# Patient Record
Sex: FEMALE | Race: BLACK OR AFRICAN AMERICAN | NOT HISPANIC OR LATINO | Employment: OTHER | URBAN - METROPOLITAN AREA
[De-identification: names, ages, dates, MRNs, and addresses within clinical notes are randomized per-mention and may not be internally consistent; named-entity substitution may affect disease eponyms.]

---

## 2017-01-05 ENCOUNTER — LAB VISIT (OUTPATIENT)
Dept: LAB | Facility: HOSPITAL | Age: 62
End: 2017-01-05
Attending: INTERNAL MEDICINE
Payer: COMMERCIAL

## 2017-01-05 ENCOUNTER — TELEPHONE (OUTPATIENT)
Dept: RHEUMATOLOGY | Facility: CLINIC | Age: 62
End: 2017-01-05

## 2017-01-05 DIAGNOSIS — M35.9 UNDIFFERENTIATED CONNECTIVE TISSUE DISEASE: ICD-10-CM

## 2017-01-05 DIAGNOSIS — M35.00 SICCA SYNDROME: ICD-10-CM

## 2017-01-05 DIAGNOSIS — M35.9 UNDIFFERENTIATED CONNECTIVE TISSUE DISEASE: Primary | ICD-10-CM

## 2017-01-05 DIAGNOSIS — R76.8 POSITIVE ANTI-CCP TEST: ICD-10-CM

## 2017-01-05 DIAGNOSIS — K51.90 ULCERATIVE COLITIS WITHOUT COMPLICATIONS: ICD-10-CM

## 2017-01-05 DIAGNOSIS — D89.2 HYPERGAMMAGLOBULINEMIA: ICD-10-CM

## 2017-01-05 DIAGNOSIS — I10 ESSENTIAL HYPERTENSION: ICD-10-CM

## 2017-01-05 PROCEDURE — 36415 COLL VENOUS BLD VENIPUNCTURE: CPT | Mod: PO

## 2017-01-05 NOTE — TELEPHONE ENCOUNTER
Dr. Del Valle 2 things:    1. Please add Venipuncture order for patient's RDL lab. Do not order Misc Send out test it is incorrect and lab will send to [facility].    2. Also add regular 4 labs. Thank you, Sorin.     Patient arriving for lab on 01/09/2017 to have RDL labs FedEX'd.

## 2017-01-05 NOTE — TELEPHONE ENCOUNTER
----- Message from Aura Faustin sent at 1/5/2017  1:32 PM CST -----  Contact: pt  Pt returning nurse call, please call pt @ [phone].

## 2017-01-06 NOTE — TELEPHONE ENCOUNTER
After reviewing this with Dr. NAM he states to place a Misc Sendout Test but in the comments to place: ELEIL 12 Panel. To be sent out by Rheumatology call Sorin when ready. EXT [id number]

## 2017-01-09 ENCOUNTER — LAB VISIT (OUTPATIENT)
Dept: LAB | Facility: HOSPITAL | Age: 62
End: 2017-01-09
Attending: INTERNAL MEDICINE
Payer: COMMERCIAL

## 2017-01-09 DIAGNOSIS — M35.9 UNDIFFERENTIATED CONNECTIVE TISSUE DISEASE: Primary | ICD-10-CM

## 2017-01-09 DIAGNOSIS — M35.9 UNDIFFERENTIATED CONNECTIVE TISSUE DISEASE: ICD-10-CM

## 2017-01-09 LAB — MISCELLANEOUS TEST NAME: NORMAL

## 2017-01-19 ENCOUNTER — OFFICE VISIT (OUTPATIENT)
Dept: RHEUMATOLOGY | Facility: CLINIC | Age: 62
End: 2017-01-19
Payer: COMMERCIAL

## 2017-01-19 VITALS
BODY MASS INDEX: 26.2 KG/M2 | HEART RATE: 79 BPM | DIASTOLIC BLOOD PRESSURE: 76 MMHG | HEIGHT: 64 IN | SYSTOLIC BLOOD PRESSURE: 122 MMHG | WEIGHT: 153.44 LBS

## 2017-01-19 DIAGNOSIS — D89.2 HYPERGAMMAGLOBULINEMIA: ICD-10-CM

## 2017-01-19 DIAGNOSIS — M35.00 SICCA SYNDROME: ICD-10-CM

## 2017-01-19 DIAGNOSIS — M35.9 UNDIFFERENTIATED CONNECTIVE TISSUE DISEASE: Primary | ICD-10-CM

## 2017-01-19 DIAGNOSIS — K51.30 ULCERATIVE RECTOSIGMOIDITIS WITHOUT COMPLICATION: ICD-10-CM

## 2017-01-19 PROCEDURE — 3078F DIAST BP <80 MM HG: CPT | Mod: S$GLB,,, | Performed by: INTERNAL MEDICINE

## 2017-01-19 PROCEDURE — 99999 PR PBB SHADOW E&M-EST. PATIENT-LVL III: CPT | Mod: PBBFAC,,, | Performed by: INTERNAL MEDICINE

## 2017-01-19 PROCEDURE — 1159F MED LIST DOCD IN RCRD: CPT | Mod: S$GLB,,, | Performed by: INTERNAL MEDICINE

## 2017-01-19 PROCEDURE — 90670 PCV13 VACCINE IM: CPT | Mod: S$GLB,,, | Performed by: INTERNAL MEDICINE

## 2017-01-19 PROCEDURE — 3074F SYST BP LT 130 MM HG: CPT | Mod: S$GLB,,, | Performed by: INTERNAL MEDICINE

## 2017-01-19 PROCEDURE — 99214 OFFICE O/P EST MOD 30 MIN: CPT | Mod: 25,S$GLB,, | Performed by: INTERNAL MEDICINE

## 2017-01-19 PROCEDURE — 90471 IMMUNIZATION ADMIN: CPT | Mod: S$GLB,,, | Performed by: INTERNAL MEDICINE

## 2017-01-19 RX ORDER — HYDROXYCHLOROQUINE SULFATE 200 MG/1
200 TABLET, FILM COATED ORAL 2 TIMES DAILY
Qty: 180 TABLET | Refills: 3 | Status: SHIPPED | OUTPATIENT
Start: 2017-01-19 | End: 2018-01-24 | Stop reason: SDUPTHER

## 2017-01-19 NOTE — PROGRESS NOTES
Administered 0.5cc Prevnar Vaccination to Right Deltoid. Pt tolerated well. No acute reaction noted to site. Pt instructed on S/S to report. Pt verbalized understanding.     Lot:[id number]  Exp:03/31/2018  Manu: [employer]

## 2017-01-19 NOTE — PROGRESS NOTES
CHIEF COMPLAINT:  UCTD.    PERTINENT HISTORY:  Juanita is followed here with a history of positive ELIEL,   with a low titer DNA on occasion, and a positive rheumatoid factor and CCP with   hypergammaglobulinemia.  She is thought to possibly have Sjogren's since she has   some dryness, but her Sjogren's antibodies have been negative.  Since she has   had a positive low DNA, we decided to send her lab out to [facility] in   [place] to see what their complete analysis would show.    She is doing well.  She has got some mild dryness but had not progressed.  She   is not having fever, chills or sweats.  She is not losing weight.  She had good   appetite.  She is having no chest pain or any type of shortness of breath.    There have been no heart symptoms.  She has had no GI issues other than stable   ulcerative colitis.  She remains on treatment for that.  Rare diarrhea noted.    Muscles and joints have been excellent.  She has had no new skin rashes.    REVIEW OF SYSTEMS:  Negative as per HPI.  GENERAL:  No fevers, chills, fatigability, change in appetite, weight loss.  No   recent infections.  SKIN:  She does use Evoxac for dryness and over-the-counter dryness   preparations.  No rashes, itching or changes in color or texture of skin, no   Raynaud's, no malar rash.  HEAD:  No headache or recent head trauma.  EYES:  Visual acuity fine.  No ocular pain or redness.  EARS:  Denies ear pain, discharge or vertigo.  NOSE:  No loss of smell.  No epistaxis or postnasal drip.  MOUTH AND THROAT:  No hoarseness or change in voice or oral ulcers.  No   difficulty swallowing or dryness.  NODES:  Denies swollen glands.  CHEST:  Denies shortness of breath, APPIAH, cyanosis, wheezing, cough and sputum   production.  CARDIOVASCULAR:  Note, she has a history of essential hypertension, is on Diovan   HCTZ.  Her blood pressure excellent today.  She also takes Norvasc.  Does not   have Raynaud's.  Denies chest pain, PND, orthopnea or  reduced exercise   tolerance.  ABDOMEN:  Ulcerative colitis seems to be well controlled presently.  She remains   on Colazal 750 mg three times daily.  No weight loss.  Denies nausea, vomiting,   diarrhea, abdominal pain, hematemesis or blood in stool.  URINARY:  No flank pain, dysuria or hematuria.  PERIPHERAL VASCULAR:  No claudication or cyanosis.  NEUROLOGIC:  No numbness, weakness or tingling.    PHYSICAL EXAMINATION:  VITAL SIGNS:  Height 5 feet 4 inches, weight 69 kg, blood pressure 122/76, pulse   79, pain 0.  APPEARANCE:  Well nourished, well developed, in no acute distress.  SKIN:  Clear.  No rashes, no wounds, no ecchymosis.  Nail beds pink.  No digital   ulcers.  No nodules or tightness noted.  HEENT:  She does have a problem with her eyes, but they are not tender and not   really dry.  She has got no parotid or submandibular enlargement.  She has   minimal dryness of her mouth.  She has got no adenopathy.  Her neck,   supraclavicular area, thyroid is nontender.  Normocephalic, atraumatic, alert   and oriented X3.  PERRLA.  Conjunctivae clear, sclerae nonicteric.  No tonsillar   enlargement.  No pharyngeal erythema or exudate.  No ulcers or lesions noted.    Mucous membranes moist and pink.  Oral pharynx clear.  Neck supple, no JVD.    Normal ROM.  Thyroid normal.  No masses or tracheal deviation.  No cervical,   axillary or inguinal lymph node enlargement.  CHEST:  Lungs clear to auscultation bilaterally.  No dullness to percussion.  No   accessory muscle use.  No intercostal retraction noted.  CARDIOVASCULAR:  Normal S1, S2.  No rubs, murmurs or gallops.  No peripheral   edema.  ABDOMEN:  Bowel sounds normal, abdomen soft, not distended.  No tenderness or   masses.  No hepatosplenomegaly.  EXTREMITIES:  No clubbing, cyanosis or edema noted.  Dorsalis and pedis pulses   2+ palpable.  NEUROLOGICAL:  Cranial nerves II-XII intact.  Motor 5/5 strength major   flexors/extensors.  No tremor.  GAIT AND  POSTURE:  Normal gait.  No cerebellar signs.  MUSCULOSKELETAL:  Muscle strength normal.  Joint:  Normal.  No swelling, redness   or heat in small joints or large joints.    LABORATORY DATA:  Labs sent out to [facility] shows her ELIEL low titer positive 1:160   smooth, with DNA antibody by FAR technique negative, [place] and the SSA and SSB   antibodies with RNP antibodies are also normal.  Scleroderma antibodies and   chromatin antibodies normal.  CCP remains high at 200.  Rheumatoid factor   remains high at 590.  Thyroid antibodies negative.  Complement levels normal.    Her last CBC was stable.  White count below normal range around 4000, stable   hematocrit.  Sed rate is always slightly high probably due to   hypergammaglobulinemia in the 30s.  Platelets are normal.  Urinalysis from last   week with no proteinuria.  No active sediment.  Note, she had a UTI back in   November treated with antibiotics.  Her last creatinine was borderline of 1.2,   clearance around 57.    IMPRESSION:  1.  UCTD with check of DNA antibodies negative through [facility] and   remaining with nonspecific low-titer ELIEL with positive CCP and rheumatoid   factor.  Note, we are watching for development of  rheumatoid   arthritis but she has no synovitis at all on her exam today.  2.  Ulcerative colitis - stable.  3.  Hypergammaglobulinemia - polyclonal by history, have not checked the IP   recently.    PLAN:  1.  Reassurance with these antibodies.  2.  Obtain followup closely.  She is to call if she sees any persistent swelling   with tenderness and morning stiffness in the small joints of her hands or feet.  3.  Maintain Plaquenil 200 b.i.d. for now.  Her eye checks, which have been   regularly done, are normal.  4.  She is going to get a Prevnar vaccination today and get Pneumovax next year.    She is up to date on flu vaccine.  See back in approximately four to six   months.          /frannie [id number] review          YOSELIN/GURVINDER  dd: 01/19/2017 14:16:45  (CST)  td: 01/19/2017 20:33:52 (CST)  Doc ID   #[id number]  Job ID #[id number]    CC:

## 2017-01-19 NOTE — MR AVS SNAPSHOT
[facility] - Rheumatology  [address]  [place] [address]  Phone: [phone]  Fax: [phone]                  Juanita Walton   2017 1:30 PM   Office Visit    Description:  Female : 1955   Provider:  Silvio Del Valle MD   Department:  [facility] - Rhe[facility]ology           Reason for Visit     UCTD           Diagnoses this Visit        Comments    Undifferentiated connective tissue disease    -  Primary     Sicca syndrome         Hypergammaglobulinemia         Ulcerative rectosigmoiditis without complication                To Do List           Future Appointments        Provider Department Dept Phone    3/6/2017 8:00 AM FIELDS, [facility] [phone]    3/6/2017 8:45 AM Johnathan Mcdaniel OD [facility] [phone]    2017 12:00 PM SPECIMEN, [facility] [phone]    2017 12:10 PM LABORATORY, [facility] [phone]    2017 3:00 PM Silvio Del Valle MD [facility] [phone]      Goals (5 Years of Data)     None      Follow-Up and Disposition     Return in about 6 months (around 2017) for me, lab as ordered 1 week pv.       These Medications        Disp Refills Start End    hydroxychloroquine (PLAQUENIL) 200 mg tablet 180 tablet 3 2017     Take 1 tablet (200 mg total) by mouth 2 (two) times daily. - Oral    Pharmacy: [id number] - [address] [address] [place] Ph #: [phone]         [facility]     [facility] On [facility] Nurse Care Line -  Assistance  Registered nurses in the [facility] Center provide clinical advisement, health education, appointment booking, and other advisory services.  Call for this free service at [phone].             Medications           Message regarding Medications     Verify the changes and/or additions to your medication regime listed below are the same as discussed with your clinician  "today.  If any of these changes or additions are incorrect, please notify your healthcare provider.        CHANGE how you are taking these medications     Start Taking Instead of    hydroxychloroquine (PLAQUENIL) 200 mg tablet hydroxychloroquine (PLAQUENIL) 200 mg tablet    Dosage:  Take 1 tablet (200 mg total) by mouth 2 (two) times daily. Dosage:  take 1 tablet by mouth twice a day    Reason for Change:  Reorder            Verify that the below list of medications is an accurate representation of the medications you are currently taking.  If none reported, the list may be blank. If incorrect, please contact your healthcare provider. Carry this list with you in case of emergency.           Current Medications     amlodipine (NORVASC) 5 MG tablet Take 5 mg by mouth once daily.    atorvastatin (LIPITOR) 10 MG tablet Take 1 tablet by mouth Daily.    balsalazide (COLAZAL) 750 mg capsule Take 750 capsules by mouth 3 (three) times daily.    cevimeline (EVOXAC) 30 mg capsule take 1 capsule by mouth three times a day    hydroxychloroquine (PLAQUENIL) 200 mg tablet Take 1 tablet (200 mg total) by mouth 2 (two) times daily.    MULTIVITAMIN ORAL Daily.    valsartan-hydrochlorothiazide (DIOVAN-HCT) 320-12.5 mg per tablet Take 320 tablets by mouth once daily.    mometasone (NASONEX) 50 mcg/actuation nasal spray 2 sprays by Nasal route daily as needed.           Clinical Reference Information           Vital Signs - Last Recorded  Most recent update: 1/19/2017  1:46 PM by Kathy Muniz LPN    BP Pulse Ht Wt BMI    122/76 79 5' 4" (1.626 m) 69.6 kg (153 lb 7 oz) 26.34 kg/m2      Blood Pressure          Most Recent Value    BP  122/76      Allergies as of 1/19/2017     No Known Allergies      Immunizations Administered on Date of Encounter - 1/19/2017     Name Date Dose VIS Date Route    Pneumococcal Conjugate - 13 Valent 1/19/2017 0.5 mL 11/5/2015 Intramuscular      Orders Placed During Today's Visit      Normal Orders This " Visit    Pneumococcal Conjugate Vaccine (13 Valent) (IM)     Future Labs/Procedures Expected by Expires    Immunofixation electrophoresis  1/19/2017 3/20/2018    Recurring Lab Work Interval Expires    C-reactive protein   1/19/2018    CBC auto differential   1/19/2018    Comprehensive metabolic panel   1/19/2018    Sedimentation rate, manual   1/19/2018    Urinalysis   3/20/2018      Instructions    No change in meds    Vit d3- 800 units day

## 2017-03-06 ENCOUNTER — OFFICE VISIT (OUTPATIENT)
Dept: OPHTHALMOLOGY | Facility: CLINIC | Age: 62
End: 2017-03-06
Payer: COMMERCIAL

## 2017-03-06 DIAGNOSIS — H43.813 POSTERIOR VITREOUS DETACHMENT, BILATERAL: ICD-10-CM

## 2017-03-06 DIAGNOSIS — H43.811 POSTERIOR VITREOUS DETACHMENT, RIGHT: ICD-10-CM

## 2017-03-06 DIAGNOSIS — Z79.899 LONG-TERM USE OF PLAQUENIL: ICD-10-CM

## 2017-03-06 DIAGNOSIS — M35.9 UNDIFFERENTIATED CONNECTIVE TISSUE DISEASE: Primary | ICD-10-CM

## 2017-03-06 DIAGNOSIS — H52.4 BILATERAL PRESBYOPIA: ICD-10-CM

## 2017-03-06 DIAGNOSIS — H52.13 MYOPIA, BILATERAL: ICD-10-CM

## 2017-03-06 PROCEDURE — 92134 CPTRZ OPH DX IMG PST SGM RTA: CPT | Mod: S$GLB,,, | Performed by: OPTOMETRIST

## 2017-03-06 PROCEDURE — 92015 DETERMINE REFRACTIVE STATE: CPT | Mod: S$GLB,,, | Performed by: OPTOMETRIST

## 2017-03-06 PROCEDURE — 92014 COMPRE OPH EXAM EST PT 1/>: CPT | Mod: S$GLB,,, | Performed by: OPTOMETRIST

## 2017-03-06 PROCEDURE — 99999 PR PBB SHADOW E&M-EST. PATIENT-LVL II: CPT | Mod: PBBFAC,,, | Performed by: OPTOMETRIST

## 2017-03-06 PROCEDURE — 92083 EXTENDED VISUAL FIELD XM: CPT | Mod: S$GLB,,, | Performed by: OPTOMETRIST

## 2017-03-06 NOTE — PROGRESS NOTES
HPI     Eye Exam    Additional comments: yearly plaq ck, hvf, and moct           Comments   Last seen 9/12/16 with jcc for pvd. Last exam 2/29/16 with trf. No visual   complaints. Uses at's prn ou.     1. Plaquenil   2. Pvd ou       Last edited by Jackie Del Valle MA on 3/6/2017  9:09 AM. (History)            Assessment /Plan     For exam results, see Encounter Report.    Undifferentiated connective tissue disease  -     Pang Visual Field - OU - Extended - Both Eyes  -     Posterior Segment OCT Retina-Both eyes    Long-term use of Plaquenil  -     Pang Visual Field - OU - Extended - Both Eyes  -     Posterior Segment OCT Retina-Both eyes    Posterior vitreous detachment, bilateral    Posterior vitreous detachment, right    Myopia, bilateral    Bilateral presbyopia      No ocular evidence of Plaquenil toxicity.    Stable PVD OU.    Dispense Final Rx for glasses.  RTC 1 year

## 2017-06-26 RX ORDER — CEVIMELINE HYDROCHLORIDE 30 MG/1
CAPSULE ORAL
Qty: 90 CAPSULE | Refills: 4 | Status: SHIPPED | OUTPATIENT
Start: 2017-06-26 | End: 2017-07-20 | Stop reason: SDUPTHER

## 2017-07-13 ENCOUNTER — LAB VISIT (OUTPATIENT)
Dept: LAB | Facility: HOSPITAL | Age: 62
End: 2017-07-13
Attending: INTERNAL MEDICINE
Payer: COMMERCIAL

## 2017-07-13 DIAGNOSIS — M35.9 UNDIFFERENTIATED CONNECTIVE TISSUE DISEASE: ICD-10-CM

## 2017-07-13 DIAGNOSIS — M35.00 SICCA SYNDROME: ICD-10-CM

## 2017-07-13 LAB
ALBUMIN SERPL BCP-MCNC: 3.9 G/DL
ALP SERPL-CCNC: 61 U/L
ALT SERPL W/O P-5'-P-CCNC: 18 U/L
ANION GAP SERPL CALC-SCNC: 5 MMOL/L
AST SERPL-CCNC: 27 U/L
BASOPHILS # BLD AUTO: 0.02 K/UL
BASOPHILS NFR BLD: 0.5 %
BILIRUB SERPL-MCNC: 0.4 MG/DL
BUN SERPL-MCNC: 11 MG/DL
CALCIUM SERPL-MCNC: 9.7 MG/DL
CHLORIDE SERPL-SCNC: 105 MMOL/L
CO2 SERPL-SCNC: 32 MMOL/L
CREAT SERPL-MCNC: 1 MG/DL
CRP SERPL-MCNC: 1.3 MG/L
DIFFERENTIAL METHOD: ABNORMAL
EOSINOPHIL # BLD AUTO: 0.1 K/UL
EOSINOPHIL NFR BLD: 2.2 %
ERYTHROCYTE [DISTWIDTH] IN BLOOD BY AUTOMATED COUNT: 12.3 %
ERYTHROCYTE [SEDIMENTATION RATE] IN BLOOD BY WESTERGREN METHOD: 25 MM/HR
EST. GFR  (AFRICAN AMERICAN): >60 ML/MIN/1.73 M^2
EST. GFR  (NON AFRICAN AMERICAN): >60 ML/MIN/1.73 M^2
GLUCOSE SERPL-MCNC: 90 MG/DL
HCT VFR BLD AUTO: 39.3 %
HGB BLD-MCNC: 13.6 G/DL
LYMPHOCYTES # BLD AUTO: 1.4 K/UL
LYMPHOCYTES NFR BLD: 38 %
MCH RBC QN AUTO: 31.3 PG
MCHC RBC AUTO-ENTMCNC: 34.6 %
MCV RBC AUTO: 91 FL
MONOCYTES # BLD AUTO: 0.3 K/UL
MONOCYTES NFR BLD: 7.3 %
NEUTROPHILS # BLD AUTO: 1.9 K/UL
NEUTROPHILS NFR BLD: 52 %
PLATELET # BLD AUTO: 207 K/UL
PMV BLD AUTO: 9.6 FL
POTASSIUM SERPL-SCNC: 4.2 MMOL/L
PROT SERPL-MCNC: 8 G/DL
RBC # BLD AUTO: 4.34 M/UL
SODIUM SERPL-SCNC: 142 MMOL/L
WBC # BLD AUTO: 3.68 K/UL

## 2017-07-13 PROCEDURE — 80053 COMPREHEN METABOLIC PANEL: CPT | Mod: PO

## 2017-07-13 PROCEDURE — 86334 IMMUNOFIX E-PHORESIS SERUM: CPT | Mod: 26,,, | Performed by: PATHOLOGY

## 2017-07-13 PROCEDURE — 85025 COMPLETE CBC W/AUTO DIFF WBC: CPT | Mod: PO

## 2017-07-13 PROCEDURE — 86334 IMMUNOFIX E-PHORESIS SERUM: CPT

## 2017-07-13 PROCEDURE — 85651 RBC SED RATE NONAUTOMATED: CPT | Mod: PO

## 2017-07-13 PROCEDURE — 86140 C-REACTIVE PROTEIN: CPT

## 2017-07-13 PROCEDURE — 36415 COLL VENOUS BLD VENIPUNCTURE: CPT | Mod: PO

## 2017-07-14 LAB
INTERPRETATION SERPL IFE-IMP: NORMAL
PATHOLOGIST INTERPRETATION IFE: NORMAL

## 2017-07-20 ENCOUNTER — OFFICE VISIT (OUTPATIENT)
Dept: RHEUMATOLOGY | Facility: CLINIC | Age: 62
End: 2017-07-20
Payer: COMMERCIAL

## 2017-07-20 VITALS
BODY MASS INDEX: 27.25 KG/M2 | WEIGHT: 159.63 LBS | HEART RATE: 64 BPM | HEIGHT: 64 IN | SYSTOLIC BLOOD PRESSURE: 131 MMHG | DIASTOLIC BLOOD PRESSURE: 79 MMHG

## 2017-07-20 DIAGNOSIS — M35.9 UNDIFFERENTIATED CONNECTIVE TISSUE DISEASE: Primary | ICD-10-CM

## 2017-07-20 DIAGNOSIS — D89.2 HYPERGAMMAGLOBULINEMIA: ICD-10-CM

## 2017-07-20 DIAGNOSIS — M35.00 SICCA SYNDROME: ICD-10-CM

## 2017-07-20 DIAGNOSIS — K51.30 ULCERATIVE RECTOSIGMOIDITIS WITHOUT COMPLICATION: ICD-10-CM

## 2017-07-20 DIAGNOSIS — R76.8 POSITIVE ANTI-CCP TEST: ICD-10-CM

## 2017-07-20 PROCEDURE — 99999 PR PBB SHADOW E&M-EST. PATIENT-LVL III: CPT | Mod: PBBFAC,,, | Performed by: INTERNAL MEDICINE

## 2017-07-20 PROCEDURE — 99214 OFFICE O/P EST MOD 30 MIN: CPT | Mod: S$GLB,,, | Performed by: INTERNAL MEDICINE

## 2017-07-20 RX ORDER — CEVIMELINE HYDROCHLORIDE 30 MG/1
30 CAPSULE ORAL 3 TIMES DAILY
Qty: 90 CAPSULE | Refills: 7 | Status: SHIPPED | OUTPATIENT
Start: 2017-07-20 | End: 2017-10-10

## 2017-07-20 NOTE — PROGRESS NOTES
CHIEF COMPLAINT:  Shoulder and hand pain -- 2/10.    PERTINENT HISTORY:  Juanita was last seen in January.  She has a history of   undifferentiated connective tissue disease.  She has persistently positive   low-titer ELIEL and occasional DNA.  She has positive rheumatoid factor and CCP.    She has hypergammaglobulinemia and dryness.  She has never developed any   persistent synovitis.  Family history is positive for rheumatoid arthritis.  She   continues to do well and she has some mild aches on a few occasions in her   fingers and shoulders, but they stay for just a few days and go and never   associated with any persistent swelling.  She is staying active.    REVIEW OF SYSTEMS:  GENERAL:  She has not lost any weight.  She maybe gained a pound or two because   of her diet.  She is not having fever, chills, or sweats.  No fatigability,   change in appetite, weight loss.  No recent infections.  SKIN:  With no major issues.  She just have some dryness.  She is using some   good creams.  No rashes, itching or changes in color or texture of skin, no   Raynaud's, no malar rash.  HEAD:  No headache or recent head trauma.  EYES:  Eyes are not too dry yet.  Visual acuity fine.  No ocular pain or   redness.  EARS:  Denies ear pain, discharge or vertigo.  NOSE:  No loss of smell, no epistaxis or post nasal drip.  MOUTH AND THROAT:  Positive for dryness.  No hoarseness or change in voice or   oral ulcers.  No difficulty swallowing or dryness.  NODES:  Denies swollen glands.  CHEST:  Denies shortness of breath, APPIAH, cyanosis, wheezing, cough and sputum   production.  CARDIOVASCULAR:  With hypertension.  She is on Norvasc and Diovan HCTZ.  Denies   chest pain, PND, orthopnea or reduced exercise tolerance.  ABDOMEN:  GI, she does have ulcerative colitis.  She is on Colazal 750 three   times daily and that is actually doing well.  No weight loss.  Denies nausea,   vomiting, diarrhea, abdominal pain, hematemesis or blood in  stool.  URINARY:  No flank pain, dysuria or hematuria.  PERIPHERAL VASCULAR:  No claudication or cyanosis.  NEUROLOGIC:  No numbness, weakness or tingling.  BLOOD:  She is hypergammaglobulinemia and this had involved anemia which she has   been stable.  Note she does use Evoxac for her dryness.    PHYSICAL EXAMINATION:  VITAL SIGNS:  With her weight 72 kg, height 5 feet 4 inches, blood pressure   131/79 with a pulse in the 60s.  Pain score 2.  APPEARANCE:  Well nourished, well developed, in no acute distress.  SKIN:  Looks normal.  No signs of scleroderma.  Skin is mildly dry.  No rashes,   no wounds, no ecchymosis.  Nail beds pink.  No digital ulcers.  No nodules or   tightness noted.  HEENT:  She does not have any adenopathy.  Normocephalic, atraumatic, alert and   oriented x3.  PERRLA.  Conjunctivae clear, sclerae nonicteric.  No tonsillar   enlargement.  No pharyngeal erythema or exudate.  No ulcers or lesions noted.    Mucous membranes moist and pink.  Oral pharynx clear.  Neck supple, no JVD.    Normal ROM.  Thyroid normal.  No masses or tracheal deviation.  No cervical,   axillary or inguinal lymph node enlargement.  CHEST:  Lungs clear to auscultation bilaterally.  No dullness to percussion.  No   accessory muscle use.  No intercostal retraction noted.  CARDIOVASCULAR:  Normal S1, S2.  No rubs, murmurs or gallops.  No peripheral   edema.  ABDOMEN:  Bowel sounds normal, abdomen soft, not distended.  No tenderness or   masses.  No hepatosplenomegaly.  EXTREMITIES:  No clubbing, cyanosis or edema noted.  Dorsalis and pedis pulses   2+ palpable.  NEUROLOGICAL:  Cranial nerves II-XII intact.  Motor 5/5 strength major   flexors/extensors.  No tremor.  GAIT AND POSTURE:  Normal gait.  No cerebellar signs.  MUSCULOSKELETAL:  Hand exam, her PIPs, MP joints and wrists have good range of   motion.  There is no redness.  There is no heat.  There is no loss of mobility.    She has normal pulses.  Her elbows, shoulders,  hips, and knees all move well.    No fluid in these areas.  Her MTPs are normal.    LABORATORY DATA:  Shows white count is a little low at 3700 and that is about   normal for her with a good differential.  Hemoglobin is very good at 13.6 and   platelets normal.  Sed rate is better at 25 and holding steady.  Chemistries   look excellent including her renal function and liver studies.  Last CRP was   1.3.  Recent lipids were good.  Antibodies as mentioned in the past high titer   rheumatoid factor and CCP with low-titer ELIEL with negative specific antibodies.    IMPRESSION:  1.  UCTD -- stable on Plaquenil 200 mg b.i.d.  Watching for Sjogren's develop or   evolution to RA that has not happened at this point.  2.  Dryness -- moderate, just watching for Sjogren's.  3.  Ulcerative colitis, doing well.  4.  Cardiovascular disease -- blood pressure is good today.    PLAN:  Maintain her Evoxac and Plaquenil.  Encouraged her to exercise and see if   she can lose a few pounds.  Call if she gets any persistent swelling,   otherwise, we will see her at six month intervals.  We encouraged her to use   Systane for her eyes if she gets any increased eye dryness.  Encouraged her to   use vitamin D3 800 units a day to protect her bones.  Make sure the eye doctor   sends a copy of his visit.          /frannie [id number] review        YOSELIN/GURVINDER  dd: 07/20/2017 16:45:37 (CDT)  td: 07/20/2017 21:37:13 (CDT)  Doc ID   #[id number]  Job ID #[id number]        roney

## 2017-07-20 NOTE — PATIENT INSTRUCTIONS
Continue plaquenil twice day    Eye checked yearly    Vit d3 - 800 units day    Exercise    vicks vapor rub to nail    Call if you have any questions [phone] ask for nurse Sorin    Call the On Call Nurse line at the end of this AVS if you cannot reach us.

## 2017-10-10 ENCOUNTER — PATIENT MESSAGE (OUTPATIENT)
Dept: RHEUMATOLOGY | Facility: CLINIC | Age: 62
End: 2017-10-10

## 2017-10-10 DIAGNOSIS — M35.00 SICCA SYNDROME: Primary | ICD-10-CM

## 2017-10-10 RX ORDER — PILOCARPINE HYDROCHLORIDE 5 MG/1
5 TABLET, FILM COATED ORAL 2 TIMES DAILY
Qty: 60 TABLET | Refills: 11 | Status: SHIPPED | OUTPATIENT
Start: 2017-10-10 | End: 2018-02-26 | Stop reason: SDUPTHER

## 2017-12-14 ENCOUNTER — OFFICE VISIT (OUTPATIENT)
Dept: FAMILY MEDICINE | Facility: CLINIC | Age: 62
End: 2017-12-14
Payer: COMMERCIAL

## 2017-12-14 VITALS
HEART RATE: 98 BPM | DIASTOLIC BLOOD PRESSURE: 64 MMHG | TEMPERATURE: 98 F | HEIGHT: 64 IN | RESPIRATION RATE: 18 BRPM | WEIGHT: 158.75 LBS | SYSTOLIC BLOOD PRESSURE: 118 MMHG | BODY MASS INDEX: 27.1 KG/M2

## 2017-12-14 DIAGNOSIS — I10 ESSENTIAL HYPERTENSION: ICD-10-CM

## 2017-12-14 DIAGNOSIS — M35.9 UNDIFFERENTIATED CONNECTIVE TISSUE DISEASE: ICD-10-CM

## 2017-12-14 DIAGNOSIS — Z00.00 PREVENTATIVE HEALTH CARE: Primary | ICD-10-CM

## 2017-12-14 DIAGNOSIS — Z23 NEED FOR TDAP VACCINATION: ICD-10-CM

## 2017-12-14 DIAGNOSIS — K51.30 ULCERATIVE RECTOSIGMOIDITIS WITHOUT COMPLICATION: ICD-10-CM

## 2017-12-14 DIAGNOSIS — E78.5 HYPERLIPIDEMIA, UNSPECIFIED HYPERLIPIDEMIA TYPE: ICD-10-CM

## 2017-12-14 PROCEDURE — 90471 IMMUNIZATION ADMIN: CPT | Mod: S$GLB,,, | Performed by: FAMILY MEDICINE

## 2017-12-14 PROCEDURE — 99999 PR PBB SHADOW E&M-EST. PATIENT-LVL III: CPT | Mod: PBBFAC,,, | Performed by: FAMILY MEDICINE

## 2017-12-14 PROCEDURE — 90715 TDAP VACCINE 7 YRS/> IM: CPT | Mod: S$GLB,,, | Performed by: FAMILY MEDICINE

## 2017-12-14 PROCEDURE — 99396 PREV VISIT EST AGE 40-64: CPT | Mod: 25,S$GLB,, | Performed by: FAMILY MEDICINE

## 2017-12-15 ENCOUNTER — LAB VISIT (OUTPATIENT)
Dept: LAB | Facility: HOSPITAL | Age: 62
End: 2017-12-15
Attending: FAMILY MEDICINE
Payer: COMMERCIAL

## 2017-12-15 DIAGNOSIS — Z00.00 PREVENTATIVE HEALTH CARE: ICD-10-CM

## 2017-12-15 LAB
CHOLEST SERPL-MCNC: 170 MG/DL
CHOLEST/HDLC SERPL: 3.5 {RATIO}
HDLC SERPL-MCNC: 49 MG/DL
HDLC SERPL: 28.8 %
LDLC SERPL CALC-MCNC: 105.4 MG/DL
NONHDLC SERPL-MCNC: 121 MG/DL
TRIGL SERPL-MCNC: 78 MG/DL
TSH SERPL DL<=0.005 MIU/L-ACNC: 1.31 UIU/ML

## 2017-12-15 PROCEDURE — 84443 ASSAY THYROID STIM HORMONE: CPT

## 2017-12-15 PROCEDURE — 36415 COLL VENOUS BLD VENIPUNCTURE: CPT | Mod: PO

## 2017-12-15 PROCEDURE — 80061 LIPID PANEL: CPT

## 2017-12-15 NOTE — PROGRESS NOTES
CHIEF COMPLAINT: This is a 62-year-old female here for preventive health exam.    SUBJECTIVE: Patient is doing well without complaints.  Patient has hypertension for which she takes amlodipine and Diovan HCT.  Blood pressure is controlled with today's reading 118/64.  Patient is followed by a cardiologist for hypertension and hyperlipidemia.  Patient has sicca syndrome and undifferentiated connective tissue disease for which she takes hydroxychloroquine.  She also has ulcerative colitis and is followed by GI.  Patient takes Colazal.     Eye exam March 2017.  Pap smear May 2017 per GYN.  Mammogram May 2017 per GYN.  Colonoscopy March 2016.  Immunizations: Influenza vaccine October 2017.  Prevnar January 2017.  Tetanus needs update.     ROS:  GENERAL: Patient denies fever, chills, night sweats.  Patient denies weight gain or loss. Patient denies anorexia, fatigue, weakness or swollen glands.  SKIN: Patient denies rash or hair loss.  HEENT: Patient denies sore throat, ear pain, hearing loss, nasal congestion, or runny nose. Patient denies visual disturbance, eye irritation or discharge.  LUNGS: Patient denies cough, wheeze or hemoptysis.  CARDIOVASCULAR: Patient denies chest pain, shortness of breath, palpitations, syncope or lower extremity edema.  GI: Patient denies abdominal pain, nausea, vomiting, diarrhea, constipation, blood in stool or melena.  GENITOURINARY: Patient denies pelvic pain, vaginal discharge, itch or odor. Patient denies irregular vaginal bleeding.  Patient denies dysuria, frequency, hematuria, nocturia, urgency or incontinence.  BREASTS: Patient denies breast pain, mass or nipple discharge.  MUSCULOSKELETAL: Patient denies joint pain, swelling, redness or warmth.  NEUROLOGIC: Patient denies headache, vertigo, paresthesias, weakness in limb, dysarthria, dysphagia or abnormality of gait.  PSYCHIATRIC: Patient denies anxiety, depression, or memory loss.     OBJECTIVE:   GENERAL: Well-developed  well-nourished slightly overweight black female alert and oriented x3, in no acute distress.  Memory, judgment and cognition without deficit.  Weight gain of 7 pounds in the last year.  SKIN: Clear without rash.  Normal color and tone.  HEENT: Eyes: Clear conjunctivae. No scleral icterus.  Pupils equal reactive to light and accommodation.  Ears: Clear canals. Clear TMs.  Nose: Without congestion.  Pharynx: Without injection or exudates.  NECK: Supple, normal range of motion.  No masses, lymphadenopathy or enlarged thyroid.  No JVD.  Carotids 2+ and equal.  No bruits.  LUNGS: Clear to auscultation.  Normal respiratory effort.  CARDIOVASCULAR:  Regular rhythm, normal S1, S2 without murmur, gallop or rub.  BACK:  No CVA or spinal tenderness.  BREASTS:  No masses, tenderness or nipple discharge.  ABDOMEN: Normal appearance.  Active bowel sounds.  Soft, nontender without mass or organomegaly.  No rebound or guarding.  EXTREMITIES: Without cyanosis, clubbing or edema.  Distal pulses 2+ and equal.  Normal range of motion in all extremities.  No joint effusion, erythema or warmth.  NEUROLOGIC:   Cranial nerves II through XII without deficit.  Motor strength equal bilaterally.  Sensation normal to touch.  Deep tendon reflexes 2+ and equal.  Gait without abnormality.  No tremor.  Negative cerebellar signs.  PELVIC: Deferred to GYN.      ASSESSMENT:  1. Preventative health care    2. Hyperlipidemia, unspecified hyperlipidemia type    3. Essential hypertension    4. Ulcerative rectosigmoiditis without complication    5. Undifferentiated connective tissue disease    6. Need for Tdap vaccination      PLAN:   1.  Weight reduction.  Exercise regularly.  2.  Age-appropriate counseling.  3.  Recent lab reviewed and acceptable.  4.  Return to clinic for fasting lipid panel and TSH.  5.  Tdap vaccine.  6.  Refill medications as needed.

## 2018-01-24 DIAGNOSIS — M35.00 SICCA SYNDROME: ICD-10-CM

## 2018-01-24 DIAGNOSIS — M35.9 UNDIFFERENTIATED CONNECTIVE TISSUE DISEASE: ICD-10-CM

## 2018-01-24 RX ORDER — HYDROXYCHLOROQUINE SULFATE 200 MG/1
200 TABLET, FILM COATED ORAL 2 TIMES DAILY
Qty: 180 TABLET | Refills: 3 | Status: SHIPPED | OUTPATIENT
Start: 2018-01-24 | End: 2018-02-26 | Stop reason: SDUPTHER

## 2018-02-14 ENCOUNTER — TELEPHONE (OUTPATIENT)
Dept: RHEUMATOLOGY | Facility: CLINIC | Age: 63
End: 2018-02-14

## 2018-02-14 ENCOUNTER — PATIENT MESSAGE (OUTPATIENT)
Dept: RHEUMATOLOGY | Facility: CLINIC | Age: 63
End: 2018-02-14

## 2018-02-14 DIAGNOSIS — M35.00 SICCA SYNDROME: ICD-10-CM

## 2018-02-14 DIAGNOSIS — D89.2 HYPERGAMMAGLOBULINEMIA: ICD-10-CM

## 2018-02-14 DIAGNOSIS — M35.9 UNDIFFERENTIATED CONNECTIVE TISSUE DISEASE: Primary | ICD-10-CM

## 2018-02-14 DIAGNOSIS — R76.8 POSITIVE ANTI-CCP TEST: ICD-10-CM

## 2018-02-20 ENCOUNTER — PATIENT MESSAGE (OUTPATIENT)
Dept: RHEUMATOLOGY | Facility: CLINIC | Age: 63
End: 2018-02-20

## 2018-02-22 LAB
ALBUMIN SERPL-MCNC: 4.3 G/DL (ref 3.6–4.8)
ALBUMIN/GLOB SERPL: 1.3 {RATIO} (ref 1.2–2.2)
ALP SERPL-CCNC: 66 IU/L (ref 39–117)
ALT SERPL-CCNC: 22 IU/L (ref 0–32)
ANA SER QL: NEGATIVE
APPEARANCE UR: CLEAR
AST SERPL-CCNC: 35 IU/L (ref 0–40)
BACTERIA #/AREA URNS HPF: NORMAL /[HPF]
BASOPHILS # BLD AUTO: 0 X10E3/UL (ref 0–0.2)
BASOPHILS NFR BLD AUTO: 0 %
BILIRUB SERPL-MCNC: 0.2 MG/DL (ref 0–1.2)
BILIRUB UR QL STRIP: NEGATIVE
BUN SERPL-MCNC: 10 MG/DL (ref 8–27)
BUN/CREAT SERPL: 10 (ref 12–28)
C3 SERPL-MCNC: 144 MG/DL (ref 82–167)
C4 SERPL-MCNC: 36 MG/DL (ref 14–44)
CALCIUM SERPL-MCNC: 9.7 MG/DL (ref 8.7–10.3)
CHLORIDE SERPL-SCNC: 103 MMOL/L (ref 96–106)
CO2 SERPL-SCNC: 28 MMOL/L (ref 18–29)
COLOR UR: YELLOW
CREAT SERPL-MCNC: 0.97 MG/DL (ref 0.57–1)
CRP SERPL-MCNC: 1.6 MG/L (ref 0–4.9)
EOSINOPHIL # BLD AUTO: 0.1 X10E3/UL (ref 0–0.4)
EOSINOPHIL NFR BLD AUTO: 4 %
EPI CELLS #/AREA URNS HPF: NORMAL /HPF
ERYTHROCYTE [DISTWIDTH] IN BLOOD BY AUTOMATED COUNT: 13 % (ref 12.3–15.4)
ERYTHROCYTE [SEDIMENTATION RATE] IN BLOOD BY WESTERGREN METHOD: 9 MM/HR (ref 0–40)
GFR SERPLBLD CREATININE-BSD FMLA CKD-EPI: 63 ML/MIN/{1.73_M2}
GFR SERPLBLD CREATININE-BSD FMLA CKD-EPI: 73 ML/MIN/{1.73_M2}
GLOBULIN SER CALC-MCNC: 3.4 G/L (ref 1.5–4.5)
GLUCOSE SERPL-MCNC: 97 MG/DL (ref 65–99)
GLUCOSE UR QL: NEGATIVE
HCT VFR BLD AUTO: 40.4 % (ref 34–46.6)
HGB BLD-MCNC: 13.7 G/DL (ref 11.1–15.9)
HGB UR QL STRIP: ABNORMAL
IMM GRANULOCYTES # BLD: 0 X10E3/UL (ref 0–0.1)
IMM GRANULOCYTES NFR BLD: 0 %
KETONES UR QL STRIP: NEGATIVE
LEUKOCYTE ESTERASE UR QL STRIP: NEGATIVE
LYMPHOCYTES # BLD AUTO: 1.6 X10E3/UL (ref 0.7–3.1)
LYMPHOCYTES NFR BLD AUTO: 41 %
MCH RBC QN AUTO: 30.6 PG (ref 26.6–33)
MCHC RBC AUTO-ENTMCNC: 33.9 G/DL (ref 31.5–35.7)
MCV RBC AUTO: 90 FL (ref 79–97)
MICRO URNS: ABNORMAL
MONOCYTES # BLD AUTO: 0.4 X10E3/UL (ref 0.1–0.9)
MONOCYTES NFR BLD AUTO: 9 %
NEUTROPHILS # BLD AUTO: 1.8 X10E3/UL (ref 1.4–7)
NEUTROPHILS NFR BLD AUTO: 46 %
NITRITE UR QL STRIP: NEGATIVE
PH UR STRIP: 6 [PH] (ref 5–7.5)
PLATELET # BLD AUTO: 260 X10E3/UL (ref 150–379)
POTASSIUM SERPL-SCNC: 4.4 MMOL/L (ref 3.5–5.2)
PROT SERPL-MCNC: 7.7 G/DL (ref 6–8.5)
PROT UR QL STRIP: NEGATIVE
RBC # BLD AUTO: 4.47 X10E6/UL (ref 3.77–5.28)
RBC #/AREA URNS HPF: NORMAL /HPF
SODIUM SERPL-SCNC: 146 MMOL/L (ref 134–144)
SP GR UR: 1.01 (ref 1–1.03)
UROBILINOGEN UR STRIP-MCNC: 0.2 MG/DL (ref 0.2–1)
WBC # BLD AUTO: 3.8 X10E3/UL (ref 3.4–10.8)
WBC #/AREA URNS HPF: NORMAL /HPF

## 2018-02-23 ENCOUNTER — TELEPHONE (OUTPATIENT)
Dept: RHEUMATOLOGY | Facility: CLINIC | Age: 63
End: 2018-02-23

## 2018-02-26 ENCOUNTER — OFFICE VISIT (OUTPATIENT)
Dept: RHEUMATOLOGY | Facility: CLINIC | Age: 63
End: 2018-02-26
Payer: COMMERCIAL

## 2018-02-26 VITALS
DIASTOLIC BLOOD PRESSURE: 72 MMHG | BODY MASS INDEX: 27.03 KG/M2 | SYSTOLIC BLOOD PRESSURE: 119 MMHG | HEIGHT: 64 IN | WEIGHT: 158.31 LBS | HEART RATE: 70 BPM

## 2018-02-26 DIAGNOSIS — M35.9 UNDIFFERENTIATED CONNECTIVE TISSUE DISEASE: Primary | ICD-10-CM

## 2018-02-26 DIAGNOSIS — M35.00 SICCA SYNDROME: ICD-10-CM

## 2018-02-26 DIAGNOSIS — I10 ESSENTIAL HYPERTENSION: ICD-10-CM

## 2018-02-26 DIAGNOSIS — K51.30 ULCERATIVE RECTOSIGMOIDITIS WITHOUT COMPLICATION: ICD-10-CM

## 2018-02-26 DIAGNOSIS — Z51.81 MEDICATION MONITORING ENCOUNTER: Chronic | ICD-10-CM

## 2018-02-26 PROCEDURE — 3008F BODY MASS INDEX DOCD: CPT | Mod: S$GLB,,, | Performed by: INTERNAL MEDICINE

## 2018-02-26 PROCEDURE — 99214 OFFICE O/P EST MOD 30 MIN: CPT | Mod: S$GLB,,, | Performed by: INTERNAL MEDICINE

## 2018-02-26 PROCEDURE — 99999 PR PBB SHADOW E&M-EST. PATIENT-LVL III: CPT | Mod: PBBFAC,,, | Performed by: INTERNAL MEDICINE

## 2018-02-26 RX ORDER — PILOCARPINE HYDROCHLORIDE 5 MG/1
5 TABLET, FILM COATED ORAL 2 TIMES DAILY
Qty: 180 TABLET | Refills: 4 | Status: SHIPPED | OUTPATIENT
Start: 2018-02-26 | End: 2019-04-09 | Stop reason: SDUPTHER

## 2018-02-26 RX ORDER — HYDROXYCHLOROQUINE SULFATE 200 MG/1
200 TABLET, FILM COATED ORAL DAILY
Qty: 90 TABLET | Refills: 3 | Status: SHIPPED | OUTPATIENT
Start: 2018-02-26 | End: 2019-07-29 | Stop reason: SDUPTHER

## 2018-02-26 NOTE — PROGRESS NOTES
RHEUMATOLOGY FOLLOWUP     CHIEF COMPLAINT:  Sjogren's syndrome with UCTD.    PERTINENT HISTORY:  Juanita was last seen in the summer.  At that time, she was   continued on Plaquenil 200 mg twice daily and Salagen 1-2 daily for her UCTD   with Sjogren's and significant dryness.  She did have a positive ELIEL in the past   with negative Dna antibodies.  She has associated ulcerative colitis for which   she is on Colazal and thus doing fairly well, unless she stays on her diet and   takes her medicines.  She is not experiencing any prolonged weight loss.  She is   not experiencing fevers or having new rashes.  There have been no swollen   parotids.  Her mouth and eyes do stay dry. She uses pilocarpine probably once   daily most of the time and Systane eye drops - stable.  Not having increasing   lymph nodes swelling.  Cardiopulmonary symptoms have been fine.  She does have   hypertension, on meds.  She has no other new illness.  She is retired now   probably not doing quite as many exercises.  She did have a low flare in her   right groin with some pain a few weeks back.  It resolved now with Tylenol.      SOCIAL HISTORY:  She is not smoking.    ALLERGIES:  None known.    REVIEW OF SYSTEMS:  GENERAL:  As mentioned were normal.  HEENT:  With mild dryness, but no hair loss or malar rashes.  CHEST:  Denies shortness of breath.  APPIAH, cyanosis, wheezing, cough and sputum   production.  CARDIOVASCULAR:  Denies chest pain, PND, orthopnea or reduced exercise   tolerance.  She does have hypertension for which she is on medicine and that is   doing well today.  Blood pressure normal.    LUNGS:  Negative.  GI:  GI tract, ulcerative colitis, stable on Colazal.  Hyperlipidemia, on   Lipitor.  MUSCLES:  Good.  SKIN:  A bit clear.  VESSELS:  Normal.  No Raynaud's.     PHYSICAL EXAMINATION:  VITAL SIGNS:  Weight 71 kg, blood pressure 119/72, pulse in the 70s, pain score   0.  HEENT:  Normocephalic, atraumatic, alert and oriented X3.   PERRLA.  Conjunctiva   clear, sclera nonicteric.  No tonsillar enlargement.  No pharyngeal erythema or   exudate.  No ulcers or lesions noted.  Mucous membranes moist and pink.  Oral   pharynx clear.  Neck supple, no JVD.  Normal ROM.  Thyroid normal.  No masses or   tracheal deviation.  No cervical, axillary or inguinal lymph node enlargement.    She does not have any unusual rashes or hair loss.  She has no severe dryness.    She does not have any swollen glands in her neck.  No enlarged parotid glands.  Submand., etc    JOINT:  She has good strength with joint exam.  Does not show any marked heat,   redness, or swelling.  Good range of motion.  Right groin is normal today and   painful.  She has no peripheral edema.    LABORATORY DATA:  Reviewed with her and looks fantastic.  Her ELIEL is now   negative.  Complements normal.  Her white count remains on the low side of   normal 3.8, with normal hemoglobin and platelets.  Sed rate improved from 25 to   9.  Electrolytes good.  Renal function, LFTs normal, CRP 16.  Recent lipids were   very good.  Urinalysis was clear.     IMPRESSION:   1.  Undifferentiated connective tissue disease, leaning towards Sjogren's by   history - doing well symptomatically and ELIEL has become negative now.  No signs   of worsening end organ disease.  2.  Ulcerative colitis - stable presently.  No signs of reactive arthritis.   3.  Hypertension, stable.  4.  Recent right groin pain - resolved.  Not clear what set that off.     PLAN:  1.   Encouraged her to get back to exercising more.  2.  We will decrease Plaquenil to 1 daily.  3.  We remain Salagen one to two daily for dryness symptoms.  4.  Maintain her eyedrops.  5.  See back in 6 months with routine lab.  No change in her antihypertensives   or colitis meds needed at this time.            /frannie [id number] petra(s)        YOSELIN/HARINI  dd: 02/26/2018 15:34:11 (CST)  td: 02/27/2018 13:43:50 (CST)  Doc ID   #[id number]  Job ID #[id number]    CC:

## 2018-03-12 ENCOUNTER — OFFICE VISIT (OUTPATIENT)
Dept: OPHTHALMOLOGY | Facility: CLINIC | Age: 63
End: 2018-03-12
Payer: COMMERCIAL

## 2018-03-12 DIAGNOSIS — H52.4 BILATERAL PRESBYOPIA: ICD-10-CM

## 2018-03-12 DIAGNOSIS — Z79.899 LONG-TERM USE OF PLAQUENIL: ICD-10-CM

## 2018-03-12 DIAGNOSIS — H52.13 MYOPIA, BILATERAL: ICD-10-CM

## 2018-03-12 DIAGNOSIS — M35.9 UNDIFFERENTIATED CONNECTIVE TISSUE DISEASE: Primary | ICD-10-CM

## 2018-03-12 PROCEDURE — 92082 INTERMEDIATE VISUAL FIELD XM: CPT | Mod: S$GLB,,, | Performed by: OPTOMETRIST

## 2018-03-12 PROCEDURE — 99999 PR PBB SHADOW E&M-EST. PATIENT-LVL I: CPT | Mod: PBBFAC,,, | Performed by: OPTOMETRIST

## 2018-03-12 PROCEDURE — 92015 DETERMINE REFRACTIVE STATE: CPT | Mod: S$GLB,,, | Performed by: OPTOMETRIST

## 2018-03-12 PROCEDURE — 92014 COMPRE OPH EXAM EST PT 1/>: CPT | Mod: S$GLB,,, | Performed by: OPTOMETRIST

## 2018-03-12 PROCEDURE — 92134 CPTRZ OPH DX IMG PST SGM RTA: CPT | Mod: S$GLB,,, | Performed by: OPTOMETRIST

## 2018-03-12 NOTE — PROGRESS NOTES
HPI     No visual complaints. Review 10-2 and MOCT. Last eye exam 03/06/2017 TRF.      Last edited by Hanna Alvarado on 3/12/2018  8:52 AM. (History)            Assessment /Plan     For exam results, see Encounter Report.    Undifferentiated connective tissue disease    Long-term use of Plaquenil    Myopia, bilateral    Bilateral presbyopia      No active anterior or posterior uveitis OU    Dispense Final Rx for glasses.  RTC 1 year VF and mOCT

## 2018-05-11 ENCOUNTER — PATIENT OUTREACH (OUTPATIENT)
Dept: ADMINISTRATIVE | Facility: HOSPITAL | Age: 63
End: 2018-05-11

## 2018-05-25 ENCOUNTER — OFFICE VISIT (OUTPATIENT)
Dept: FAMILY MEDICINE | Facility: CLINIC | Age: 63
End: 2018-05-25
Payer: COMMERCIAL

## 2018-05-25 VITALS
DIASTOLIC BLOOD PRESSURE: 62 MMHG | TEMPERATURE: 98 F | HEIGHT: 64 IN | RESPIRATION RATE: 18 BRPM | WEIGHT: 158.31 LBS | BODY MASS INDEX: 27.03 KG/M2 | OXYGEN SATURATION: 97 % | SYSTOLIC BLOOD PRESSURE: 118 MMHG | HEART RATE: 87 BPM

## 2018-05-25 DIAGNOSIS — Z12.4 CERVICAL CANCER SCREENING: ICD-10-CM

## 2018-05-25 DIAGNOSIS — Z12.31 ENCOUNTER FOR SCREENING MAMMOGRAM FOR MALIGNANT NEOPLASM OF BREAST: ICD-10-CM

## 2018-05-25 DIAGNOSIS — Z01.419 WELL FEMALE EXAM WITH ROUTINE GYNECOLOGICAL EXAM: Primary | ICD-10-CM

## 2018-05-25 PROCEDURE — 3078F DIAST BP <80 MM HG: CPT | Mod: CPTII,S$GLB,, | Performed by: FAMILY MEDICINE

## 2018-05-25 PROCEDURE — 88175 CYTOPATH C/V AUTO FLUID REDO: CPT

## 2018-05-25 PROCEDURE — 3074F SYST BP LT 130 MM HG: CPT | Mod: CPTII,S$GLB,, | Performed by: FAMILY MEDICINE

## 2018-05-25 PROCEDURE — 99999 PR PBB SHADOW E&M-EST. PATIENT-LVL III: CPT | Mod: PBBFAC,,, | Performed by: FAMILY MEDICINE

## 2018-05-25 PROCEDURE — 99213 OFFICE O/P EST LOW 20 MIN: CPT | Mod: S$GLB,,, | Performed by: FAMILY MEDICINE

## 2018-05-25 RX ORDER — POLYETHYLENE GLYCOL 3350, SODIUM CHLORIDE, SODIUM BICARBONATE, POTASSIUM CHLORIDE 420; 11.2; 5.72; 1.48 G/4L; G/4L; G/4L; G/4L
POWDER, FOR SOLUTION ORAL
Refills: 0 | COMMUNITY
Start: 2018-05-16 | End: 2019-10-17 | Stop reason: ALTCHOICE

## 2018-05-25 NOTE — PROGRESS NOTES
CHIEF COMPLAINT: This is a 63-year-old female here for well female exam with routine gynecological exam.    SUBJECTIVE: Patient is doing well without complaints.  She has a history of HPV infection with treatment years ago.  She's been getting annual Pap smears by GYN.  Patient has hypertension for which she takes amlodipine and Diovan HCT.  Blood pressure is controlled with today's reading 118/64.  Patient is followed by a cardiologist for hypertension and hyperlipidemia.  Patient has sicca syndrome and undifferentiated connective tissue disease for which she takes hydroxychloroquine.  She also has ulcerative colitis and is followed by GI.  Patient takes Colazal.     Eye exam March 2017.  Pap smear May 2017 per GYN.  Mammogram May 2017 per GYN.  Colonoscopy March 2016.  Immunizations: Influenza vaccine October 2017.  Prevnar January 2017.  Tdap December 2017.     ROS:  GENERAL: Patient denies fever, chills, night sweats.  Patient denies weight gain or loss. Patient denies anorexia, fatigue, weakness or swollen glands.  SKIN: Patient denies rash or hair loss.  HEENT: Patient denies sore throat, ear pain, hearing loss, nasal congestion, or runny nose. Patient denies visual disturbance, eye irritation or discharge.  LUNGS: Patient denies cough, wheeze or hemoptysis.  CARDIOVASCULAR: Patient denies chest pain, shortness of breath, palpitations, syncope or lower extremity edema.  GI: Patient denies abdominal pain, nausea, vomiting, diarrhea, constipation, blood in stool or melena.  GENITOURINARY: Patient denies pelvic pain, vaginal discharge, itch or odor. Patient denies irregular vaginal bleeding.  Patient denies dysuria, frequency, hematuria, nocturia, urgency or incontinence.  BREASTS: Patient denies breast pain, mass or nipple discharge.  MUSCULOSKELETAL: Patient denies joint pain, swelling, redness or warmth.  NEUROLOGIC: Patient denies headache, vertigo, paresthesias, weakness in limb, dysarthria, dysphagia or  abnormality of gait.  PSYCHIATRIC: Patient denies anxiety, depression, or memory loss.     OBJECTIVE:   GENERAL: Well-developed well-nourished slightly overweight black female alert and oriented x3, in no acute distress.  Memory, judgment and cognition without deficit.    SKIN: Clear without rash.  Normal color and tone.  HEENT: Eyes: Clear conjunctivae. No scleral icterus.  Pupils equal reactive to light and accommodation.  Ears: Clear canals. Clear TMs.  Nose: Without congestion.  Pharynx: Without injection or exudates.  NECK: Supple, normal range of motion.  No masses, lymphadenopathy or enlarged thyroid.  No JVD.  Carotids 2+ and equal.  No bruits.  LUNGS: Clear to auscultation.  Normal respiratory effort.  CARDIOVASCULAR:  Regular rhythm, normal S1, S2 without murmur, gallop or rub.  BACK:  No CVA or spinal tenderness.  BREASTS:  No masses, tenderness or nipple discharge.  ABDOMEN: Normal appearance.  Active bowel sounds.  Soft, nontender without mass or organomegaly.  No rebound or guarding.  EXTREMITIES: Without cyanosis, clubbing or edema.  Distal pulses 2+ and equal.  Normal range of motion in all extremities.  No joint effusion, erythema or warmth.  NEUROLOGIC:   Cranial nerves II through XII without deficit.  Motor strength equal bilaterally.  Sensation normal to touch.  Deep tendon reflexes 2+ and equal.  Gait without abnormality.  No tremor.  Negative cerebellar signs.  PELVIC:  External: Without lesions or inflammation.  Vaginal: Clear, atrophic changes.  Cervix: Normal appearance, no motion tenderness.  Pap X2.  Uterus: Normal size and shape.  Adnexa: Non-tender, without masses.  Rectovaginal: Confirms, heme-negative stool x2.    ASSESSMENT:  1. Well female exam with routine gynecological exam    2. Encounter for screening mammogram for malignant neoplasm of breast    3. Cervical cancer screening      PLAN:   1.  Weight reduction.  Exercise regularly.  2.  Age-appropriate counseling.  3.   Mammogram.  4.  Follow-up annually.

## 2018-06-15 ENCOUNTER — HOSPITAL ENCOUNTER (OUTPATIENT)
Dept: RADIOLOGY | Facility: HOSPITAL | Age: 63
Discharge: HOME | End: 2018-06-15
Attending: FAMILY MEDICINE
Payer: COMMERCIAL

## 2018-06-15 VITALS — HEIGHT: 64 IN | WEIGHT: 158 LBS | BODY MASS INDEX: 26.98 KG/M2

## 2018-06-15 DIAGNOSIS — Z12.31 ENCOUNTER FOR SCREENING MAMMOGRAM FOR MALIGNANT NEOPLASM OF BREAST: ICD-10-CM

## 2018-06-15 PROCEDURE — 77063 BREAST TOMOSYNTHESIS BI: CPT | Mod: 26,,, | Performed by: RADIOLOGY

## 2018-06-15 PROCEDURE — 77067 SCR MAMMO BI INCL CAD: CPT | Mod: TC,PO

## 2018-06-15 PROCEDURE — 77067 SCR MAMMO BI INCL CAD: CPT | Mod: 26,,, | Performed by: RADIOLOGY

## 2018-07-19 ENCOUNTER — PATIENT OUTREACH (OUTPATIENT)
Dept: ADMINISTRATIVE | Facility: HOSPITAL | Age: 63
End: 2018-07-19

## 2018-07-20 PROBLEM — K63.5 COLON POLYP: Status: ACTIVE | Noted: 2018-07-20

## 2018-08-28 ENCOUNTER — OFFICE VISIT (OUTPATIENT)
Dept: RHEUMATOLOGY | Facility: CLINIC | Age: 63
End: 2018-08-28
Payer: COMMERCIAL

## 2018-08-28 VITALS
BODY MASS INDEX: 27.06 KG/M2 | WEIGHT: 158.5 LBS | DIASTOLIC BLOOD PRESSURE: 81 MMHG | HEIGHT: 64 IN | SYSTOLIC BLOOD PRESSURE: 145 MMHG | HEART RATE: 102 BPM

## 2018-08-28 DIAGNOSIS — R76.8 POSITIVE ANTI-CCP TEST: ICD-10-CM

## 2018-08-28 DIAGNOSIS — M35.9 UNDIFFERENTIATED CONNECTIVE TISSUE DISEASE: Primary | ICD-10-CM

## 2018-08-28 DIAGNOSIS — K51.30 ULCERATIVE RECTOSIGMOIDITIS WITHOUT COMPLICATION: ICD-10-CM

## 2018-08-28 DIAGNOSIS — D89.2 HYPERGAMMAGLOBULINEMIA: ICD-10-CM

## 2018-08-28 DIAGNOSIS — M35.00 SICCA SYNDROME: ICD-10-CM

## 2018-08-28 PROCEDURE — 3079F DIAST BP 80-89 MM HG: CPT | Mod: CPTII,S$GLB,, | Performed by: PHYSICIAN ASSISTANT

## 2018-08-28 PROCEDURE — 99999 PR PBB SHADOW E&M-EST. PATIENT-LVL III: CPT | Mod: PBBFAC,,, | Performed by: PHYSICIAN ASSISTANT

## 2018-08-28 PROCEDURE — 3008F BODY MASS INDEX DOCD: CPT | Mod: CPTII,S$GLB,, | Performed by: PHYSICIAN ASSISTANT

## 2018-08-28 PROCEDURE — 3077F SYST BP >= 140 MM HG: CPT | Mod: CPTII,S$GLB,, | Performed by: PHYSICIAN ASSISTANT

## 2018-08-28 PROCEDURE — 99214 OFFICE O/P EST MOD 30 MIN: CPT | Mod: S$GLB,,, | Performed by: PHYSICIAN ASSISTANT

## 2018-08-28 NOTE — PROGRESS NOTES
Subjective:       Patient ID: Juanita Walton is a 63 y.o. female.    Chief Complaint: Undefiferentiated Connective Tissue Disease    Juanita back today for rheumatology followup.      She has chronic undifferentiated connective tissue disease with a positive ELIEL, negative autoantibody profile, positive rheumatoid factor (500) , positive CCP(94).  Family history rheumatoid arthritis.  She has + Mild dry mouth and dry eyes stable on salagen orally bid  Over-the-counter biotene products for mouth and otc rewetting drops for her eyes.        she has not developed any signs of inflammatory arthritis,no joint pain or joint swelling, no morning stiffness. She denies mouth ulcers, no Raynaud's, no rashes, no chest pain, no pleuritic chest pain, no fevers, no weight loss, no parotitis   Still on plaquenil 200 mg once daily (2.77 mg/kg X 14 yrs.) regular eye checks with no plaquenil toxicity issues. No changes since last visit.     She dose have Ulcerative colitis which has been stable on colozal. She reports her pain level today as 0/10.  Using flaxseed oil 4000 mg daily,  Daily MV, and glucosamine/chondrontin.  We did tarun hand and foot X-ray 2016  left 1st mtp hallux valgus and cmc OA both hands. No erosions or signs of RA.       Review of Systems   Constitutional: Negative.  Negative for activity change, appetite change, chills, fatigue and fever.   HENT: Negative.  Negative for mouth sores and trouble swallowing.         + dry mouth   Eyes: Negative.  Negative for photophobia, pain and redness.        No swollen or red eyes, + dry eye     Respiratory: Negative.  Negative for chest tightness, shortness of breath, wheezing and stridor.    Cardiovascular: Negative.  Negative for chest pain.   Gastrointestinal: Negative.  Negative for abdominal pain, blood in stool, diarrhea, nausea and vomiting.   Genitourinary: Negative.  Negative for dysuria, frequency, hematuria and urgency.   Musculoskeletal: Negative.  Negative for  "arthralgias, back pain, gait problem, joint swelling, myalgias, neck pain and neck stiffness.   Skin: Negative.  Negative for color change, pallor and rash.   Neurological: Negative.  Negative for weakness.   Hematological: Negative for adenopathy.   Psychiatric/Behavioral: Negative for suicidal ideas.         Objective:   BP (!) 145/81   Pulse 102   Ht 5' 4" (1.626 m)   Wt 71.9 kg (158 lb 8.2 oz)   BMI 27.21 kg/m²      Physical Exam   Constitutional: She is oriented to person, place, and time and well-developed, well-nourished, and in no distress. No distress.   HENT:   Head: Normocephalic and atraumatic.   Right Ear: External ear normal.   Left Ear: External ear normal.   Mouth/Throat: No oropharyngeal exudate.   Eyes: Conjunctivae and EOM are normal. Pupils are equal, round, and reactive to light. No scleral icterus.   Neck: Normal range of motion. Neck supple. No thyromegaly present.   Cardiovascular: Normal rate, regular rhythm and normal heart sounds.    No murmur heard.  Pulmonary/Chest: Effort normal and breath sounds normal. She exhibits no tenderness.   Abdominal: Soft. Bowel sounds are normal.   Lymphadenopathy:     She has no cervical adenopathy.   Neurological: She is alert and oriented to person, place, and time. She displays normal reflexes. No cranial nerve deficit. She exhibits normal muscle tone. Gait normal.   Skin: Skin is warm and dry. No rash noted.     Musculoskeletal: Normal range of motion. She exhibits no edema or tenderness.                  No results found for this or any previous visit (from the past 504 hour(s)).   Labs done [facility] 8/30/18    Cbc looks good normal wbc, plt, h&h  Neg armaan  Normal complement   Normal spep, no monoclonal spikes  IGA elevated 391, IGg and IGM wnl  Liver test good  Creatinine 1.0 e GFR 68        Results for REGGIE VALDES (MRN [id number]) as of 8/28/2018 09:16   Ref. Range 2/21/2018 08:25   ARMAAN Latest Ref Range: Negative  Negative   C3 Complement " Latest Ref Range: 82 - 167 mg/dL 144   C4 Complement Latest Ref Range: 14 - 44 mg/dL 36     Results for REGGIE VALDES (MRN [id number]) as of 8/28/2018 09:45   Ref. Range 1/9/2017 11:53   IgG - Serum Latest Ref Range: 650 - 1600 mg/dL 1734 (H)   IgM Latest Ref Range: 50 - 300 mg/dL 124   IgA Latest Ref Range: 40 - 350 mg/dL 404 (H)        Component      Latest Ref Rng & Units 12/28/2015 2/27/2004   CCP Antibodies      <5.0 U/mL 93.6 (H) >100.0         Assessment:       1. Undifferentiated connective tissue disease    2. Sicca syndrome    3. Hypergammaglobulinemia    4. 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 Undiff CTD with  + ARMAAN, neg profile, + RF, + CCP with mouth and eye dryness but no signs of RA, myositis, lupus, myositis currently this most likely is Sjogren's, watching for possible overlap with RA     Stable on plaquenil Q day  2.77 mg/kg X 14 yrs  No retinal toxicity, yearly eye checks     2. Ulcerative Colitis on Colozal per GI- stable     3. Elevated esr with prior elevated IgA and IGG, no monoclonal spikes on immunofixation, slightly elevated gamma on spep - repeat normal - will follow     Plan:         Send for labs today, orders printed for [facility]  Cbc, cmp, esr, crp, UA, armaan, c3, c4, vit D, immunoglobulin, immunofixation and spep  Result review thru pt portal     C/w plaquenil once daily,yearly eye checks    C/w salagen bid, can go up to tid if needed  C/w otc products for dryness  C/w gluc/chondrontin, flaxseed and daily MV    Gi for her UC, stable on colozal     Follow her immunoglobulin, spep and immunofixation yearly      rtc 6 mon with labs done at [facility]    Please call or send portal message with any questions or concerns

## 2018-09-05 LAB
25(OH)D3+25(OH)D2 SERPL-MCNC: 36.3 NG/ML (ref 30–100)
ALBUMIN SERPL ELPH-MCNC: 3.8 G/DL (ref 2.9–4.4)
ALBUMIN SERPL-MCNC: 4.5 G/DL (ref 3.6–4.8)
ALBUMIN/GLOB SERPL: 1 {RATIO} (ref 0.7–1.7)
ALBUMIN/GLOB SERPL: 1.5 {RATIO} (ref 1.2–2.2)
ALP SERPL-CCNC: 69 IU/L (ref 39–117)
ALPHA1 GLOB SERPL ELPH-MCNC: 0.2 G/DL (ref 0–0.4)
ALPHA2 GLOB SERPL ELPH-MCNC: 0.7 G/DL (ref 0.4–1)
ALT SERPL-CCNC: 16 IU/L (ref 0–32)
ANA SER QL: NEGATIVE
APPEARANCE UR: CLEAR
AST SERPL-CCNC: 27 IU/L (ref 0–40)
B-GLOBULIN SERPL ELPH-MCNC: 1.1 G/DL (ref 0.7–1.3)
BACTERIA #/AREA URNS HPF: NORMAL /[HPF]
BASOPHILS # BLD AUTO: 0 X10E3/UL (ref 0–0.2)
BASOPHILS NFR BLD AUTO: 0 %
BILIRUB SERPL-MCNC: <0.2 MG/DL (ref 0–1.2)
BILIRUB UR QL STRIP: NEGATIVE
BUN SERPL-MCNC: 15 MG/DL (ref 8–27)
BUN/CREAT SERPL: 15 (ref 12–28)
C3 SERPL-MCNC: 156 MG/DL (ref 82–167)
C4 SERPL-MCNC: 44 MG/DL (ref 14–44)
CALCIUM SERPL-MCNC: 9.5 MG/DL (ref 8.7–10.3)
CHLORIDE SERPL-SCNC: 101 MMOL/L (ref 96–106)
CO2 SERPL-SCNC: 25 MMOL/L (ref 20–29)
COLOR UR: YELLOW
CREAT SERPL-MCNC: 1.02 MG/DL (ref 0.57–1)
CRP SERPL-MCNC: 1.6 MG/L (ref 0–4.9)
EGFR IF AFRICAN AMERICAN: 68 ML/MIN/1.73
EOSINOPHIL # BLD AUTO: 0.1 X10E3/UL (ref 0–0.4)
EOSINOPHIL NFR BLD AUTO: 2 %
EPI CELLS #/AREA URNS HPF: NORMAL /HPF
ERYTHROCYTE [DISTWIDTH] IN BLOOD BY AUTOMATED COUNT: 13.5 % (ref 12.3–15.4)
EST. GFR  (NON AFRICAN AMERICAN): 59 ML/MIN/1.73
GAMMA GLOB SERPL ELPH-MCNC: 1.8 G/DL (ref 0.4–1.8)
GLOBULIN SER CALC-MCNC: 3.1 G/DL (ref 1.5–4.5)
GLOBULIN SER CALC-MCNC: 3.8 G/DL (ref 2.2–3.9)
GLUCOSE SERPL-MCNC: 93 MG/DL (ref 65–99)
GLUCOSE UR QL: NEGATIVE
HCT VFR BLD AUTO: 41 % (ref 34–46.6)
HGB BLD-MCNC: 14 G/DL (ref 11.1–15.9)
HGB UR QL STRIP: ABNORMAL
IGA SERPL-MCNC: 391 MG/DL (ref 87–352)
IGE SERPL-ACNC: 51 IU/ML (ref 0–100)
IGG SERPL-MCNC: 1562 MG/DL (ref 700–1600)
IGM SERPL-MCNC: 92 MG/DL (ref 26–217)
IMM GRANULOCYTES # BLD: 0 X10E3/UL (ref 0–0.1)
IMM GRANULOCYTES NFR BLD: 0 %
KETONES UR QL STRIP: NEGATIVE
LEUKOCYTE ESTERASE UR QL STRIP: ABNORMAL
LYMPHOCYTES # BLD AUTO: 1.7 X10E3/UL (ref 0.7–3.1)
LYMPHOCYTES NFR BLD AUTO: 39 %
Lab: NORMAL
M PROTEIN SERPL ELPH-MCNC: NORMAL G/DL
MCH RBC QN AUTO: 30.9 PG (ref 26.6–33)
MCHC RBC AUTO-ENTMCNC: 34.1 G/DL (ref 31.5–35.7)
MCV RBC AUTO: 91 FL (ref 79–97)
MICRO URNS: ABNORMAL
MONOCYTES # BLD AUTO: 0.4 X10E3/UL (ref 0.1–0.9)
MONOCYTES NFR BLD AUTO: 9 %
NEUTROPHILS # BLD AUTO: 2.2 X10E3/UL (ref 1.4–7)
NEUTROPHILS NFR BLD AUTO: 50 %
NITRITE UR QL STRIP: NEGATIVE
PH UR STRIP: 6 [PH] (ref 5–7.5)
PLATELET # BLD AUTO: 223 X10E3/UL (ref 150–379)
POTASSIUM SERPL-SCNC: 4.1 MMOL/L (ref 3.5–5.2)
PROT PATTERN SERPL IFE-IMP: ABNORMAL
PROT SERPL-MCNC: 7.6 G/DL (ref 6–8.5)
PROT UR QL STRIP: NEGATIVE
RBC # BLD AUTO: 4.53 X10E6/UL (ref 3.77–5.28)
RBC #/AREA URNS HPF: NORMAL /HPF
SODIUM SERPL-SCNC: 143 MMOL/L (ref 134–144)
SP GR UR: 1.02 (ref 1–1.03)
UROBILINOGEN UR STRIP-MCNC: 0.2 MG/DL (ref 0.2–1)
WBC # BLD AUTO: 4.4 X10E3/UL (ref 3.4–10.8)
WBC #/AREA URNS HPF: NORMAL /HPF

## 2018-12-18 ENCOUNTER — TELEPHONE (OUTPATIENT)
Dept: RHEUMATOLOGY | Facility: CLINIC | Age: 63
End: 2018-12-18

## 2019-02-07 ENCOUNTER — PATIENT MESSAGE (OUTPATIENT)
Dept: RHEUMATOLOGY | Facility: CLINIC | Age: 64
End: 2019-02-07

## 2019-02-07 DIAGNOSIS — M35.9 UNDIFFERENTIATED CONNECTIVE TISSUE DISEASE: Primary | ICD-10-CM

## 2019-02-07 DIAGNOSIS — D89.2 HYPERGAMMAGLOBULINEMIA: ICD-10-CM

## 2019-02-11 ENCOUNTER — TELEPHONE (OUTPATIENT)
Dept: RHEUMATOLOGY | Facility: CLINIC | Age: 64
End: 2019-02-11

## 2019-02-19 LAB
25(OH)D3+25(OH)D2 SERPL-MCNC: 29.8 NG/ML (ref 30–100)
ALBUMIN SERPL ELPH-MCNC: 3.6 G/DL (ref 2.9–4.4)
ALBUMIN SERPL-MCNC: 4.4 G/DL (ref 3.6–4.8)
ALBUMIN/GLOB SERPL: 0.8 {RATIO} (ref 0.7–1.7)
ALBUMIN/GLOB SERPL: 1.3 {RATIO} (ref 1.2–2.2)
ALP SERPL-CCNC: 85 IU/L (ref 39–117)
ALPHA1 GLOB SERPL ELPH-MCNC: 0.3 G/DL (ref 0–0.4)
ALPHA2 GLOB SERPL ELPH-MCNC: 1 G/DL (ref 0.4–1)
ALT SERPL-CCNC: 22 IU/L (ref 0–32)
ANA SER QL: NEGATIVE
APPEARANCE UR: CLEAR
AST SERPL-CCNC: 27 IU/L (ref 0–40)
B-GLOBULIN SERPL ELPH-MCNC: 1.3 G/DL (ref 0.7–1.3)
BASOPHILS # BLD AUTO: 0 X10E3/UL (ref 0–0.2)
BASOPHILS NFR BLD AUTO: 0 %
BILIRUB SERPL-MCNC: <0.2 MG/DL (ref 0–1.2)
BILIRUB UR QL STRIP: NEGATIVE
BUN SERPL-MCNC: 15 MG/DL (ref 8–27)
BUN/CREAT SERPL: 17 (ref 12–28)
C3 SERPL-MCNC: 160 MG/DL (ref 82–167)
C4 SERPL-MCNC: 46 MG/DL (ref 14–44)
CALCIUM SERPL-MCNC: 9.6 MG/DL (ref 8.7–10.3)
CHLORIDE SERPL-SCNC: 102 MMOL/L (ref 96–106)
CO2 SERPL-SCNC: 25 MMOL/L (ref 20–29)
COLOR UR: YELLOW
CREAT SERPL-MCNC: 0.9 MG/DL (ref 0.57–1)
CRP SERPL-MCNC: 3.7 MG/L (ref 0–4.9)
EOSINOPHIL # BLD AUTO: 0.1 X10E3/UL (ref 0–0.4)
EOSINOPHIL NFR BLD AUTO: 2 %
ERYTHROCYTE [DISTWIDTH] IN BLOOD BY AUTOMATED COUNT: 13 % (ref 12.3–15.4)
ERYTHROCYTE [SEDIMENTATION RATE] IN BLOOD BY WESTERGREN METHOD: 23 MM/HR (ref 0–40)
GAMMA GLOB SERPL ELPH-MCNC: 1.7 G/DL (ref 0.4–1.8)
GLOBULIN SER CALC-MCNC: 3.5 G/DL (ref 1.5–4.5)
GLOBULIN SER CALC-MCNC: 4.3 G/DL (ref 2.2–3.9)
GLUCOSE SERPL-MCNC: 84 MG/DL (ref 65–99)
GLUCOSE UR QL: NEGATIVE
HCT VFR BLD AUTO: 39 % (ref 34–46.6)
HGB BLD-MCNC: 13 G/DL (ref 11.1–15.9)
HGB UR QL STRIP: NEGATIVE
IGA SERPL-MCNC: 442 MG/DL (ref 87–352)
IGG SERPL-MCNC: 1873 MG/DL (ref 700–1600)
IGM SERPL-MCNC: 89 MG/DL (ref 26–217)
IMM GRANULOCYTES # BLD AUTO: 0 X10E3/UL (ref 0–0.1)
IMM GRANULOCYTES NFR BLD AUTO: 0 %
KETONES UR QL STRIP: NEGATIVE
LABORATORY COMMENT REPORT: ABNORMAL
LEUKOCYTE ESTERASE UR QL STRIP: NEGATIVE
LYMPHOCYTES # BLD AUTO: 1.5 X10E3/UL (ref 0.7–3.1)
LYMPHOCYTES NFR BLD AUTO: 26 %
M PROTEIN SERPL ELPH-MCNC: ABNORMAL G/DL
MCH RBC QN AUTO: 30.7 PG (ref 26.6–33)
MCHC RBC AUTO-ENTMCNC: 33.3 G/DL (ref 31.5–35.7)
MCV RBC AUTO: 92 FL (ref 79–97)
MICRO URNS: NORMAL
MONOCYTES # BLD AUTO: 0.4 X10E3/UL (ref 0.1–0.9)
MONOCYTES NFR BLD AUTO: 6 %
NEUTROPHILS # BLD AUTO: 3.7 X10E3/UL (ref 1.4–7)
NEUTROPHILS NFR BLD AUTO: 66 %
NITRITE UR QL STRIP: NEGATIVE
PH UR STRIP: 6.5 [PH] (ref 5–7.5)
PLATELET # BLD AUTO: 352 X10E3/UL (ref 150–379)
POTASSIUM SERPL-SCNC: 4.2 MMOL/L (ref 3.5–5.2)
PROT PATTERN SERPL IFE-IMP: ABNORMAL
PROT SERPL-MCNC: 7.9 G/DL (ref 6–8.5)
PROT UR QL STRIP: NEGATIVE
RBC # BLD AUTO: 4.23 X10E6/UL (ref 3.77–5.28)
SODIUM SERPL-SCNC: 143 MMOL/L (ref 134–144)
SP GR UR: 1.01 (ref 1–1.03)
UROBILINOGEN UR STRIP-MCNC: 0.2 MG/DL (ref 0.2–1)
WBC # BLD AUTO: 5.7 X10E3/UL (ref 3.4–10.8)

## 2019-02-26 ENCOUNTER — OFFICE VISIT (OUTPATIENT)
Dept: RHEUMATOLOGY | Facility: CLINIC | Age: 64
End: 2019-02-26
Payer: COMMERCIAL

## 2019-02-26 VITALS
DIASTOLIC BLOOD PRESSURE: 82 MMHG | SYSTOLIC BLOOD PRESSURE: 134 MMHG | WEIGHT: 165.13 LBS | HEIGHT: 64 IN | HEART RATE: 90 BPM | BODY MASS INDEX: 28.19 KG/M2

## 2019-02-26 DIAGNOSIS — M35.9 UNDIFFERENTIATED CONNECTIVE TISSUE DISEASE: Primary | ICD-10-CM

## 2019-02-26 DIAGNOSIS — M79.641 PAIN IN BOTH HANDS: ICD-10-CM

## 2019-02-26 DIAGNOSIS — D89.2 HYPERGAMMAGLOBULINEMIA: ICD-10-CM

## 2019-02-26 DIAGNOSIS — M35.00 SICCA SYNDROME: ICD-10-CM

## 2019-02-26 DIAGNOSIS — R76.8 POSITIVE ANTI-CCP TEST: ICD-10-CM

## 2019-02-26 DIAGNOSIS — M79.642 PAIN IN BOTH HANDS: ICD-10-CM

## 2019-02-26 PROCEDURE — 3079F PR MOST RECENT DIASTOLIC BLOOD PRESSURE 80-89 MM HG: ICD-10-PCS | Mod: CPTII,S$GLB,, | Performed by: PHYSICIAN ASSISTANT

## 2019-02-26 PROCEDURE — 3008F PR BODY MASS INDEX (BMI) DOCUMENTED: ICD-10-PCS | Mod: CPTII,S$GLB,, | Performed by: PHYSICIAN ASSISTANT

## 2019-02-26 PROCEDURE — 3008F BODY MASS INDEX DOCD: CPT | Mod: CPTII,S$GLB,, | Performed by: PHYSICIAN ASSISTANT

## 2019-02-26 PROCEDURE — 3079F DIAST BP 80-89 MM HG: CPT | Mod: CPTII,S$GLB,, | Performed by: PHYSICIAN ASSISTANT

## 2019-02-26 PROCEDURE — 99999 PR PBB SHADOW E&M-EST. PATIENT-LVL IV: CPT | Mod: PBBFAC,,, | Performed by: PHYSICIAN ASSISTANT

## 2019-02-26 PROCEDURE — 3075F PR MOST RECENT SYSTOLIC BLOOD PRESS GE 130-139MM HG: ICD-10-PCS | Mod: CPTII,S$GLB,, | Performed by: PHYSICIAN ASSISTANT

## 2019-02-26 PROCEDURE — 99214 OFFICE O/P EST MOD 30 MIN: CPT | Mod: S$GLB,,, | Performed by: PHYSICIAN ASSISTANT

## 2019-02-26 PROCEDURE — 99999 PR PBB SHADOW E&M-EST. PATIENT-LVL IV: ICD-10-PCS | Mod: PBBFAC,,, | Performed by: PHYSICIAN ASSISTANT

## 2019-02-26 PROCEDURE — 3075F SYST BP GE 130 - 139MM HG: CPT | Mod: CPTII,S$GLB,, | Performed by: PHYSICIAN ASSISTANT

## 2019-02-26 PROCEDURE — 99214 PR OFFICE/OUTPT VISIT, EST, LEVL IV, 30-39 MIN: ICD-10-PCS | Mod: S$GLB,,, | Performed by: PHYSICIAN ASSISTANT

## 2019-02-26 RX ORDER — OLMESARTAN MEDOXOMIL AND HYDROCHLOROTHIAZIDE 40/12.5 40; 12.5 MG/1; MG/1
1 TABLET ORAL DAILY
COMMUNITY
End: 2019-06-03

## 2019-02-26 NOTE — PROGRESS NOTES
Subjective:       Patient ID: Juanita Walton is a 63 y.o. female.    Chief Complaint: Undifferentiated Connective Tissue Disease    Juanita back today for rheumatology followup.      She has chronic undifferentiated connective tissue disease with a positive ELIEL, negative autoantibody profile, positive rheumatoid factor (500) , positive CCP(94).  Family history rheumatoid arthritis.  She has + Mild dry mouth and dry eyes stable on salagen orally 1-2 times a day.  Over-the-counter biotene products for mouth and otc rewetting drops for her eyes.      Left 5th dip tenderness- last x-ray showed mild djd, recently increased pain with intermittent swelling, no associated warmth, redness, no other msk issues. Pain today 8/10 left 5th dip   We did tarun hand and foot X-ray 2016  left 1st mtp hallux valgus and cmc OA both hands. No erosions or signs of RA. on flaxseed oil 4000 mg daily,  Daily MV, and glucosamine/chondrontin.      she has not developed any signs of inflammatory arthritis,no joint pain or joint swelling, no morning stiffness. She denies mouth ulcers, no Raynaud's, no rashes, no chest pain, no pleuritic chest pain, no fevers, no weight loss, no parotitis   Still on plaquenil 200 mg once daily (2.77 mg/kg X 14 yrs.) regular eye checks with no plaquenil toxicity issues. No changes since last visit.     She dose have Ulcerative colitis which has been stable on colozal.        Review of Systems   Constitutional: Negative.  Negative for activity change, appetite change, chills, fatigue and fever.   HENT: Negative.  Negative for mouth sores and trouble swallowing.         + dry mouth   Eyes: Negative.  Negative for photophobia, pain and redness.        No swollen or red eyes, + dry eye     Respiratory: Negative.  Negative for chest tightness, shortness of breath, wheezing and stridor.    Cardiovascular: Negative.  Negative for chest pain.   Gastrointestinal: Negative.  Negative for abdominal pain, blood in stool,  "diarrhea, nausea and vomiting.   Genitourinary: Negative.  Negative for dysuria, frequency, hematuria and urgency.   Musculoskeletal: Positive for arthralgias. Negative for back pain, gait problem, joint swelling, myalgias, neck pain and neck stiffness.   Skin: Negative.  Negative for color change, pallor and rash.   Neurological: Negative.  Negative for weakness.   Hematological: Negative for adenopathy.   Psychiatric/Behavioral: Negative for suicidal ideas.         Objective:   /82   Pulse 90   Ht 5' 4" (1.626 m)   Wt 74.9 kg (165 lb 2 oz)   BMI 28.34 kg/m²      Physical Exam   Constitutional: She is oriented to person, place, and time and well-developed, well-nourished, and in no distress. No distress.   HENT:   Head: Normocephalic and atraumatic.   Right Ear: External ear normal.   Left Ear: External ear normal.   Mouth/Throat: No oropharyngeal exudate.   Eyes: Conjunctivae and EOM are normal. Pupils are equal, round, and reactive to light. No scleral icterus.   Neck: Normal range of motion. Neck supple. No thyromegaly present.   Cardiovascular: Normal rate, regular rhythm and normal heart sounds.    No murmur heard.  Pulmonary/Chest: Effort normal and breath sounds normal. She exhibits no tenderness.   Abdominal: Soft. Bowel sounds are normal.   Lymphadenopathy:     She has no cervical adenopathy.   Neurological: She is alert and oriented to person, place, and time. She displays normal reflexes. No cranial nerve deficit. She exhibits normal muscle tone. Gait normal.   Skin: Skin is warm and dry. No rash noted.     Musculoskeletal: Normal range of motion. She exhibits edema and tenderness.   Mild ttp left 5th dip with mild early  heberden node vs nodule, no synovitis, warmth or erythema noted                  2/15/19 Labs at [facility]  CMP and cbc wnl  Vit D 30  IGG 1873 (^),  (^), IGM 89  C4 high 46, C3 normal 160  ELIEL neg  CRA enl 3.7  ua normal   spep and immunofixation are still pending "       Labs done [facility] 8/30/18  Cbc looks good normal wbc, plt, h&h  Neg armaan  Normal complement   Normal spep, no monoclonal spikes  IGA elevated 391, IGg and IGM wnl  Liver test good  Creatinine 1.0 e GFR 68          Results for REGGIE VALDES (MRN [id number]) as of 8/28/2018 09:16   Ref. Range 2/21/2018 08:25   ARMAAN Latest Ref Range: Negative  Negative   C3 Complement Latest Ref Range: 82 - 167 mg/dL 144   C4 Complement Latest Ref Range: 14 - 44 mg/dL 36     Results for REGGIE VALDES (MRN [id number]) as of 8/28/2018 09:45   Ref. Range 1/9/2017 11:53   IgG - Serum Latest Ref Range: 650 - 1600 mg/dL 1734 (H)   IgM Latest Ref Range: 50 - 300 mg/dL 124   IgA Latest Ref Range: 40 - 350 mg/dL 404 (H)        Component      Latest Ref Rng & Units 12/28/2015 2/27/2004   CCP Antibodies      <5.0 U/mL 93.6 (H) >100.0         Assessment:       1. Undifferentiated connective tissue disease    2. Sicca syndrome    3. Hypergammaglobulinemia    4. Positive anti-CCP test    5. Pain in both hands          1. Undiff CTD with  + ARMAAN, neg profile, + RF, + CCP with mouth and eye dryness but no signs of RA, myositis, lupus, myositis currently this most likely is Sjogren's, watching for possible overlap with RA     Stable on plaquenil Q day  2.77 mg/kg X 14 yrs  No retinal toxicity, yearly eye checks     2. Ulcerative Colitis on Colozal per GI- stable     3. Hypergammaglobulinemia   Elevated esr with prior elevated IgA and IGG, no monoclonal spikes on immunofixation, slightly elevated gamma on spep - repeat normal - will follow - spep and immunofixation pending  Repeat IGG 1873, - has trended upward over time     Plan:           C/w plaquenil once daily,yearly eye checks    C/w salagen 1-2 daily, can go up to tid if needed  C/w otc products for dryness  C/w gluc/chondrontin, flaxseed and daily MV    Gi for her UC, stable on colozal     Follow her immunoglobulin, spep and immunofixation yearly    X-ray tarun hands - gianni with + ccp-  nodule/bone spur left 5th dip joint    rtc 6 mon with labs done at [facility]    Please call or send portal message with any questions or concerns

## 2019-02-27 ENCOUNTER — HOSPITAL ENCOUNTER (OUTPATIENT)
Dept: RADIOLOGY | Facility: HOSPITAL | Age: 64
Discharge: HOME | End: 2019-02-27
Attending: PHYSICIAN ASSISTANT
Payer: COMMERCIAL

## 2019-02-27 ENCOUNTER — PATIENT MESSAGE (OUTPATIENT)
Dept: RHEUMATOLOGY | Facility: CLINIC | Age: 64
End: 2019-02-27

## 2019-02-27 DIAGNOSIS — M79.642 PAIN IN BOTH HANDS: ICD-10-CM

## 2019-02-27 DIAGNOSIS — M79.641 PAIN IN BOTH HANDS: ICD-10-CM

## 2019-02-27 DIAGNOSIS — R76.8 POSITIVE ANTI-CCP TEST: ICD-10-CM

## 2019-02-27 PROCEDURE — 73130 X-RAY EXAM OF HAND: CPT | Mod: 26,RT,, | Performed by: RADIOLOGY

## 2019-02-27 PROCEDURE — 73130 X-RAY EXAM OF HAND: CPT | Mod: 50,TC

## 2019-02-27 PROCEDURE — 73130 X-RAY EXAM OF HAND: CPT | Mod: 26,59,LT, | Performed by: RADIOLOGY

## 2019-02-27 PROCEDURE — 73130 PR  X-RAY HAND 3+ VW: ICD-10-PCS | Mod: 26,59,LT, | Performed by: RADIOLOGY

## 2019-03-08 ENCOUNTER — TELEPHONE (OUTPATIENT)
Dept: RHEUMATOLOGY | Facility: CLINIC | Age: 64
End: 2019-03-08

## 2019-03-08 NOTE — TELEPHONE ENCOUNTER
Called pt to report labs results look good  X-ray shows no damage or calcium  Just mild OA damage

## 2019-04-09 DIAGNOSIS — M35.00 SICCA SYNDROME: ICD-10-CM

## 2019-04-09 DIAGNOSIS — M35.9 UNDIFFERENTIATED CONNECTIVE TISSUE DISEASE: ICD-10-CM

## 2019-04-09 RX ORDER — PILOCARPINE HYDROCHLORIDE 5 MG/1
TABLET, FILM COATED ORAL
Qty: 180 TABLET | Refills: 2 | Status: SHIPPED | OUTPATIENT
Start: 2019-04-09 | End: 2020-05-07

## 2019-05-20 ENCOUNTER — PATIENT OUTREACH (OUTPATIENT)
Dept: ADMINISTRATIVE | Facility: HOSPITAL | Age: 64
End: 2019-05-20

## 2019-06-03 ENCOUNTER — OFFICE VISIT (OUTPATIENT)
Dept: FAMILY MEDICINE | Facility: CLINIC | Age: 64
End: 2019-06-03
Payer: COMMERCIAL

## 2019-06-03 VITALS
HEIGHT: 64 IN | OXYGEN SATURATION: 98 % | DIASTOLIC BLOOD PRESSURE: 66 MMHG | SYSTOLIC BLOOD PRESSURE: 114 MMHG | HEART RATE: 81 BPM | TEMPERATURE: 98 F | BODY MASS INDEX: 28.38 KG/M2 | RESPIRATION RATE: 18 BRPM | WEIGHT: 166.25 LBS

## 2019-06-03 DIAGNOSIS — Z00.00 PREVENTATIVE HEALTH CARE: Primary | ICD-10-CM

## 2019-06-03 DIAGNOSIS — I10 ESSENTIAL HYPERTENSION: Chronic | ICD-10-CM

## 2019-06-03 DIAGNOSIS — Z12.31 ENCOUNTER FOR SCREENING MAMMOGRAM FOR MALIGNANT NEOPLASM OF BREAST: ICD-10-CM

## 2019-06-03 DIAGNOSIS — Z12.4 CERVICAL CANCER SCREENING: ICD-10-CM

## 2019-06-03 DIAGNOSIS — E78.5 HYPERLIPIDEMIA, UNSPECIFIED HYPERLIPIDEMIA TYPE: Chronic | ICD-10-CM

## 2019-06-03 PROBLEM — K63.5 COLON POLYP: Status: RESOLVED | Noted: 2018-07-20 | Resolved: 2019-06-03

## 2019-06-03 PROCEDURE — 99396 PREV VISIT EST AGE 40-64: CPT | Mod: S$GLB,,, | Performed by: FAMILY MEDICINE

## 2019-06-03 PROCEDURE — 99999 PR PBB SHADOW E&M-EST. PATIENT-LVL IV: CPT | Mod: PBBFAC,,, | Performed by: FAMILY MEDICINE

## 2019-06-03 PROCEDURE — 99396 PR PREVENTIVE VISIT,EST,40-64: ICD-10-PCS | Mod: S$GLB,,, | Performed by: FAMILY MEDICINE

## 2019-06-03 PROCEDURE — 3078F PR MOST RECENT DIASTOLIC BLOOD PRESSURE < 80 MM HG: ICD-10-PCS | Mod: CPTII,S$GLB,, | Performed by: FAMILY MEDICINE

## 2019-06-03 PROCEDURE — 87624 HPV HI-RISK TYP POOLED RSLT: CPT

## 2019-06-03 PROCEDURE — 3074F SYST BP LT 130 MM HG: CPT | Mod: CPTII,S$GLB,, | Performed by: FAMILY MEDICINE

## 2019-06-03 PROCEDURE — 88175 CYTOPATH C/V AUTO FLUID REDO: CPT

## 2019-06-03 PROCEDURE — 3074F PR MOST RECENT SYSTOLIC BLOOD PRESSURE < 130 MM HG: ICD-10-PCS | Mod: CPTII,S$GLB,, | Performed by: FAMILY MEDICINE

## 2019-06-03 PROCEDURE — 99999 PR PBB SHADOW E&M-EST. PATIENT-LVL IV: ICD-10-PCS | Mod: PBBFAC,,, | Performed by: FAMILY MEDICINE

## 2019-06-03 PROCEDURE — 3078F DIAST BP <80 MM HG: CPT | Mod: CPTII,S$GLB,, | Performed by: FAMILY MEDICINE

## 2019-06-03 RX ORDER — OLMESARTAN MEDOXOMIL, AMLODIPINE AND HYDROCHLOROTHIAZIDE TABLET 40/5/12.5 MG 40; 5; 12.5 MG/1; MG/1; MG/1
TABLET ORAL
COMMUNITY
Start: 2018-09-11 | End: 2020-07-23

## 2019-06-03 NOTE — PROGRESS NOTES
CHIEF COMPLAINT:  This is a 64-year-old female here for preventive health exam.    SUBJECTIVE: Patient is doing well without complaints except for pain left lateral knee when she sleeps on left side.  Patient has hypertension for which she takes a combination of amlodipine, olmesartan and HCTZ.  Blood pressure is controlled with today's reading 114/66.  She takes atorvastatin 10 mg daily for hyperlipidemia.  Patient is followed by a cardiologist.  Patient has sicca syndrome and undifferentiated connective tissue disease for which she takes hydroxychloroquine.  She has ulcerative colitis and is followed by GI.  She takes Colazal with control of her symptoms.     Eye exam March 2018.  Pap smear May 2018.  Mammogram June 2018.  Colonoscopy March 2016.  Immunizations: Influenza vaccine November 2018.  Prevnar January 2017.  Tdap December 2017.       ROS:  GENERAL: Patient denies fever, chills, night sweats.  Patient denies weight gain or loss. Patient denies anorexia, fatigue, weakness or swollen glands.  SKIN: Patient denies rash or hair loss.  HEENT: Patient denies sore throat, ear pain, hearing loss, nasal congestion, or runny nose. Patient denies visual disturbance, eye irritation or discharge.  LUNGS: Patient denies cough, wheeze or hemoptysis.  CARDIOVASCULAR: Patient denies chest pain, shortness of breath, palpitations, syncope or lower extremity edema.  GI: Patient denies abdominal pain, nausea, vomiting, diarrhea, constipation, blood in stool or melena.  GENITOURINARY: Patient denies pelvic pain, vaginal discharge, itch or odor. Patient denies irregular vaginal bleeding.  Patient denies dysuria, frequency, hematuria, nocturia, urgency or incontinence.  BREASTS: Patient denies breast pain, mass or nipple discharge.  MUSCULOSKELETAL: Patient denies joint pain, swelling, redness or warmth.  NEUROLOGIC: Patient denies headache, vertigo, paresthesias, weakness in limb, dysarthria, dysphagia or abnormality of  gait.  PSYCHIATRIC: Patient denies anxiety, depression, or memory loss.     OBJECTIVE:   GENERAL: Well-developed well-nourished slightly overweight black female alert and oriented x3, in no acute distress.  Memory, judgment and cognition without deficit.  Weight gain of 8 pounds in the last year.  SKIN: Clear without rash.  Normal color and tone.  HEENT: Eyes: Clear conjunctivae. No scleral icterus.  Pupils equal reactive to light and accommodation.  Ears: Clear canals. Clear TMs.  Nose: Without congestion.  Pharynx: Without injection or exudates.  NECK: Supple, normal range of motion.  No masses, lymphadenopathy or enlarged thyroid.  No JVD.  Carotids 2+ and equal.  No bruits.  LUNGS: Clear to auscultation.  Normal respiratory effort.  CARDIOVASCULAR:  Regular rhythm, normal S1, S2 without murmur, gallop or rub.  BACK:  No CVA or spinal tenderness.  BREASTS:  No masses, tenderness or nipple discharge.  ABDOMEN: Normal appearance.  Active bowel sounds.  Soft, nontender without mass or organomegaly.  No rebound or guarding.  EXTREMITIES: Without cyanosis, clubbing or edema.  Distal pulses 2+ and equal.  Normal range of motion in all extremities.  No joint effusion, erythema or warmth.  Bilateral bunions.  NEUROLOGIC:   Cranial nerves II through XII without deficit.  Motor strength equal bilaterally.  Sensation normal to touch.  Deep tendon reflexes 2+ and equal.  Gait without abnormality.  No tremor.  Negative cerebellar signs.  PELVIC:  External: Without lesions or inflammation.  Vaginal: Clear, atrophic changes.  Cervix: Normal appearance, no motion tenderness.  Pap X2.  Uterus: Normal size and shape.  Adnexa: Non-tender, without masses.  Rectovaginal: Confirms, heme-negative stool x2.    ASSESSMENT:  1. Preventative health care    2. Essential hypertension    3. Hyperlipidemia, unspecified hyperlipidemia type    4. Encounter for screening mammogram for malignant neoplasm of breast    5. Cervical cancer screening       PLAN:   1.  Weight reduction.  Exercise regularly.  2.  Age-appropriate counseling.  3.  Recent lab reviewed and acceptable.  4.  Mammogram.  5.  Refill medications as needed.  6.  Follow-up annually.    This note is generated with speech recognition software and is subject to transcription error and sound alike phrases that may be missed by proofreading.

## 2019-06-07 LAB
HPV HR 12 DNA CVX QL NAA+PROBE: POSITIVE
HPV16 AG SPEC QL: POSITIVE
HPV18 DNA SPEC QL NAA+PROBE: NEGATIVE

## 2019-06-10 ENCOUNTER — PATIENT MESSAGE (OUTPATIENT)
Dept: FAMILY MEDICINE | Facility: CLINIC | Age: 64
End: 2019-06-10

## 2019-06-10 DIAGNOSIS — R87.810 CERVICAL HIGH RISK HPV (HUMAN PAPILLOMAVIRUS) TEST POSITIVE: Primary | ICD-10-CM

## 2019-06-17 ENCOUNTER — HOSPITAL ENCOUNTER (OUTPATIENT)
Dept: RADIOLOGY | Facility: HOSPITAL | Age: 64
Discharge: HOME | End: 2019-06-17
Attending: FAMILY MEDICINE
Payer: COMMERCIAL

## 2019-06-17 VITALS — WEIGHT: 166 LBS | BODY MASS INDEX: 28.34 KG/M2 | HEIGHT: 64 IN

## 2019-06-17 DIAGNOSIS — Z12.31 ENCOUNTER FOR SCREENING MAMMOGRAM FOR MALIGNANT NEOPLASM OF BREAST: ICD-10-CM

## 2019-06-17 PROCEDURE — 77063 MAMMO DIGITAL SCREENING BILAT WITH TOMOSYNTHESIS_CAD: ICD-10-PCS | Mod: 26,,, | Performed by: RADIOLOGY

## 2019-06-17 PROCEDURE — 77067 SCR MAMMO BI INCL CAD: CPT | Mod: 26,,, | Performed by: RADIOLOGY

## 2019-06-17 PROCEDURE — 77067 MAMMO DIGITAL SCREENING BILAT WITH TOMOSYNTHESIS_CAD: ICD-10-PCS | Mod: 26,,, | Performed by: RADIOLOGY

## 2019-06-17 PROCEDURE — 77067 SCR MAMMO BI INCL CAD: CPT | Mod: TC

## 2019-06-17 PROCEDURE — 77063 BREAST TOMOSYNTHESIS BI: CPT | Mod: 26,,, | Performed by: RADIOLOGY

## 2019-06-21 ENCOUNTER — OFFICE VISIT (OUTPATIENT)
Dept: OBSTETRICS AND GYNECOLOGY | Facility: CLINIC | Age: 64
End: 2019-06-21
Payer: COMMERCIAL

## 2019-06-21 VITALS
DIASTOLIC BLOOD PRESSURE: 68 MMHG | WEIGHT: 166.44 LBS | BODY MASS INDEX: 28.42 KG/M2 | HEIGHT: 64 IN | SYSTOLIC BLOOD PRESSURE: 128 MMHG

## 2019-06-21 DIAGNOSIS — R87.810 CERVICAL HIGH RISK HPV (HUMAN PAPILLOMAVIRUS) TEST POSITIVE: Primary | ICD-10-CM

## 2019-06-21 PROCEDURE — 99203 OFFICE O/P NEW LOW 30 MIN: CPT | Mod: S$GLB,,, | Performed by: OBSTETRICS & GYNECOLOGY

## 2019-06-21 PROCEDURE — 3078F DIAST BP <80 MM HG: CPT | Mod: CPTII,S$GLB,, | Performed by: OBSTETRICS & GYNECOLOGY

## 2019-06-21 PROCEDURE — 3008F PR BODY MASS INDEX (BMI) DOCUMENTED: ICD-10-PCS | Mod: CPTII,S$GLB,, | Performed by: OBSTETRICS & GYNECOLOGY

## 2019-06-21 PROCEDURE — 99203 PR OFFICE/OUTPT VISIT, NEW, LEVL III, 30-44 MIN: ICD-10-PCS | Mod: S$GLB,,, | Performed by: OBSTETRICS & GYNECOLOGY

## 2019-06-21 PROCEDURE — 99999 PR PBB SHADOW E&M-EST. PATIENT-LVL III: CPT | Mod: PBBFAC,,, | Performed by: OBSTETRICS & GYNECOLOGY

## 2019-06-21 PROCEDURE — 3008F BODY MASS INDEX DOCD: CPT | Mod: CPTII,S$GLB,, | Performed by: OBSTETRICS & GYNECOLOGY

## 2019-06-21 PROCEDURE — 3078F PR MOST RECENT DIASTOLIC BLOOD PRESSURE < 80 MM HG: ICD-10-PCS | Mod: CPTII,S$GLB,, | Performed by: OBSTETRICS & GYNECOLOGY

## 2019-06-21 PROCEDURE — 3074F PR MOST RECENT SYSTOLIC BLOOD PRESSURE < 130 MM HG: ICD-10-PCS | Mod: CPTII,S$GLB,, | Performed by: OBSTETRICS & GYNECOLOGY

## 2019-06-21 PROCEDURE — 3074F SYST BP LT 130 MM HG: CPT | Mod: CPTII,S$GLB,, | Performed by: OBSTETRICS & GYNECOLOGY

## 2019-06-21 PROCEDURE — 99999 PR PBB SHADOW E&M-EST. PATIENT-LVL III: ICD-10-PCS | Mod: PBBFAC,,, | Performed by: OBSTETRICS & GYNECOLOGY

## 2019-06-21 NOTE — PROGRESS NOTES
Subjective:       Patient ID: Juanita Walton is a 64 y.o. female.    Chief Complaint:  Consult      History of Present Illness  HPI  Pt was referred by her PCP for evaluation of HPV positive result on recent pap smear.  Pt reports that her pap results have been normal for many years but does recall having HPV on her paps over 10 years ago when she was seeing Dr. Sam.  Pt recalls having a procedure performed on her cervix but does not recall specifics about the procedure.  Last pap NILM with + HPV 16 and other HR types.  Pap in 2018 was NILM (no co-test).     GYN & OB History  No LMP recorded. Patient is postmenopausal.   Date of Last Pap: 6/10/2019    OB History    Para Term  AB Living   2 2 0         SAB TAB Ectopic Multiple Live Births                  # Outcome Date GA Lbr Manuel/2nd Weight Sex Delivery Anes PTL Lv   2 Para      Vag-Spont      1 Para      Vag-Spont          Review of Systems  Review of Systems   Constitutional: Negative for activity change, appetite change, chills, fatigue, fever and unexpected weight change.   Respiratory: Negative for shortness of breath.    Cardiovascular: Negative for chest pain, palpitations and leg swelling.   Gastrointestinal: Negative for abdominal pain, bloating, blood in stool, constipation, diarrhea, nausea and vomiting.   Genitourinary: Negative for dysuria, flank pain, frequency, genital sores, hematuria, hot flashes, pelvic pain, urgency, vaginal bleeding, vaginal discharge, vaginal pain, urinary incontinence, postmenopausal bleeding, vaginal dryness and vaginal odor.   Musculoskeletal: Negative for back pain.   Integumentary:  Negative for breast mass, nipple discharge, breast skin changes and breast tenderness.   Neurological: Negative for syncope and headaches.   Breast: Negative for asymmetry, lump, mass, mastodynia, nipple discharge, skin changes and tenderness          Objective:    Physical Exam:   Constitutional: She is oriented to person,  place, and time. She appears well-developed and well-nourished. No distress.                           Neurological: She is alert and oriented to person, place, and time.     Psychiatric: She has a normal mood and affect. Her behavior is normal. Thought content normal.          Assessment:        1. Cervical high risk HPV (human papillomavirus) test positive             Plan:      Cervical high risk HPV (human papillomavirus) test positive  -     Pt was counseled on pap and HPV results.  Recommend proceeding with colposcopy.  Pt voiced understanding and questions were answered.      Follow up for Colposcopy.

## 2019-06-21 NOTE — PATIENT INSTRUCTIONS
Colposcopy  Colposcopy is a procedure that gives your health care provider a magnified view of the cervix. It is done using a lighted microscope called a colposcope. In most cases, a sample of cervical cells is taken during a biopsy. The sample can then be studied in a lab. If any problems are found, you and your health care provider will discuss treatment options. It usually takes less than 30 minutes, and you can often go back to your normal routine right away.  Reasons for the procedure  Colposcopy is usually done as a follow-up exam to help find the cause of an abnormal Pap test. Results of an abnormal Pap test can mean that the cells do not appear normal or that there are cancerous cells. Abnormal cells can also be caused by infections. HPV (human papillomavirus) is a large family of viruses that can be passed from person to person through sex. HPV can cause genital warts. It can also cause changes in cervical cells. If an HPV test is positive and the Pap test is abnormal, a colposcopy may be recommended. Colposcopy is also used to evaluate other problems. These include pain or bleeding during sexual intercourse, or a lesion on the vulva or vagina.  What are the risks?  Problems after colposcopy are very rare, but can include:  · Bleeding (if a biopsy is done)  · Infection  Getting ready for the procedure  Colposcopy is normally done in your health care providers office. It will be scheduled for a time when youre not having your menstrual period. You may be asked to sign a form giving your consent to have the procedure. A day or 2 before the procedure, your health care provider may also ask you to:  · Avoid sexual intercourse.  · Stop using tampons.  · Avoid using creams or other vaginal medicines.  · Avoid douching.  · Take over-the-counter pain medicines an hour or 2 before the procedure.  During colposcopy  · You will be asked to lie on an exam table with your knees bent, just as you do for a Pap  test.  · An instrument called a speculum is inserted into the vagina to hold it open.  · A vinegar solution is applied to the cervix to make the abnormal cells easier to see. You may feel pressure or a slight burning for a few moments. In some cases, the cervix may be numbed first with an anesthetic.  · The cervix is viewed through the colposcope, which is placed outside the vagina.  · If your health care provider sees abnormal areas on the cervix, a biopsy will be done. The tissue sample is sent to a lab for study.  · An endocervical curettage may also be done at the time of colposcopy. In this procedure, an instrument is put into the endocervical canal to get a sample of cells from the endocervix. This area can't be seen with a colposcope.   · You may feel slight pinching or cramping during the biopsy. Medicine may be applied to the biopsy site to stop bleeding.  After the procedure  · If you feel lightheaded or dizzy, you can rest on the table until youre ready to get dressed.  · If a biopsy was done, you may have mild cramping or light bleeding for a few days. You may also have discharge from the medicine used to stop bleeding at the biopsy site.  · Use pads, not tampons, for at least the first 24 hours.  · If you have any discomfort, over-the-counter pain medicine can provide relief.  · Ask your health care provider when you can resume sexual intercourse.  Follow-up  If a biopsy was done, your health care provider will get the lab report in a week or 2. You and your health care provider can then discuss the results. In some cases, you may be scheduled for further tests or treatment. Be sure to keep follow-up appointments with your health care provider.     Call your health care provider if you have:  · Heavy vaginal bleeding (more than a pad an hour for 2 hours).  · Severe or increasing pelvic pain.  · A fever over 100.4°F (38°C)  · Foul-smelling or unusual vaginal discharge.   Date Last Reviewed: 5/10/2015  ©  8209-6863 [employer]. [address] [place] [address], [place] [address]. All rights reserved. This information is not intended as a substitute for professional medical care. Always follow your healthcare professional's instructions.

## 2019-06-21 NOTE — LETTER
June 21, 2019      Rosalba Marrero MD  [address] [place] [address]           [facility]  [address] [facility]  [place] [address]  Phone: [phone]  Fax: [phone]          Patient: Juanita Walton   MR Number: [id number]   YOB: 1955   Date of Visit: 6/21/2019       Dear Dr. Rosalba Marrero:    Thank you for referring Juanita Walton to me for evaluation. Attached you will find relevant portions of my assessment and plan of care.    If you have questions, please do not hesitate to call me. I look forward to following Juanita Walton along with you.    Sincerely,    Jun Mena MD    Enclosure  CC:  No Recipients    If you would like to receive this communication electronically, please contact [email or web address] or [phone] to request more information on [facility] [facility] access.    For providers and/or their staff who would like to refer a patient to [facility], please contact us through our one-stop-shop provider referral line, [facility], at [phone].    If you feel you have received this communication in error or would no longer like to receive these types of communications, please e-mail [email or web address]

## 2019-06-25 ENCOUNTER — PROCEDURE VISIT (OUTPATIENT)
Dept: OBSTETRICS AND GYNECOLOGY | Facility: CLINIC | Age: 64
End: 2019-06-25
Payer: COMMERCIAL

## 2019-06-25 VITALS — BODY MASS INDEX: 28.38 KG/M2 | WEIGHT: 166.25 LBS | HEIGHT: 64 IN

## 2019-06-25 DIAGNOSIS — R87.810 CERVICAL HIGH RISK HPV (HUMAN PAPILLOMAVIRUS) TEST POSITIVE: Primary | ICD-10-CM

## 2019-06-25 PROCEDURE — 57452 EXAM OF CERVIX W/SCOPE: CPT | Mod: S$GLB,,, | Performed by: OBSTETRICS & GYNECOLOGY

## 2019-06-25 PROCEDURE — 57452 COLPOSCOPY-TODAY: ICD-10-PCS | Mod: S$GLB,,, | Performed by: OBSTETRICS & GYNECOLOGY

## 2019-06-25 NOTE — PROCEDURES
Colposcopy-Today  Date/Time: 2019 5:19 PM  Performed by: Jun Mena MD  Authorized by: Jun Mena MD     Consent Done?:  Yes (Written)  Assistants?: No      Colposcopy Site:  Cervix and Vagina  Position:  Supine  Acrowhite Lesion: No    Atypical Vessels: No    Transformation Zone Adequate?: Yes    Biopsy?: No    ECC Performed?: No    LEEP Performed?: No    Estimated blood loss (cc):  0   Patient tolerated the procedure well with no immediate complications.   Post-operative instructions were provided for the patient.   Patient was discharged and will follow up if any complications occur     COLPOSCOPY:    Juanita Walton is a 64 y.o. female   presents for colposcopy.  No LMP recorded. Patient is postmenopausal..  Her most recent pap smear shows NILM with positive HPV 16 and other HR types.      The abnormal test findings were discussed, as well as HPV infection, need for colposcopy and possible biopsies to determine the plan of care, treatments available, the minimal risk of bleeding and infection with colposcopy, and alternatives to colposcopy.  Pt voiced understanding and desires to proceed.      UPT is negative    COLPOSCOPY EXAM:   TIME OUT PERFORMED.     No gross vulvovaginal or cervix lesions noted.  On colpo: no visible lesions, no mosaicism, no punctation and no abnormal vasculature  No biopsies.  ECC was not performed.  SCJ was entirely visualized.    The speculum was removed.  The patient did tolerate the procedure well.    All collected specimens sent to pathology for histologic analysis.    Post-colposcopy counseling:  The patient was instructed to manage post-colposcopy cramping with NSAIDs or Tylenol, or with a prescription per the medication card.  Avoid intercourse, douching, or tampons in the vagina for at least 2-3 days.  Expect a clumpy blackish discharge due to Monsel's solution application for several days.  Report heavy bleeding, worsening pain or pain that does not  respond to above medications, or foul-smelling vaginal discharge. HPV vaccine recommended according to FDA age guidelines.  Importance of follow-up stressed.      Follow up based on colposcopy results.  Impression:  Negative.  Recommend follow-up pap in 12 months.

## 2019-07-29 ENCOUNTER — PATIENT MESSAGE (OUTPATIENT)
Dept: RHEUMATOLOGY | Facility: CLINIC | Age: 64
End: 2019-07-29

## 2019-07-29 DIAGNOSIS — M35.9 UNDIFFERENTIATED CONNECTIVE TISSUE DISEASE: ICD-10-CM

## 2019-07-29 DIAGNOSIS — M35.00 SICCA SYNDROME: ICD-10-CM

## 2019-07-29 RX ORDER — HYDROXYCHLOROQUINE SULFATE 200 MG/1
200 TABLET, FILM COATED ORAL DAILY
Qty: 90 TABLET | Refills: 3 | Status: SHIPPED | OUTPATIENT
Start: 2019-07-29 | End: 2019-08-02 | Stop reason: SDUPTHER

## 2019-08-02 DIAGNOSIS — M35.9 UNDIFFERENTIATED CONNECTIVE TISSUE DISEASE: ICD-10-CM

## 2019-08-02 DIAGNOSIS — M35.00 SICCA SYNDROME: ICD-10-CM

## 2019-08-05 RX ORDER — HYDROXYCHLOROQUINE SULFATE 200 MG/1
TABLET, FILM COATED ORAL
Qty: 180 TABLET | Refills: 0 | Status: SHIPPED | OUTPATIENT
Start: 2019-08-05 | End: 2019-08-22 | Stop reason: SDUPTHER

## 2019-08-07 ENCOUNTER — PATIENT MESSAGE (OUTPATIENT)
Dept: ADMINISTRATIVE | Facility: OTHER | Age: 64
End: 2019-08-07

## 2019-08-12 ENCOUNTER — PATIENT MESSAGE (OUTPATIENT)
Dept: ADMINISTRATIVE | Facility: OTHER | Age: 64
End: 2019-08-12

## 2019-08-13 ENCOUNTER — PATIENT MESSAGE (OUTPATIENT)
Dept: RHEUMATOLOGY | Facility: CLINIC | Age: 64
End: 2019-08-13

## 2019-08-22 ENCOUNTER — OFFICE VISIT (OUTPATIENT)
Dept: RHEUMATOLOGY | Facility: CLINIC | Age: 64
End: 2019-08-22
Payer: COMMERCIAL

## 2019-08-22 VITALS
DIASTOLIC BLOOD PRESSURE: 77 MMHG | WEIGHT: 159.19 LBS | SYSTOLIC BLOOD PRESSURE: 138 MMHG | BODY MASS INDEX: 27.18 KG/M2 | HEIGHT: 64 IN | HEART RATE: 91 BPM

## 2019-08-22 DIAGNOSIS — R76.8 POSITIVE ANTI-CCP TEST: ICD-10-CM

## 2019-08-22 DIAGNOSIS — D89.2 HYPERGAMMAGLOBULINEMIA: ICD-10-CM

## 2019-08-22 DIAGNOSIS — M35.9 UNDIFFERENTIATED CONNECTIVE TISSUE DISEASE: Primary | ICD-10-CM

## 2019-08-22 DIAGNOSIS — K51.30 ULCERATIVE RECTOSIGMOIDITIS WITHOUT COMPLICATION: ICD-10-CM

## 2019-08-22 DIAGNOSIS — M35.00 SICCA SYNDROME: ICD-10-CM

## 2019-08-22 PROCEDURE — 99999 PR PBB SHADOW E&M-EST. PATIENT-LVL III: ICD-10-PCS | Mod: PBBFAC,,, | Performed by: PHYSICIAN ASSISTANT

## 2019-08-22 PROCEDURE — 3008F PR BODY MASS INDEX (BMI) DOCUMENTED: ICD-10-PCS | Mod: CPTII,S$GLB,, | Performed by: PHYSICIAN ASSISTANT

## 2019-08-22 PROCEDURE — 3078F DIAST BP <80 MM HG: CPT | Mod: CPTII,S$GLB,, | Performed by: PHYSICIAN ASSISTANT

## 2019-08-22 PROCEDURE — 3075F SYST BP GE 130 - 139MM HG: CPT | Mod: CPTII,S$GLB,, | Performed by: PHYSICIAN ASSISTANT

## 2019-08-22 PROCEDURE — 3075F PR MOST RECENT SYSTOLIC BLOOD PRESS GE 130-139MM HG: ICD-10-PCS | Mod: CPTII,S$GLB,, | Performed by: PHYSICIAN ASSISTANT

## 2019-08-22 PROCEDURE — 99999 PR PBB SHADOW E&M-EST. PATIENT-LVL III: CPT | Mod: PBBFAC,,, | Performed by: PHYSICIAN ASSISTANT

## 2019-08-22 PROCEDURE — 3008F BODY MASS INDEX DOCD: CPT | Mod: CPTII,S$GLB,, | Performed by: PHYSICIAN ASSISTANT

## 2019-08-22 PROCEDURE — 99214 PR OFFICE/OUTPT VISIT, EST, LEVL IV, 30-39 MIN: ICD-10-PCS | Mod: S$GLB,,, | Performed by: PHYSICIAN ASSISTANT

## 2019-08-22 PROCEDURE — 3078F PR MOST RECENT DIASTOLIC BLOOD PRESSURE < 80 MM HG: ICD-10-PCS | Mod: CPTII,S$GLB,, | Performed by: PHYSICIAN ASSISTANT

## 2019-08-22 PROCEDURE — 99214 OFFICE O/P EST MOD 30 MIN: CPT | Mod: S$GLB,,, | Performed by: PHYSICIAN ASSISTANT

## 2019-08-22 RX ORDER — HYDROXYCHLOROQUINE SULFATE 200 MG/1
200 TABLET, FILM COATED ORAL DAILY
Qty: 90 TABLET | Refills: 3 | Status: SHIPPED | OUTPATIENT
Start: 2019-08-22 | End: 2020-04-13 | Stop reason: SDUPTHER

## 2019-08-22 NOTE — PROGRESS NOTES
Subjective:       Patient ID: Juanita Walton is a 64 y.o. female.    Chief Complaint: Disease Management (UCTD)    Juanita back today for rheumatology followup.      She has chronic undifferentiated connective tissue disease with a positive ARMAAN, negative autoantibody profile, positive rheumatoid factor (500) , positive CCP(94).  Family history rheumatoid arthritis.  She has + Mild dry mouth and dry eyes stable on salagen orally 1-2 times a day.  Over-the-counter biotene products for mouth and otc rewetting drops for her eyes.    Last armaan 2/2019- neg. Has been doing well. > 10 yrs plaq. Was initially on 400 mg/d (5.4 m g/kg)- last year we cut dose back but she is still taking twice daily forgot to cut back, we want to minimized long term eye risk, taking  plaquenil 200 mg once daily (2.77 mg/kg X 14 yrs.) will help.   regular eye checks with no plaquenil toxicity issues. No changes since last visit. Due for eye check now     Dry mouth on salagen, chews gum. Minimal eye dryness at night sometimes, uses wetting drops.    she has not developed any signs of inflammatory arthritis, no significant morning stiffness. She denies mouth ulcers, no Raynaud's, no rashes, no chest pain, no pleuritic chest pain, no fevers, no weight loss, no parotitis   She dose have Ulcerative colitis which has been stable on colozal.      Left 5th dip tenderness- last x-ray showed mild djd, recently increased pain with intermittent swelling, no associated warmth, redness, this is better since last visit, some left thumb triggering but now better.  no other msk issues. Pain today 2/10      We did [facility] hand and foot X-ray 2016  left 1st mtp hallux valgus and cmc OA both hands. No erosions or signs of RA. on flaxseed oil 4000 mg daily,  Daily MV, and glucosamine/chondrontin.      Review of Systems   Constitutional: Negative.  Negative for activity change, appetite change, chills, fatigue and fever.   HENT: Negative.  Negative for mouth sores and  "trouble swallowing.         No dry mouth   Eyes: Negative.  Negative for photophobia, pain and redness.        No swollen or red eyes, no dry eye     Respiratory: Negative.  Negative for chest tightness, shortness of breath, wheezing and stridor.    Cardiovascular: Negative.  Negative for chest pain.   Gastrointestinal: Negative.  Negative for abdominal pain, blood in stool, diarrhea, nausea and vomiting.   Genitourinary: Negative.  Negative for dysuria, frequency, hematuria and urgency.   Musculoskeletal: Positive for arthralgias. Negative for back pain, gait problem, joint swelling, myalgias, neck pain and neck stiffness.   Skin: Negative.  Negative for color change, pallor and rash.   Neurological: Negative.  Negative for weakness.   Hematological: Negative for adenopathy.   Psychiatric/Behavioral: Negative for suicidal ideas.         Objective:     Past Medical History:   Diagnosis Date    Anemia     Essential hypertension     History of HPV infection     Hyperlipidemia     Overweight (BMI 25.0-29.9)     Sicca syndrome     Ulcerative colitis     Undifferentiated connective tissue disease       Immunization History   Administered Date(s) Administered    Influenza 12/12/2006, 10/24/2017, 11/10/2018    Influenza - Quadrivalent 11/10/2014, 01/04/2016, 10/25/2016    Influenza Split 12/09/2005, 11/17/2008, 11/18/2011, 12/19/2012, 11/10/2013    Pneumococcal Conjugate - 13 Valent 01/19/2017    Tdap 12/14/2017       /77   Pulse 91   Ht 5' 4" (1.626 m)   Wt 72.2 kg (159 lb 2.8 oz)   BMI 27.32 kg/m²      Physical Exam   Constitutional: She is oriented to person, place, and time and well-developed, well-nourished, and in no distress. No distress.   HENT:   Head: Normocephalic and atraumatic.   Right Ear: External ear normal.   Left Ear: External ear normal.   Mouth/Throat: No oropharyngeal exudate.   Eyes: Conjunctivae and EOM are normal. Pupils are equal, round, and reactive to light. No scleral " icterus.   Neck: Normal range of motion. Neck supple. No thyromegaly present.   Cardiovascular: Normal rate, regular rhythm and normal heart sounds.    No murmur heard.  Pulmonary/Chest: Effort normal and breath sounds normal. She exhibits no tenderness.   Abdominal: Soft. Bowel sounds are normal.   Lymphadenopathy:     She has no cervical adenopathy.   Neurological: She is alert and oriented to person, place, and time. She displays normal reflexes. No cranial nerve deficit. She exhibits normal muscle tone. Gait normal.   Skin: Skin is warm and dry. No rash noted.     Musculoskeletal: Normal range of motion. She exhibits deformity. She exhibits no edema or tenderness.   Some heberden nodes left 5th dip  No triggering today   No synovitis              2/2019- [facility]     FINDINGS:  Similar mild osteoarthritic changes present without acute osseous abnormality.  No concerning erosion or focal soft tissue abnormality.                          2/15/19 Labs at [facility]  CMP and cbc wnl  Vit D 30  IGG 1873 (^),  (^), IGM 89  C4 high 46, C3 normal 160  ARMAAN neg  CRA enl 3.7  ua normal   spep and immunofixation are still pending       Labs done [facility] 8/30/18  Cbc looks good normal wbc, plt, h&h  Neg armaan  Normal complement   Normal spep, no monoclonal spikes  IGA elevated 391, IGg and IGM wnl  Liver test good  Creatinine 1.0 e GFR 68            No results found for: TBGOLDPLUS  Lab Results   Component Value Date    HEPAIGM Negative 04/28/2004    HEPBIGM Negative 04/28/2004    HEPCAB Negative 04/28/2004 2/27/19 [facility] x-[facility] FINDINGS:  Similar mild osteoarthritic changes present without acute osseous abnormality.  No concerning erosion or focal soft tissue abnormality.    Assessment:       1. Undifferentiated connective tissue disease    2. Sicca syndrome    3. Positive anti-CCP test    4. 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 Undiff CTD with  + ARMAAN,  neg profile, + RF, + CCP with mouth and eye dryness but no signs of RA, myositis, lupus, myositis currently this most likely is Sjogren's, watching for possible overlap with RA     Stable on plaquenil Q day  2.77 mg/kg X 14 yrs  No retinal toxicity, yearly eye checks     2. Ulcerative Colitis on Colozal per GI- stable     3. Hypergammaglobulinemia   Elevated esr with prior elevated IgA and IGG, no monoclonal spikes on immunofixation, slightly elevated gamma on spep - repeat normal - will follow - spep and immunofixation pending  Repeat IGG 1873, - has trended upward over time       Plan:         Labs prior to next visit   Orders Placed This Encounter   Procedures    ELIEL Screen w/Reflex    CK    Urinalysis    C3 complement    C4 complement    Sedimentation rate    C-reactive protein    Comprehensive metabolic panel    CBC auto differential     Cut back on  plaquenil once daily,- sent new script for once daily, no 2 daily  yearly eye checks    C/w salagen 1-2 daily, can go up to tid if needed  C/w otc products for dryness  C/w gluc/chondrontin, flaxseed and daily MV    Gi for her UC, stable on colozal     Follow her immunoglobulin, spep and immunofixation yearly    X-ray [facility] hands utd 2/2019 repeat in 1 yr  - gianni with + ccp- nodule/bone spur left 5th dip joint    rtc 6 mon with labs done at [facility]    Please call or send portal message with any questions or concerns

## 2019-08-23 ENCOUNTER — PATIENT OUTREACH (OUTPATIENT)
Dept: OTHER | Facility: OTHER | Age: 64
End: 2019-08-23

## 2019-08-23 NOTE — LETTER
September 5, 2019     Juanita Walton  [address]  [place] [address]       Dear Juanita,    Welcome to [facility]! Our goal is to make care effective, proactive and convenient by using data you send us from home to better treat your chronic conditions.          My name is Dinora Harris, and I am your dedicated Digital Medicine clinician. As an expert in medication management, I will help ensure that the medications you are taking continue to provide the intended benefits and help you reach your goals. You can reach me directly at [phone] or by sending me a message directly through your [facility] account.      I am Camilo Lopez and I will be your health . My job is to help you identify lifestyle changes to improve your disease control. We will talk about nutrition, exercise, and other ways you may be able to adjust your current habits to better your health. Additionally, we will help ensure you are completing the tests and screenings that are necessary to help manage your conditions. You can reach me directly at  or by sending me a message directly through your [facility] account.    Most importantly, YOU are at the center of this team. Together, we will work to improve your overall health and encourage you to meet your goals for a healthier lifestyle.     What we expect from YOU:  · Please take frequent home blood pressure measurements. We ask that you take at least 1 blood pressure reading per week, but more information will better help us get you know you. Be sure you rest for a few minutes before taking the reading in a quiet, comfortable place.     Be available to receive phone calls or [facility] messages, when appropriate, from your care team. Please let us know if there are any specific days or times that work best for us to reach you via phone.     Complete routine tests and screenings. Dont worry, we will help keep you on track!           What you should  expect from your Digital Medicine Care Team:   We will work with you to create a personalized plan of care and provide you with encouragement and education, including regarding lifestyle changes, that could help you manage your disease states.     We will adjust your current medications, if needed, and continue to monitor your long-term progress.     We will provide you and your physician with monthly progress reports after you have been in the program for more than 30 days.     We will send you reminders through [facility] and text messages to help ensure you do not miss any testing deadlines to help manage your disease states.    You will be able to reach us by phone or through your [facility] account by clicking our names under Care Team on the right side of the home screen.    I look forward to working with you to achieve your blood pressure goals!    We look forward to working with you to help manage your health,    Sincerely,    Your Digital Medicine Team    Please visit our websites to learn more:   · Hypertension: [email or web address]      Remember, we are not available for emergencies. If you have an emergency, please contact your doctors office directly or call [facility] ([phone] or [phone]) or [phone].

## 2019-09-06 ENCOUNTER — PATIENT OUTREACH (OUTPATIENT)
Dept: OTHER | Facility: OTHER | Age: 64
End: 2019-09-06

## 2019-09-06 NOTE — PROGRESS NOTES
"Last 5 Patient Entered Readings                                      Current 30 Day Average: 129/76     Recent Readings 9/5/2019 9/5/2019 9/5/2019 9/3/2019 9/2/2019    SBP (mmHg) 139 164 158 138 128    DBP (mmHg) 80 94 84 77 75    Pulse 65 68 78 63 67          Digital Medicine: Health  Introduction    Introduced Ms. Juanita Walton to Digital Medicine. Discussed health  role and recommended lifestyle modifications.     Lifestyle Assessment:  Current Dietary Habits(i.e. low sodium, food labels, dining out): Ms. Grant states that she tries to cook at home as much as possible. She said that she enjoys eating vegetables. She stated that she will eat spinach for fiber, broccoli, and mustard greens. Ms. Grant said she enjoys eating a lot of baked chicken, but will sometimes have fried chicken. She states that when she cooks she is conscious and tries to use mostly olive oil. She said that she will eat brown rice but "not too much" with beans and "leafy greens." She said she will sometimes have a potato and butter with a piece of meat, but not often. Ms. Grant states that she loves to go out to eat but will sometimes turn it down because the food will be too salty. She said that she will watch her salt intake, but is not always aware of it. She said that she will sometimes look at the sodium content on the products she is buying. She also said that when her daughter cooks she does not use salt but she will sprinkle "a little bit" on her plate.     Exercise: Ms. rGant states that she is very active throughout the day. She states that her part time job requires her to move around a lot. This mostly consists of walking. She states that she is also active with her grand-kids. She gets them ready for school and brings them in the mornings. Ms. Grant expressed that she is interested in doing yoga and breathing techniques to help manage her stress throughout the day. We discussed me emailing her some home " "yoga videos and breathing techniques that she can incorporate into her daily routine.    Alcohol/Tobacco: Ms. Grant states that she has never used tobacco. She said that she will drink wine, but it is "not and every week ,every day thing." If she does have a glass of wine, it is only at night and one glass.    Medication Adherence: has been compliant with the medicaiton regimen     Ms. Grant states that not often, but in the past she may get light headed when standing up to fast. It was suggested to hold on to something when standing or to sit on the edge of the bed for a few minutes before standing up.     Reviewed AHA/AACE recommendations:  Limit sodium intake to <2000mg/day      Reviewed the importance of self-monitoring, medication adherence, and that the health  can be used as a resource for lifestyle modifications to help reduce or maintain a healthy lifestyle.  Reviewed that the Digital Medicine team is not available for emergencies and instructed the patient to call 911 or [facility] ([phone] or [phone]) if one arises.      "

## 2019-09-20 ENCOUNTER — PATIENT MESSAGE (OUTPATIENT)
Dept: ADMINISTRATIVE | Facility: OTHER | Age: 64
End: 2019-09-20

## 2019-09-25 ENCOUNTER — PATIENT OUTREACH (OUTPATIENT)
Dept: OTHER | Facility: OTHER | Age: 64
End: 2019-09-25

## 2019-09-25 ENCOUNTER — IMMUNIZATION (OUTPATIENT)
Dept: PHARMACY | Facility: CLINIC | Age: 64
End: 2019-09-25
Payer: COMMERCIAL

## 2019-09-25 NOTE — PROGRESS NOTES
Digital Medicine: Health  Follow-Up    The history is provided by the patient. No  was used.     Follow Up  Follow-up reason(s): reading review and goal follow-up              Diet:   Patient reports eating or drinking the following: fruit, water and fresh vegetablesShe has the following dietary restrictions: noneShe does not skip meals regularly.      The patient states that she typically eats a non-home cooked meal (ex: dining out, take out, frozen meals) 1-2 times per week.  She cooks for self.    Patient does the shopping for groceries.      Barriers to a Healthy Diet: no barriers to healthy eating    Ms. Grant states she has cut out stopping for food on her way home from work and will now wait until she gets home to cook her own foods. She states that this has helped keep her sodium intake down. Praise was given for this change. Ms. Grant states that she does drink water throughout the day, but that she would like to start drinking more. She states that she could bring a bottle from home to work with her to increase intake.    Intervention(s): low sodium diet education, reducing dining out and increasing water intake    Physical Activity:   When asked if exercising, patient responded: yesHer level of intensity when exercising is low.    Patient participates in the following activities: walking and yoga/stretching    She identified the following barriers to physical activity: no barriers to being active    Ms. Grant is still active at work. She states that she has been doing breathing techniques that she finds has been helping her relax. Ms. Grant has done the yoga video sent to her and states that she liked it and it was something that she would be interested in continuing.     Medication Adherence:   She misses doses: never    Patient is not selectively taking diuretics.    She does not wonder if medications are working.  She knows purpose of medications.      Patient  identified the following reasons for non-compliance: none      INTERVENTION(S)  encouragement/support    PLAN  patient verbalizes understanding, demonstrates understanding via teach back and patient amenable to changes      There are no preventive care reminders to display for this patient.    Last 5 Patient Entered Readings                                      Current 30 Day Average: 132/77     Recent Readings 9/18/2019 9/12/2019 9/11/2019 9/10/2019 9/5/2019    SBP (mmHg) 130 127 139 150 139    DBP (mmHg) 74 74 79 82 80    Pulse 68 70 64 63 65

## 2019-09-30 ENCOUNTER — PATIENT OUTREACH (OUTPATIENT)
Dept: OTHER | Facility: OTHER | Age: 64
End: 2019-09-30

## 2019-09-30 DIAGNOSIS — I10 ESSENTIAL HYPERTENSION: Primary | Chronic | ICD-10-CM

## 2019-09-30 NOTE — PROGRESS NOTES
09/30/19 2:23 PM - Called patient and left voicemail with call back number. Will follow up with patient at next encounter.   10/14/19 4:04 PM - Called patient and left voicemail with call back number. Will follow up with patient at next encounter.

## 2019-10-16 ENCOUNTER — PATIENT OUTREACH (OUTPATIENT)
Dept: OTHER | Facility: OTHER | Age: 64
End: 2019-10-16

## 2019-10-16 NOTE — PROGRESS NOTES
Digital Medicine: Health  Follow-Up    The history is provided by the patient. No  was used.     Follow Up  Follow-up reason(s): reading review      Readings are trending down due to lifestyle change.          Diet:   Patient reports eating or drinking the following: water, fresh vegetables and home cooked meals    Ms. Walton reports eating at home more. She eats green vegetables, brown rice with beans, and protein. She states she hardly cooks with salt. Ms. Walton reports her water intake has been better but she is still working on increasing it. She keeps a bottle of water with her at all times. Praise was given for maintaining lifestyle modifications.     Intervention(s): increasing water intake    Physical Activity:       Ms. Walton is still active with bringing her grandkids to school and moving around daily with work. Praise was given for staying active.    Medication Adherence:   She misses doses: never      Ms. Walton is compliant with medications and reports no issues.    INTERVENTION(S)  encouragement/support    PLAN  Clinician follow-up    There are no preventive care reminders to display for this patient.    Last 5 Patient Entered Readings                                      Current 30 Day Average: 128/73     Recent Readings 10/15/2019 10/7/2019 10/5/2019 9/27/2019 9/18/2019    SBP (mmHg) 126 139 120 123 130    DBP (mmHg) 77 78 68 69 74    Pulse 63 68 72 62 68

## 2019-10-17 NOTE — PROGRESS NOTES
Digital Medicine: Clinician Introduction    Juanita Walton is a 64 y.o. female who is newly enrolled in the Digital Medicine Clinic.    The following information was reviewed and updated:  Preferred pharmacy   [id number] - [place], [place] - [address]  [address]  [place] [address]  Phone: [phone] Fax: [phone]    [id number] - [place], [place] - [address]  [address]  [place] [address]  Phone: [phone] Fax: [phone]    [id number] - [place] - [address] AT [facility] [address]  [address]  [place] [address]  Phone: [phone] Fax: [phone]      Patient prefers a 30 days supply.     Review of patient's allergies indicates:  No Known Allergies    Patient endorses adherence to medication regimen. Patient denies hypotensive s/sx (lightheadedness, dizziness, nausea, fatigue); patient denies hypertensive s/sx (SOB, CP, severe headaches, changes in vision). Instructed patient to seek medical care if BP > 180/110 and is accompanied by hypertensive s/sx associated, patient confirms understanding. Patient gets at least 4 hours per night on her CPAP machine. She reports that she has not yet charged her BP cuff since receiving it.        The history is provided by the patient. No  was used.     HYPERTENSION  Our goal is to get BP to consistently below 130/80mmHg and make the process convenient so patient can avoid extra trips to the office. Getting your blood pressure below 130/80mmHg (definition of control) will reduce your risk for heart attack, kidney failure, stroke and death (as well as kidney failure, eye disease, & dementia)      Reviewed non-pharmacologic therapies and impact on BP      Explained that we expect patient to obtain several blood pressures per week at random times of day.  Instructed patient not to  allow anyone else to use phone and monitoring device.  Confirmed appropriate BP monitoring technique.      Explained to patient that the digital medicine team is not available for emergencies.  Patient will call [facility] ([phone] or [phone]) or 911 if needed.    Patient's BP goal is 130/80. Patients BP average is 128/73 mmHg, which is at or below goal, per 2017 ACC/AHA Hypertension Guidelines.      Med Review complete.    Allergies reviewed.      Last 5 Patient Entered Readings                                      Current 30 Day Average: 128/73     Recent Readings 10/15/2019 10/7/2019 10/5/2019 9/27/2019 9/18/2019    SBP (mmHg) 126 139 120 123 130    DBP (mmHg) 77 78 68 69 74    Pulse 63 68 72 62 68            Sleep Apnea  Patient previously diagnosed with MOHAMUD and is not interested in a referral at this time.      She reports she is currently using CPAP for 4 hour(s) per night. MOHAMUD is managed effectively with CPAP use.    Medication Affordability  Patient is currently not having problems affording medications    Medication Adherence:   She misses doses: once a week    if she happens to miss her medication, it is never more than once a week.  Patient is not selectively taking diuretics.    She does not wonder if medications are working.  She knows purpose of medications.      Patient identified the following reasons for non-compliance: none    Adherence tools used: none    Assessment:  Reviewed recent readings. Per 2017 ACC/ AHA HTN guidelines (goal of BP < 130/80), current 30-day average is well controlled.       INTERVENTION(S)  reviewed appropriate dose schedule, reviewed monitoring technique, encouragement/support and goal setting    PLAN  patient verbalizes understanding and demonstrates understanding via teach back    Continue current medication regimen. Reviewed proper measurement techniques and encouraged patient to continue to check BP regularly. Reviewed BP goals and emergency  precautions. Encouraged patient to charge BP cuff at least once monthly. I will continue to monitor regularly and will follow-up in~4 weeks, sooner if blood pressure begins to trend upward or downward.       There are no preventive care reminders to display for this patient.    Current Medication Regimen:  Hypertension Medications             olmesartan-amLODIPin-hcthiazid 40-5-12.5 mg Tab           Reviewed the importance of self-monitoring, medication adherence, and that the health  can be used as a resource for lifestyle modifications to help reduce or maintain a healthy lifestyle.    Sent link to [facility] webpages and my contact information via [employer] for future questions. Follow up scheduled.

## 2019-11-14 ENCOUNTER — PATIENT OUTREACH (OUTPATIENT)
Dept: OTHER | Facility: OTHER | Age: 64
End: 2019-11-14

## 2019-11-14 NOTE — PROGRESS NOTES
11/14/19 10:32 AM - Called patient and left voicemail with call back number. Will follow up with patient at next encounter.   05/19/2020 - chart review completed. Patient's BP average is 125/71 mmHg and no changes to antihypertensive medication

## 2019-11-19 ENCOUNTER — PATIENT OUTREACH (OUTPATIENT)
Dept: OTHER | Facility: OTHER | Age: 64
End: 2019-11-19

## 2019-11-19 NOTE — PROGRESS NOTES
Digital Medicine: Health  Follow-Up    The history is provided by the patient. No  was used.     Follow Up  Follow-up reason(s): reading review      Readings are trending down due to lifestyle change.      INTERVENTION(S)  encouragement/support    PLAN  patient verbalizes understanding    There are no preventive care reminders to display for this patient.    Last 5 Patient Entered Readings                                      Current 30 Day Average: 128/72     Recent Readings 11/15/2019 11/13/2019 11/3/2019 10/29/2019 10/25/2019    SBP (mmHg) 132 123 139 116 115    DBP (mmHg) 73 72 78 66 66    Pulse 59 56 75 66 67            Diet Screening   No change to diet.      Ms. Walton states that she has been watching what she eats to contribute to her readings staying below goal. Praise was given.     Physical Activity Screening   When asked if exercising, patient responded: yes    She exercises for 15 minutes per day 3 day(s) a week.  Her level of intensity when exercising is low.    Patient participates in the following activities: yoga/stretching    She identified the following barriers to physical activity: no barriers to being active    Praise was given for physical activity.

## 2019-12-03 DIAGNOSIS — M35.00 SICCA SYNDROME: ICD-10-CM

## 2019-12-03 DIAGNOSIS — M35.9 UNDIFFERENTIATED CONNECTIVE TISSUE DISEASE: ICD-10-CM

## 2019-12-04 RX ORDER — HYDROXYCHLOROQUINE SULFATE 200 MG/1
TABLET, FILM COATED ORAL
Qty: 180 TABLET | Refills: 0 | Status: SHIPPED | OUTPATIENT
Start: 2019-12-04 | End: 2020-04-13

## 2019-12-14 ENCOUNTER — OFFICE VISIT (OUTPATIENT)
Dept: OPHTHALMOLOGY | Facility: CLINIC | Age: 64
End: 2019-12-14
Payer: COMMERCIAL

## 2019-12-14 DIAGNOSIS — H52.13 MYOPIA, BILATERAL: ICD-10-CM

## 2019-12-14 DIAGNOSIS — I10 ESSENTIAL HYPERTENSION: Chronic | ICD-10-CM

## 2019-12-14 DIAGNOSIS — Z79.899 LONG-TERM USE OF PLAQUENIL: ICD-10-CM

## 2019-12-14 DIAGNOSIS — H52.4 BILATERAL PRESBYOPIA: ICD-10-CM

## 2019-12-14 DIAGNOSIS — M35.9 UNDIFFERENTIATED CONNECTIVE TISSUE DISEASE: Primary | ICD-10-CM

## 2019-12-14 PROCEDURE — 99999 PR PBB SHADOW E&M-EST. PATIENT-LVL I: CPT | Mod: PBBFAC,,, | Performed by: OPTOMETRIST

## 2019-12-14 PROCEDURE — 92014 PR EYE EXAM, EST PATIENT,COMPREHESV: ICD-10-PCS | Mod: S$GLB,,, | Performed by: OPTOMETRIST

## 2019-12-14 PROCEDURE — 92014 COMPRE OPH EXAM EST PT 1/>: CPT | Mod: S$GLB,,, | Performed by: OPTOMETRIST

## 2019-12-14 PROCEDURE — 99999 PR PBB SHADOW E&M-EST. PATIENT-LVL I: ICD-10-PCS | Mod: PBBFAC,,, | Performed by: OPTOMETRIST

## 2019-12-14 PROCEDURE — 92015 DETERMINE REFRACTIVE STATE: CPT | Mod: S$GLB,,, | Performed by: OPTOMETRIST

## 2019-12-14 PROCEDURE — 92134 POSTERIOR SEGMENT OCT RETINA (OCULAR COHERENCE TOMOGRAPHY)-BOTH EYES: ICD-10-PCS | Mod: S$GLB,,, | Performed by: OPTOMETRIST

## 2019-12-14 PROCEDURE — 92082 HUMPHREY VISUAL FIELD-OU-INTERMEDIATE-BOTH EYES: ICD-10-PCS | Mod: S$GLB,,, | Performed by: OPTOMETRIST

## 2019-12-14 PROCEDURE — 92134 CPTRZ OPH DX IMG PST SGM RTA: CPT | Mod: S$GLB,,, | Performed by: OPTOMETRIST

## 2019-12-14 PROCEDURE — 92015 PR REFRACTION: ICD-10-PCS | Mod: S$GLB,,, | Performed by: OPTOMETRIST

## 2019-12-14 PROCEDURE — 92082 INTERMEDIATE VISUAL FIELD XM: CPT | Mod: S$GLB,,, | Performed by: OPTOMETRIST

## 2019-12-14 NOTE — PROGRESS NOTES
HPI     Decrease night visual acuity with glasses x 1 year.  Plaquenil check.  Last eye exam 03/12/2018 TRF.  Update glasses RX.  HX of undifferentiated connective tissus disease.    Last edited by Hanna Alvarado on 12/14/2019  8:56 AM. (History)            Assessment /Plan     For exam results, see Encounter Report.    Undifferentiated connective tissue disease  -     Pang Visual Field - OU - Intermediate - Both Eyes  -     Posterior Segment OCT Retina-Both eyes    Long-term use of Plaquenil  -     Pang Visual Field - OU - Intermediate - Both Eyes  -     Posterior Segment OCT Retina-Both eyes    Essential hypertension    Myopia, bilateral    Bilateral presbyopia      No active anterior or posterior uveitis OU.  No ocular changes from Plaquenil use    Normal VF and gOCT ou    No HTN Retinopathy    Dispense Final Rx for glasses.  RTC 1 year  Discussed above and answered questions.

## 2020-01-14 ENCOUNTER — PATIENT OUTREACH (OUTPATIENT)
Dept: OTHER | Facility: OTHER | Age: 65
End: 2020-01-14

## 2020-01-14 NOTE — PROGRESS NOTES
Digital Medicine: Health  Follow-Up    The history is provided by the patient. No  was used.     Follow Up  Follow-up reason(s): reading review    Ms. Walton is feeling good. She is satisfied with her blood pressure. When addressing her higher than normal reading of 140/82 on 1/11, she states that she did not do anything differently that day that could have contributed to this. After discussion, she states she was a little more active that day and may have no rested enough before taking her blood pressure.     INTERVENTION(S)  encouragement/support    There are no preventive care reminders to display for this patient.    Last 5 Patient Entered Readings                                      Current 30 Day Average: 126/71     Recent Readings 1/11/2020 1/7/2020 1/4/2020 12/26/2019 12/19/2019    SBP (mmHg) 140 139 129 110 116    DBP (mmHg) 82 79 70 65 63    Pulse 55 60 66 62 70            Physical Activity Screening   When asked if exercising, patient responded: yes    She exercises for 10 minutes per day 3 day(s) a week.      Patient participates in the following activities: yoga/stretching and home videos     She identified the following barriers to physical activity: no barriers to being active    Praise provided.     Medication Adherence Screening   She misses doses: never      Complaint with current medication regimen.

## 2020-02-19 LAB
ALBUMIN SERPL-MCNC: 4.4 G/DL (ref 3.8–4.8)
ALBUMIN/GLOB SERPL: 1.6 {RATIO} (ref 1.2–2.2)
ALP SERPL-CCNC: 60 IU/L (ref 39–117)
ALT SERPL-CCNC: 14 IU/L (ref 0–32)
ANA HOMOGEN TITR SER: ABNORMAL {TITER}
ANA TITR SER IF: POSITIVE {TITER}
AST SERPL-CCNC: 26 IU/L (ref 0–40)
BASOPHILS # BLD AUTO: 0 X10E3/UL (ref 0–0.2)
BASOPHILS NFR BLD AUTO: 1 %
BILIRUB SERPL-MCNC: 0.3 MG/DL (ref 0–1.2)
BUN SERPL-MCNC: 12 MG/DL (ref 8–27)
BUN/CREAT SERPL: 11 (ref 12–28)
C3 SERPL-MCNC: 123 MG/DL (ref 82–167)
C4 SERPL-MCNC: 37 MG/DL (ref 14–44)
CALCIUM SERPL-MCNC: 9.5 MG/DL (ref 8.7–10.3)
CENTROMERE B AB SER-ACNC: <0.2 AI (ref 0–0.9)
CHLORIDE SERPL-SCNC: 102 MMOL/L (ref 96–106)
CHROMATIN AB SERPL-ACNC: <0.2 AI (ref 0–0.9)
CK SERPL-CCNC: 203 U/L (ref 24–173)
CO2 SERPL-SCNC: 27 MMOL/L (ref 20–29)
CREAT SERPL-MCNC: 1.1 MG/DL (ref 0.57–1)
CRP SERPL-MCNC: <1 MG/L (ref 0–10)
DSDNA AB SER-ACNC: <1 IU/ML (ref 0–9)
ENA JO1 AB SER-ACNC: <0.2 AI (ref 0–0.9)
ENA RNP AB SER-ACNC: 0.2 AI (ref 0–0.9)
ENA SCL70 AB SER-ACNC: <0.2 AI (ref 0–0.9)
ENA SM AB SER-ACNC: <0.2 AI (ref 0–0.9)
ENA SM+RNP AB SER-ACNC: <0.2 AI (ref 0–0.9)
ENA SS-A AB SER-ACNC: <0.2 AI (ref 0–0.9)
ENA SS-B AB SER-ACNC: <0.2 AI (ref 0–0.9)
EOSINOPHIL # BLD AUTO: 0.1 X10E3/UL (ref 0–0.4)
EOSINOPHIL NFR BLD AUTO: 3 %
ERYTHROCYTE [DISTWIDTH] IN BLOOD BY AUTOMATED COUNT: 13.8 % (ref 11.7–15.4)
ERYTHROCYTE [SEDIMENTATION RATE] IN BLOOD BY WESTERGREN METHOD: 3 MM/HR (ref 0–40)
GLOBULIN SER CALC-MCNC: 2.7 G/DL (ref 1.5–4.5)
GLUCOSE SERPL-MCNC: 92 MG/DL (ref 65–99)
HCT VFR BLD AUTO: 39.3 % (ref 34–46.6)
HGB BLD-MCNC: 13.2 G/DL (ref 11.1–15.9)
IMM GRANULOCYTES # BLD AUTO: 0 X10E3/UL (ref 0–0.1)
IMM GRANULOCYTES NFR BLD AUTO: 0 %
LYMPHOCYTES # BLD AUTO: 1.2 X10E3/UL (ref 0.7–3.1)
LYMPHOCYTES NFR BLD AUTO: 36 %
Lab: ABNORMAL
MCH RBC QN AUTO: 31.2 PG (ref 26.6–33)
MCHC RBC AUTO-ENTMCNC: 33.6 G/DL (ref 31.5–35.7)
MCV RBC AUTO: 93 FL (ref 79–97)
MONOCYTES # BLD AUTO: 0.4 X10E3/UL (ref 0.1–0.9)
MONOCYTES NFR BLD AUTO: 11 %
NEUTROPHILS # BLD AUTO: 1.7 X10E3/UL (ref 1.4–7)
NEUTROPHILS NFR BLD AUTO: 49 %
NOTE: ABNORMAL
PLATELET # BLD AUTO: 228 X10E3/UL (ref 150–450)
POTASSIUM SERPL-SCNC: 4.2 MMOL/L (ref 3.5–5.2)
PROT SERPL-MCNC: 7.1 G/DL (ref 6–8.5)
RBC # BLD AUTO: 4.23 X10E6/UL (ref 3.77–5.28)
RIBOSOMAL P AB SER-ACNC: <0.2 AI (ref 0–0.9)
SODIUM SERPL-SCNC: 145 MMOL/L (ref 134–144)
WBC # BLD AUTO: 3.4 X10E3/UL (ref 3.4–10.8)

## 2020-02-21 ENCOUNTER — PATIENT MESSAGE (OUTPATIENT)
Dept: ADMINISTRATIVE | Facility: OTHER | Age: 65
End: 2020-02-21

## 2020-02-21 ENCOUNTER — PATIENT OUTREACH (OUTPATIENT)
Dept: ADMINISTRATIVE | Facility: HOSPITAL | Age: 65
End: 2020-02-21

## 2020-02-24 ENCOUNTER — TELEPHONE (OUTPATIENT)
Dept: RHEUMATOLOGY | Facility: CLINIC | Age: 65
End: 2020-02-24

## 2020-02-25 ENCOUNTER — OFFICE VISIT (OUTPATIENT)
Dept: RHEUMATOLOGY | Facility: CLINIC | Age: 65
End: 2020-02-25
Payer: COMMERCIAL

## 2020-02-25 VITALS
DIASTOLIC BLOOD PRESSURE: 71 MMHG | HEIGHT: 64 IN | WEIGHT: 165.56 LBS | SYSTOLIC BLOOD PRESSURE: 124 MMHG | HEART RATE: 70 BPM | BODY MASS INDEX: 28.27 KG/M2

## 2020-02-25 DIAGNOSIS — M35.9 UNDIFFERENTIATED CONNECTIVE TISSUE DISEASE: Primary | ICD-10-CM

## 2020-02-25 DIAGNOSIS — R76.8 POSITIVE ANTI-CCP TEST: ICD-10-CM

## 2020-02-25 DIAGNOSIS — M35.00 SICCA SYNDROME: ICD-10-CM

## 2020-02-25 DIAGNOSIS — K51.30 ULCERATIVE RECTOSIGMOIDITIS WITHOUT COMPLICATION: ICD-10-CM

## 2020-02-25 DIAGNOSIS — D89.2 HYPERGAMMAGLOBULINEMIA: ICD-10-CM

## 2020-02-25 PROCEDURE — 99999 PR PBB SHADOW E&M-EST. PATIENT-LVL III: CPT | Mod: PBBFAC,,, | Performed by: PHYSICIAN ASSISTANT

## 2020-02-25 PROCEDURE — 99214 PR OFFICE/OUTPT VISIT, EST, LEVL IV, 30-39 MIN: ICD-10-PCS | Mod: S$GLB,,, | Performed by: PHYSICIAN ASSISTANT

## 2020-02-25 PROCEDURE — 3074F SYST BP LT 130 MM HG: CPT | Mod: CPTII,S$GLB,, | Performed by: PHYSICIAN ASSISTANT

## 2020-02-25 PROCEDURE — 3078F PR MOST RECENT DIASTOLIC BLOOD PRESSURE < 80 MM HG: ICD-10-PCS | Mod: CPTII,S$GLB,, | Performed by: PHYSICIAN ASSISTANT

## 2020-02-25 PROCEDURE — 99999 PR PBB SHADOW E&M-EST. PATIENT-LVL III: ICD-10-PCS | Mod: PBBFAC,,, | Performed by: PHYSICIAN ASSISTANT

## 2020-02-25 PROCEDURE — 3008F PR BODY MASS INDEX (BMI) DOCUMENTED: ICD-10-PCS | Mod: CPTII,S$GLB,, | Performed by: PHYSICIAN ASSISTANT

## 2020-02-25 PROCEDURE — 99214 OFFICE O/P EST MOD 30 MIN: CPT | Mod: S$GLB,,, | Performed by: PHYSICIAN ASSISTANT

## 2020-02-25 PROCEDURE — 3008F BODY MASS INDEX DOCD: CPT | Mod: CPTII,S$GLB,, | Performed by: PHYSICIAN ASSISTANT

## 2020-02-25 PROCEDURE — 3078F DIAST BP <80 MM HG: CPT | Mod: CPTII,S$GLB,, | Performed by: PHYSICIAN ASSISTANT

## 2020-02-25 PROCEDURE — 3074F PR MOST RECENT SYSTOLIC BLOOD PRESSURE < 130 MM HG: ICD-10-PCS | Mod: CPTII,S$GLB,, | Performed by: PHYSICIAN ASSISTANT

## 2020-02-25 NOTE — PROGRESS NOTES
Subjective:       Patient ID: Juanita Walton is a 64 y.o. female.    Chief Complaint: UCTD    Juanita back today for rheumatology followup.      She has chronic undifferentiated connective tissue disease with a positive ARMAAN, negative autoantibody profile, positive rheumatoid factor (500) , positive CCP(94).  Family history rheumatoid arthritis.  She has + Mild dry mouth and dry eyes stable on salagen orally 1-2 times a day.  Over-the-counter biotene products for mouth and otc rewetting drops for her eyes.    Last armaan 2/2019- neg. Has been doing well. > 10 yrs plaq. Was initially on 400 mg/d (5.4 m g/kg)- last year we cut dose back but she is still taking twice daily forgot to cut back, we want to minimized long term eye risk, she is not  taking  plaquenil 200 mg once daily (2.77 mg/kg X 14 yrs.)   regular eye checks with no plaquenil toxicity issues. Last done jan 2020, all clear       She has not developed any signs of inflammatory arthritis, no significant morning stiffness. She denies mouth ulcers, no Raynaud's, no rashes, no chest pain, no pleuritic chest pain, no fevers, no weight loss, no parotitis   She dose have Ulcerative colitis which has been stable on colozal.       We did [facility] and foot X-ray 2016  left 1st mtp hallux valgus and cmc OA both hands. No erosions or signs of RA. on flaxseed oil 4000 mg daily,  Daily MV, and glucosamine/chondrontin.  Left 5th dip tenderness- last x-ray showed mild djd, no issues recently, some left thumb triggering but now better.  no other msk issues. Pain today 0/10         Review of Systems   Constitutional: Negative.  Negative for activity change, appetite change, chills, fatigue and fever.   HENT: Negative.  Negative for mouth sores and trouble swallowing.         No dry mouth   Eyes: Negative.  Negative for photophobia, pain and redness.        No swollen or red eyes, no dry eye     Respiratory: Negative.  Negative for chest tightness, shortness of breath,  "wheezing and stridor.    Cardiovascular: Negative.  Negative for chest pain.   Gastrointestinal: Negative.  Negative for abdominal pain, blood in stool, diarrhea, nausea and vomiting.   Genitourinary: Negative.  Negative for dysuria, frequency, hematuria and urgency.   Musculoskeletal: Negative for arthralgias, back pain, gait problem, joint swelling, myalgias, neck pain and neck stiffness.   Skin: Negative.  Negative for color change, pallor and rash.   Neurological: Negative.  Negative for weakness.   Hematological: Negative for adenopathy.   Psychiatric/Behavioral: Negative for suicidal ideas.         Objective:     Past Medical History:   Diagnosis Date    Anemia     Essential hypertension     History of HPV infection     Hyperlipidemia     Overweight (BMI 25.0-29.9)     Sicca syndrome     Ulcerative colitis     Undifferentiated connective tissue disease       Immunization History   Administered Date(s) Administered    Influenza 12/12/2006, 10/24/2017, 11/10/2018    Influenza - Quadrivalent 11/10/2014, 01/04/2016, 10/25/2016    Influenza - Quadrivalent - PF (6 months and older) 09/25/2019    Influenza Split 12/09/2005, 11/17/2008, 11/18/2011, 12/19/2012, 11/10/2013    Pneumococcal Conjugate - 13 Valent 01/19/2017    Tdap 12/14/2017       /71   Pulse 70   Ht 5' 4" (1.626 m)   Wt 75.1 kg (165 lb 9.1 oz)   BMI 28.42 kg/m²      Physical Exam   Constitutional: She is oriented to person, place, and time and well-developed, well-nourished, and in no distress. No distress.   HENT:   Head: Normocephalic and atraumatic.   Right Ear: External ear normal.   Left Ear: External ear normal.   Mouth/Throat: Mucous membranes are dry. No oropharyngeal exudate.       Eyes: EOM are normal. Pupils are equal, round, and reactive to light. Right conjunctiva is not injected. Right conjunctiva has no hemorrhage. Left conjunctiva is not injected. Left conjunctiva has no hemorrhage. No scleral icterus.       Neck: " Normal range of motion. Neck supple. No thyromegaly present.   Cardiovascular: Normal rate, regular rhythm and normal heart sounds.    No murmur heard.  Pulmonary/Chest: Effort normal and breath sounds normal. She exhibits no tenderness.   Abdominal: Soft. Bowel sounds are normal.   Lymphadenopathy:     She has no cervical adenopathy.   Neurological: She is alert and oriented to person, place, and time. She displays normal reflexes. No cranial nerve deficit. She exhibits normal muscle tone. Gait normal.   Skin: Skin is warm and dry. No rash noted.     Musculoskeletal: Normal range of motion. She exhibits deformity. She exhibits no edema or tenderness.   Some heberden nodes left 5th dip  No triggering today   No synovitis   No rash, no calcinosis  No Raynaud's            2/2019- [facility] x-[facility]     FINDINGS:  Similar mild osteoarthritic changes present without acute osseous abnormality.  No concerning erosion or focal soft tissue abnormality.        Recent Results (from the past 504 hour(s))   CBC auto differential    Collection Time: 02/18/20  8:01 AM   Result Value Ref Range    WBC 3.4 3.4 - 10.8 x10E3/uL    RBC 4.23 3.77 - 5.28 x10E6/uL    Hemoglobin 13.2 11.1 - 15.9 g/dL    Hematocrit 39.3 34.0 - 46.6 %    Mean Corpuscular Volume 93 79 - 97 fL    Mean Corpuscular Hemoglobin 31.2 26.6 - 33.0 pg    Mean Corpuscular Hemoglobin Conc 33.6 31.5 - 35.7 g/dL    RDW 13.8 11.7 - 15.4 %    Platelets 228 150 - 450 x10E3/uL    Neutrophils 49 Not Estab. %    Lymph% 36 Not Estab. %    Mono% 11 Not Estab. %    Eosinophil% 3 Not Estab. %    Basophil% 1 Not Estab. %    Neutrophils Absolute 1.7 1.4 - 7.0 x10E3/uL    Lymph # 1.2 0.7 - 3.1 x10E3/uL    Mono # 0.4 0.1 - 0.9 x10E3/uL    Eos # 0.1 0.0 - 0.4 x10E3/uL    Baso # 0.0 0.0 - 0.2 x10E3/uL    Immature Granulocytes 0 Not Estab. %    Immature Grans (Abs) 0.0 0.0 - 0.1 x10E3/uL   Comprehensive metabolic panel    Collection Time: 02/18/20  8:01 AM   Result Value Ref Range    Glucose  92 65 - 99 mg/dL    BUN, Bld 12 8 - 27 mg/dL    Creatinine 1.10 (H) 0.57 - 1.00 mg/dL    eGFR if non African American 54 (L) >59 mL/min/1.73    eGFR if African American 62 >59 mL/min/1.73    BUN/Creatinine Ratio 11 (L) 12 - 28    Sodium 145 (H) 134 - 144 mmol/L    Potassium 4.2 3.5 - 5.2 mmol/L    Chloride 102 96 - 106 mmol/L    CO2 27 20 - 29 mmol/L    Calcium 9.5 8.7 - 10.3 mg/dL    Total Protein 7.1 6.0 - 8.5 g/dL    Albumin 4.4 3.8 - 4.8 g/dL    Globulin, Total 2.7 1.5 - 4.5 g/dL    Albumin/Globulin Ratio 1.6 1.2 - 2.2    Total Bilirubin 0.3 0.0 - 1.2 mg/dL    Alkaline Phosphatase 60 39 - 117 IU/L    AST 26 0 - 40 IU/L    ALT 14 0 - 32 IU/L   C4 complement    Collection Time: 02/18/20  8:01 AM   Result Value Ref Range    C4 Complement 37 14 - 44 mg/dL   C3 complement    Collection Time: 02/18/20  8:01 AM   Result Value Ref Range    C3 Complement 123 82 - 167 mg/dL   ELIEL Scr,IFA W/Refl Titer/Pattern/MPX 11 AB Cascade    Collection Time: 02/18/20  8:01 AM   Result Value Ref Range    ELIEL Positive (A)    GABO+DNA/DS+Antich+Centro+FA...    Collection Time: 02/18/20  8:01 AM   Result Value Ref Range    Homogeneous Pattern 1:160 (H)     Note: Comment     ds DNA Ab <1 0 - 9 IU/mL    RNP Antibody 0.2 0.0 - 0.9 AI    [place] AB <0.2 0.0 - 0.9 AI    [place]/RNP Antibodies <0.2 0.0 - 0.9 AI    Scleroderma SCL- <0.2 0.0 - 0.9 AI    SSA Antibody <0.2 0.0 - 0.9 AI    SSB Antibody <0.2 0.0 - 0.9 AI    Antichromatin Antibodies <0.2 0.0 - 0.9 AI    Ribosomal P Ab <0.2 0.0 - 0.9 AI    Anti-ARIAN-1 Antibody <0.2 0.0 - 0.9 AI    Anti-Centromere Antibody <0.2 0.0 - 0.9 AI    See Below Comment    Sedimentation rate, automated    Collection Time: 02/18/20  8:01 AM   Result Value Ref Range    Sed Rate 3 0 - 40 mm/hr   CK    Collection Time: 02/18/20  8:01 AM   Result Value Ref Range    Total  (H) 24 - 173 U/L   C-reactive protein    Collection Time: 02/18/20  8:01 AM   Result Value Ref Range    CRP <1 0 - 10 mg/L               No results  found for: TBGOLDPLUS  Lab Results   Component Value Date    HEPAIGM Negative 04/28/2004    HEPBIGM Negative 04/28/2004    HEPCAB Negative 04/28/2004 2/27/19 [facility] x-rays FINDINGS:  Similar mild osteoarthritic changes present without acute osseous abnormality.  No concerning erosion or focal soft tissue abnormality.    Assessment:       1. Undifferentiated connective tissue disease    2. Hypergammaglobulinemia    3. Sicca syndrome    4. 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 Undiff CTD with  + ELIEL, neg profile, + RF, + CCP with mouth and eye dryness but no signs of RA, myositis, lupus, myositis currently this most likely is Sjogren's, watching for possible overlap with RA     Stable on plaquenil Q day  2.77 mg/kg X 14 yrs  No retinal toxicity, yearly eye checks     2. Ulcerative Colitis on Colozal per GI- stable     3. Hypergammaglobulinemia   Elevated esr with prior elevated IgA and IGG, no monoclonal spikes on immunofixation, slightly elevated gamma on spep - repeat normal - will follow - spep and immunofixation  Esr and crp now wnl  Repeat IGG 1873, - has trended upward over time - will repeat next visit       Plan:         Labs prior to next visit - follow immunoglobulin, cbc, cmp, esr, crp, c3, c4, ds-dna, ua    No orders of the defined types were placed in this encounter.    C/w   plaquenil once daily,  yearly eye checks    C/w salagen 1-2 daily, can go up to tid if needed  C/w otc products for dryness  C/w gluc/chondrontin, flaxseed and daily MV    Gi for her UC, stable on colozal     Follow her immunoglobulin, spep and immunofixation yearly    X-ray [facility] hands utd 2/2019 repeat in 1 yr  - gianni with + ccp- nodule/bone spur left 5th dip joint    rtc 6 mon with labs done at [facility] 1 wk prior     Please call or send portal message with any questions or concerns

## 2020-02-28 ENCOUNTER — PATIENT OUTREACH (OUTPATIENT)
Dept: OTHER | Facility: OTHER | Age: 65
End: 2020-02-28

## 2020-02-28 NOTE — PROGRESS NOTES
Digital Medicine: Health  Follow-Up    The history is provided by the patient. No  was used.     Follow Up  Follow-up reason(s): reading review    Feeling well. When addressing the few higher readings Ms. Walton had this past month, she states it could have been from not resting long enough before the readings. She states that sometimes she forgets to rest after moving around or cleaning the house. Encouraged her to at least take a few breaths before starting her reading.     INTERVENTION(S)  encouragement/support    There are no preventive care reminders to display for this patient.    Last 5 Patient Entered Readings                                      Current 30 Day Average: 129/74     Recent Readings 2/26/2020 2/25/2020 2/22/2020 2/14/2020 2/11/2020    SBP (mmHg) 127 139 108 135 132    DBP (mmHg) 76 77 66 75 74    Pulse 61 65 59 62 67            Diet Screening       Ms. Walton associates some of her higher readings this past month with foods that she has eaten. She states she does better when she cooks at home. Encouraged Ms. Walton with helpful tips such as getting sauces on the side and portion control when eating out to maintain sodium intake.     Physical Activity Screening   When asked if exercising, patient responded: yes    Patient participates in the following activities: yoga/stretching and walking    Ms. Walton states that she is still doing the yoga videos, but has also started walking around a park near her home. She states she walks about 1 mile once or twice a week along with the yoga video. Praise provided for increase in physical activity.     Medication Adherence Screening   She did not miss a dose this month.    Compliant with current medication regimen. Ms. Walton states that she has 2 medications for her blood pressure but from what she understands, she is able to get these medications in one pill. She would like to know what the difference is and if she could  possibly make this change to reduce the amount of medications she is having to keep track of. Will route note to PharmD. Patient was also made aware PharmD has been attempting to reach out and her number is a different number than mine.

## 2020-03-08 ENCOUNTER — PATIENT MESSAGE (OUTPATIENT)
Dept: ADMINISTRATIVE | Facility: OTHER | Age: 65
End: 2020-03-08

## 2020-04-11 DIAGNOSIS — M35.00 SICCA SYNDROME: ICD-10-CM

## 2020-04-11 DIAGNOSIS — M35.9 UNDIFFERENTIATED CONNECTIVE TISSUE DISEASE: ICD-10-CM

## 2020-04-13 ENCOUNTER — PATIENT OUTREACH (OUTPATIENT)
Dept: OTHER | Facility: OTHER | Age: 65
End: 2020-04-13

## 2020-04-13 RX ORDER — HYDROXYCHLOROQUINE SULFATE 200 MG/1
TABLET, FILM COATED ORAL
Qty: 180 TABLET | Refills: 3 | Status: SHIPPED | OUTPATIENT
Start: 2020-04-13 | End: 2020-09-09

## 2020-04-13 NOTE — PROGRESS NOTES
Digital Medicine: Health  Follow-Up    The history is provided by the patient. No  was used.     Follow Up  Follow-up reason(s): reading review      Readings are trending down due to lifestyle change.      INTERVENTION(S)  encouragement/support    There are no preventive care reminders to display for this patient.    Last 5 Patient Entered Readings                                      Current 30 Day Average: 122/71     Recent Readings 4/8/2020 4/5/2020 4/2/2020 3/26/2020 3/21/2020    SBP (mmHg) 103 134 128 112 116    DBP (mmHg) 64 74 67 68 66    Pulse 71 61 64 66 52            Diet Screening   No change to diet.      Physical Activity Screening   No change to exercise routine.        Medication Adherence Screening   She did not miss a dose this month.    No questions or concerns at this time.

## 2020-05-06 DIAGNOSIS — M35.9 UNDIFFERENTIATED CONNECTIVE TISSUE DISEASE: ICD-10-CM

## 2020-05-06 DIAGNOSIS — M35.00 SICCA SYNDROME: ICD-10-CM

## 2020-05-07 RX ORDER — PILOCARPINE HYDROCHLORIDE 5 MG/1
TABLET, FILM COATED ORAL
Qty: 180 TABLET | Refills: 2 | Status: SHIPPED | OUTPATIENT
Start: 2020-05-07 | End: 2021-02-16 | Stop reason: SDUPTHER

## 2020-05-29 ENCOUNTER — PATIENT OUTREACH (OUTPATIENT)
Dept: OTHER | Facility: OTHER | Age: 65
End: 2020-05-29

## 2020-05-29 NOTE — PROGRESS NOTES
Digital Medicine: Health  Follow-Up    The history is provided by the patient. No  was used.     Follow Up  Follow-up reason(s): reading review      Readings are missing.   patient reminder needed.  Readings are still within goal range. Average below goal at 128/71.    INTERVENTION(S)  encouragement/support    There are no preventive care reminders to display for this patient.    Last 5 Patient Entered Readings                                      Current 30 Day Average: 128/71     Recent Readings 5/20/2020 5/15/2020 5/7/2020 5/5/2020 5/1/2020    SBP (mmHg) 135 116 133 125 132    DBP (mmHg) 74 68 71 71 72    Pulse 56 51 63 60 58            Diet Screening   No change to diet.  She has the following dietary restrictions: low sodium diet    Physical Activity Screening   No change to exercise routine.    When asked if exercising, patient responded: yes    Patient participates in the following activities: walking and yoga/stretching    Medication Adherence Screening   She did not miss a dose this month.    No questions or concerns at this time.

## 2020-06-26 NOTE — PROGRESS NOTES
Digital Medicine: Clinician Follow-Up    Called patient to follow up. Patient endorses adherence to medication regimen. Patient denies hypotensive s/sx (lightheadedness, dizziness, nausea, fatigue); patient denies hypertensive s/sx (SOB, CP, severe headaches, changes in vision).     The history is provided by the patient. No  was used.   Follow Up  Follow-up reason(s): routine education      Assessment:  Reviewed recent readings. Per 2017 ACC/ AHA HTN guidelines (goal of BP < 130/80), current 30-day average is well controlled.     Last 5 Patient Entered Readings                                      Current 30 Day Average: 125/71     Recent Readings 6/26/2020 6/23/2020 6/14/2020 6/5/2020 5/29/2020    SBP (mmHg) 122 127 140 117 121    DBP (mmHg) 69 69 77 65 73    Pulse 61 66 56 66 62        INTERVENTION(S)  encouragement/support, goal setting and denied questions    PLAN  Health  follow up and continue monitoring    >Continue current medication regimen.   >I will continue to monitor regularly and will follow-up in 16 weeks, sooner if blood pressure begins to trend upward or downward.   >Patient denies having questions or concerns. Patient has my contact information and knows to call with any concerns or clinical changes.      There are no preventive care reminders to display for this patient.    Hypertension Medications             olmesartan-amLODIPin-hcthiazid 40-5-12.5 mg Tab

## 2020-07-17 ENCOUNTER — PATIENT OUTREACH (OUTPATIENT)
Dept: OTHER | Facility: OTHER | Age: 65
End: 2020-07-17

## 2020-07-17 NOTE — PROGRESS NOTES
Digital Medicine: Health  Follow-Up    The history is provided by the patient.         Reason for review: Blood pressure at goal            Diet-no change to diet    No change to diet.  Patient reports eating or drinking the following: Ms. Walton practices a low sodium diet.     Additional diet details:    Physical Activity-no change to routine  No change to exercise routine.       She physical activity details: Ms. Walton walks around her neighborhood and does home exercises.       Medication Adherence-Medication adherence was assessed.      Substance, Sleep, Stress-Not assessed    PLAN  Continue current diet/physical activity routine: low sodium diet & home exercises     Patient verbalizes understanding. Patient did not express questions or concerns and patient has contact information if needed.        There are no preventive care reminders to display for this patient.    Last 5 Patient Entered Readings                                      Current 30 Day Average: 121/69     Recent Readings 7/12/2020 7/6/2020 6/29/2020 6/26/2020 6/23/2020    SBP (mmHg) 117 123 117 122 127    DBP (mmHg) 71 72 66 69 69    Pulse 55 53 56 61 66

## 2020-07-23 ENCOUNTER — OFFICE VISIT (OUTPATIENT)
Dept: FAMILY MEDICINE | Facility: CLINIC | Age: 65
End: 2020-07-23
Payer: COMMERCIAL

## 2020-07-23 VITALS
TEMPERATURE: 98 F | DIASTOLIC BLOOD PRESSURE: 72 MMHG | WEIGHT: 167.31 LBS | HEIGHT: 64 IN | OXYGEN SATURATION: 98 % | BODY MASS INDEX: 28.56 KG/M2 | SYSTOLIC BLOOD PRESSURE: 118 MMHG | HEART RATE: 90 BPM

## 2020-07-23 DIAGNOSIS — I10 ESSENTIAL HYPERTENSION: Chronic | ICD-10-CM

## 2020-07-23 DIAGNOSIS — Z00.00 PREVENTATIVE HEALTH CARE: Primary | ICD-10-CM

## 2020-07-23 DIAGNOSIS — M35.9 UNDIFFERENTIATED CONNECTIVE TISSUE DISEASE: ICD-10-CM

## 2020-07-23 DIAGNOSIS — K51.30 ULCERATIVE RECTOSIGMOIDITIS WITHOUT COMPLICATION: ICD-10-CM

## 2020-07-23 DIAGNOSIS — Z11.4 ENCOUNTER FOR SCREENING FOR HIV: ICD-10-CM

## 2020-07-23 DIAGNOSIS — E78.5 HYPERLIPIDEMIA, UNSPECIFIED HYPERLIPIDEMIA TYPE: Chronic | ICD-10-CM

## 2020-07-23 DIAGNOSIS — Z12.31 ENCOUNTER FOR SCREENING MAMMOGRAM FOR MALIGNANT NEOPLASM OF BREAST: ICD-10-CM

## 2020-07-23 PROCEDURE — 99999 PR PBB SHADOW E&M-EST. PATIENT-LVL IV: ICD-10-PCS | Mod: PBBFAC,,, | Performed by: FAMILY MEDICINE

## 2020-07-23 PROCEDURE — 3008F PR BODY MASS INDEX (BMI) DOCUMENTED: ICD-10-PCS | Mod: CPTII,S$GLB,, | Performed by: FAMILY MEDICINE

## 2020-07-23 PROCEDURE — 3078F DIAST BP <80 MM HG: CPT | Mod: CPTII,S$GLB,, | Performed by: FAMILY MEDICINE

## 2020-07-23 PROCEDURE — 3074F SYST BP LT 130 MM HG: CPT | Mod: CPTII,S$GLB,, | Performed by: FAMILY MEDICINE

## 2020-07-23 PROCEDURE — 99396 PR PREVENTIVE VISIT,EST,40-64: ICD-10-PCS | Mod: S$GLB,,, | Performed by: FAMILY MEDICINE

## 2020-07-23 PROCEDURE — 3074F PR MOST RECENT SYSTOLIC BLOOD PRESSURE < 130 MM HG: ICD-10-PCS | Mod: CPTII,S$GLB,, | Performed by: FAMILY MEDICINE

## 2020-07-23 PROCEDURE — 3008F BODY MASS INDEX DOCD: CPT | Mod: CPTII,S$GLB,, | Performed by: FAMILY MEDICINE

## 2020-07-23 PROCEDURE — 99396 PREV VISIT EST AGE 40-64: CPT | Mod: S$GLB,,, | Performed by: FAMILY MEDICINE

## 2020-07-23 PROCEDURE — 99999 PR PBB SHADOW E&M-EST. PATIENT-LVL IV: CPT | Mod: PBBFAC,,, | Performed by: FAMILY MEDICINE

## 2020-07-23 PROCEDURE — 3078F PR MOST RECENT DIASTOLIC BLOOD PRESSURE < 80 MM HG: ICD-10-PCS | Mod: CPTII,S$GLB,, | Performed by: FAMILY MEDICINE

## 2020-07-23 RX ORDER — OLMESARTAN MEDOXOMIL / AMLODIPINE BESYLATE / HYDROCHLOROTHIAZIDE 40; 10; 12.5 MG/1; MG/1; MG/1
1 TABLET, FILM COATED ORAL DAILY
Qty: 30 TABLET | Refills: 11 | Status: SHIPPED | OUTPATIENT
Start: 2020-07-23 | End: 2021-08-16

## 2020-07-23 RX ORDER — IBUPROFEN 800 MG/1
TABLET ORAL
COMMUNITY
Start: 2020-06-14 | End: 2021-02-25

## 2020-07-23 RX ORDER — AMLODIPINE BESYLATE 5 MG/1
TABLET ORAL
COMMUNITY
Start: 2020-05-02 | End: 2020-07-23

## 2020-07-23 RX ORDER — HYDROCORTISONE 25 MG/ML
LOTION TOPICAL 2 TIMES DAILY
Qty: 1 BOTTLE | Refills: 5 | Status: SHIPPED | OUTPATIENT
Start: 2020-07-23

## 2020-07-23 NOTE — PROGRESS NOTES
CHIEF COMPLAINT:  This is a 65-year-old female here for preventive health exam.     SUBJECTIVE: Patient is doing well without complaints except for onset of facial rash on lower part of face which is pruritic in nature. Patient has hypertension for which she takes a combination of amlodipine 10 mg, olmesartan 40 mg and HCTZ 12.5 mg daily.  Blood pressure is controlled with today's reading 118/72.  She takes atorvastatin 10 mg daily for hyperlipidemia.  Patient is followed by a cardiologist.  Patient has sicca syndrome and undifferentiated connective tissue disease for which she takes hydroxychloroquine.  She has ulcerative colitis and is followed by GI.  She takes Colazal with control of her symptoms.  Patient had HPV type 16 and other high risk types on Pap in June 2019.  Colposcopy in June 2019 was negative.     Eye exam June 2020.   Pap smear June 2019.  Mammogram June 2019.  Colonoscopy June 2020.  Immunizations: Influenza vaccine September 2019.  Prevnar January 2017.  Tdap December 2017.       ROS:  GENERAL: Patient denies fever, chills, night sweats.  Patient denies weight gain or loss. Patient denies anorexia, fatigue, weakness or swollen glands.  SKIN: Patient denies rash or hair loss.  HEENT: Patient denies sore throat, ear pain, hearing loss, nasal congestion, or runny nose. Patient denies visual disturbance, eye irritation or discharge.  LUNGS: Patient denies cough, wheeze or hemoptysis.  CARDIOVASCULAR: Patient denies chest pain, shortness of breath, palpitations, syncope or lower extremity edema.  GI: Patient denies abdominal pain, nausea, vomiting, diarrhea, constipation, blood in stool or melena.  GENITOURINARY: Patient denies pelvic pain, vaginal discharge, itch or odor. Patient denies irregular vaginal bleeding.  Patient denies dysuria, frequency, hematuria, nocturia, urgency or incontinence.  BREASTS: Patient denies breast pain, mass or nipple discharge.  MUSCULOSKELETAL: Patient denies joint  pain, swelling, redness or warmth.  NEUROLOGIC: Patient denies headache, vertigo, paresthesias, weakness in limb, dysarthria, dysphagia or abnormality of gait.  PSYCHIATRIC: Patient denies anxiety, depression, or memory loss.     OBJECTIVE:   GENERAL: Well-developed well-nourished slightly overweight black female alert and oriented x3, in no acute distress.  Memory, judgment and cognition without deficit.  Weight gain of 8 pounds in the last year.  SKIN:  Fine maculopapular rash on chin and lower face.  Normal color and tone.  HEENT: Eyes: Clear conjunctivae. No scleral icterus.  Pupils equal reactive to light and accommodation.  Ears: Clear canals. Clear TMs.  Nose: Without congestion.  Pharynx: Without injection or exudates.  NECK: Supple, normal range of motion.  No masses, lymphadenopathy or enlarged thyroid.  No JVD.  Carotids 2+ and equal.  No bruits.  LUNGS: Clear to auscultation.  Normal respiratory effort.  CARDIOVASCULAR:  Regular rhythm, normal S1, S2 without murmur, gallop or rub.  BACK:  No CVA or spinal tenderness.  BREASTS:  No masses, tenderness or nipple discharge.  ABDOMEN: Normal appearance.  Active bowel sounds.  Soft, nontender without mass or organomegaly.  No rebound or guarding.  EXTREMITIES: Without cyanosis, clubbing or edema.  Distal pulses 2+ and equal.  Normal range of motion in all extremities.  No joint effusion, erythema or warmth.  Bilateral bunions.  NEUROLOGIC:   Cranial nerves II through XII without deficit.  Motor strength equal bilaterally.  Sensation normal to touch.  Deep tendon reflexes 2+ and equal.  Gait without abnormality.  No tremor.  Negative cerebellar signs.  PELVIC:  External: Without lesions or inflammation.  Vaginal: Clear, atrophic changes.  Cervix: Normal appearance, no motion tenderness. No Pap.  Uterus: Normal size and shape.  Adnexa: Non-tender, without masses.  Rectovaginal: Confirms, heme-negative stool x2.    ASSESSMENT:  1. Preventative health care    2.  Essential hypertension    3. Hyperlipidemia, unspecified hyperlipidemia type    4. Undifferentiated connective tissue disease    5. Ulcerative rectosigmoiditis without complication    6. Encounter for screening for HIV    7. Encounter for screening mammogram for malignant neoplasm of breast        PLAN:  1.  Weight reduction. Exercise regularly.  2.  Age-appropriate counseling.  3.  Fasting lab.  4.  Mammogram.  5.  Refill medication.  6.  Hydrocortisone 2.5% lotion twice daily to facial rash.  7.  Follow up in 6-12 months.    This note is generated with speech recognition software and is subject to transcription error and sound alike phrases that may be missed by proofreading.

## 2020-07-28 ENCOUNTER — HOSPITAL ENCOUNTER (OUTPATIENT)
Dept: RADIOLOGY | Facility: HOSPITAL | Age: 65
Discharge: HOME | End: 2020-07-28
Attending: FAMILY MEDICINE
Payer: COMMERCIAL

## 2020-07-28 VITALS — HEIGHT: 64 IN | BODY MASS INDEX: 28.51 KG/M2 | WEIGHT: 167 LBS

## 2020-07-28 DIAGNOSIS — Z12.31 ENCOUNTER FOR SCREENING MAMMOGRAM FOR MALIGNANT NEOPLASM OF BREAST: ICD-10-CM

## 2020-07-28 PROCEDURE — 77067 SCR MAMMO BI INCL CAD: CPT | Mod: 26,,, | Performed by: RADIOLOGY

## 2020-07-28 PROCEDURE — 77067 SCR MAMMO BI INCL CAD: CPT | Mod: TC

## 2020-07-28 PROCEDURE — 77063 MAMMO DIGITAL SCREENING BILAT WITH TOMOSYNTHESIS_CAD: ICD-10-PCS | Mod: 26,,, | Performed by: RADIOLOGY

## 2020-07-28 PROCEDURE — 77067 MAMMO DIGITAL SCREENING BILAT WITH TOMOSYNTHESIS_CAD: ICD-10-PCS | Mod: 26,,, | Performed by: RADIOLOGY

## 2020-07-28 PROCEDURE — 77063 BREAST TOMOSYNTHESIS BI: CPT | Mod: 26,,, | Performed by: RADIOLOGY

## 2020-07-29 LAB
ALBUMIN SERPL-MCNC: 4.5 G/DL (ref 3.8–4.8)
ALBUMIN/GLOB SERPL: 1.6 {RATIO} (ref 1.2–2.2)
ALP SERPL-CCNC: 66 IU/L (ref 39–117)
ALT SERPL-CCNC: 17 IU/L (ref 0–32)
APPEARANCE UR: CLEAR
AST SERPL-CCNC: 24 IU/L (ref 0–40)
BACTERIA #/AREA URNS HPF: NORMAL /[HPF]
BASOPHILS # BLD AUTO: 0 X10E3/UL (ref 0–0.2)
BASOPHILS NFR BLD AUTO: 1 %
BILIRUB SERPL-MCNC: 0.2 MG/DL (ref 0–1.2)
BILIRUB UR QL STRIP: NEGATIVE
BUN SERPL-MCNC: 10 MG/DL (ref 8–27)
BUN/CREAT SERPL: 10 (ref 12–28)
C3 SERPL-MCNC: 129 MG/DL (ref 82–167)
C4 SERPL-MCNC: 34 MG/DL (ref 14–44)
CALCIUM SERPL-MCNC: 9.5 MG/DL (ref 8.7–10.3)
CHLORIDE SERPL-SCNC: 102 MMOL/L (ref 96–106)
CHOLEST SERPL-MCNC: 167 MG/DL (ref 100–199)
CO2 SERPL-SCNC: 28 MMOL/L (ref 20–29)
COLOR UR: YELLOW
CREAT SERPL-MCNC: 0.98 MG/DL (ref 0.57–1)
CRP SERPL-MCNC: <1 MG/L (ref 0–10)
DSDNA AB SER-ACNC: <1 IU/ML (ref 0–9)
EOSINOPHIL # BLD AUTO: 0.1 X10E3/UL (ref 0–0.4)
EOSINOPHIL NFR BLD AUTO: 3 %
EPI CELLS #/AREA URNS HPF: NORMAL /HPF (ref 0–10)
ERYTHROCYTE [DISTWIDTH] IN BLOOD BY AUTOMATED COUNT: 11.8 % (ref 11.7–15.4)
ERYTHROCYTE [SEDIMENTATION RATE] IN BLOOD BY WESTERGREN METHOD: 4 MM/HR (ref 0–40)
GLOBULIN SER CALC-MCNC: 2.9 G/DL (ref 1.5–4.5)
GLUCOSE SERPL-MCNC: 93 MG/DL (ref 65–99)
GLUCOSE UR QL: NEGATIVE
HCT VFR BLD AUTO: 42.3 % (ref 34–46.6)
HDLC SERPL-MCNC: 53 MG/DL
HGB BLD-MCNC: 13.5 G/DL (ref 11.1–15.9)
HGB UR QL STRIP: ABNORMAL
HIV 1+2 AB+HIV1 P24 AG SERPL QL IA: NON REACTIVE
IGA SERPL-MCNC: 383 MG/DL (ref 87–352)
IGG SERPL-MCNC: 1624 MG/DL (ref 586–1602)
IGM SERPL-MCNC: 72 MG/DL (ref 26–217)
IMM GRANULOCYTES # BLD AUTO: 0 X10E3/UL (ref 0–0.1)
IMM GRANULOCYTES NFR BLD AUTO: 0 %
KETONES UR QL STRIP: NEGATIVE
LDLC SERPL CALC-MCNC: 101 MG/DL (ref 0–99)
LEUKOCYTE ESTERASE UR QL STRIP: ABNORMAL
LYMPHOCYTES # BLD AUTO: 1.6 X10E3/UL (ref 0.7–3.1)
LYMPHOCYTES NFR BLD AUTO: 39 %
MCH RBC QN AUTO: 30.1 PG (ref 26.6–33)
MCHC RBC AUTO-ENTMCNC: 31.9 G/DL (ref 31.5–35.7)
MCV RBC AUTO: 94 FL (ref 79–97)
MICRO URNS: ABNORMAL
MONOCYTES # BLD AUTO: 0.5 X10E3/UL (ref 0.1–0.9)
MONOCYTES NFR BLD AUTO: 12 %
MUCOUS THREADS URNS QL MICRO: PRESENT
NEUTROPHILS # BLD AUTO: 1.8 X10E3/UL (ref 1.4–7)
NEUTROPHILS NFR BLD AUTO: 45 %
NITRITE UR QL STRIP: NEGATIVE
PH UR STRIP: 6 [PH] (ref 5–7.5)
PLATELET # BLD AUTO: 241 X10E3/UL (ref 150–450)
POTASSIUM SERPL-SCNC: 4.1 MMOL/L (ref 3.5–5.2)
PROT SERPL-MCNC: 7.4 G/DL (ref 6–8.5)
PROT UR QL STRIP: NEGATIVE
RBC # BLD AUTO: 4.49 X10E6/UL (ref 3.77–5.28)
RBC #/AREA URNS HPF: NORMAL /HPF (ref 0–2)
SODIUM SERPL-SCNC: 143 MMOL/L (ref 134–144)
SP GR UR: 1.01 (ref 1–1.03)
TRIGL SERPL-MCNC: 65 MG/DL (ref 0–149)
TSH SERPL DL<=0.005 MIU/L-ACNC: 3.16 UIU/ML (ref 0.45–4.5)
UROBILINOGEN UR STRIP-MCNC: 0.2 MG/DL (ref 0.2–1)
VLDLC SERPL CALC-MCNC: 13 MG/DL (ref 5–40)
WBC # BLD AUTO: 4 X10E3/UL (ref 3.4–10.8)
WBC #/AREA URNS HPF: NORMAL /HPF (ref 0–5)

## 2020-08-25 ENCOUNTER — OFFICE VISIT (OUTPATIENT)
Dept: RHEUMATOLOGY | Facility: CLINIC | Age: 65
End: 2020-08-25
Payer: COMMERCIAL

## 2020-08-25 VITALS
HEART RATE: 60 BPM | WEIGHT: 164.44 LBS | HEIGHT: 64 IN | SYSTOLIC BLOOD PRESSURE: 123 MMHG | DIASTOLIC BLOOD PRESSURE: 69 MMHG | BODY MASS INDEX: 28.07 KG/M2

## 2020-08-25 DIAGNOSIS — M35.9 UNDIFFERENTIATED CONNECTIVE TISSUE DISEASE: Primary | ICD-10-CM

## 2020-08-25 DIAGNOSIS — M35.00 SICCA SYNDROME: ICD-10-CM

## 2020-08-25 DIAGNOSIS — R76.8 POSITIVE ANTI-CCP TEST: ICD-10-CM

## 2020-08-25 DIAGNOSIS — D89.2 HYPERGAMMAGLOBULINEMIA: ICD-10-CM

## 2020-08-25 DIAGNOSIS — K51.30 ULCERATIVE RECTOSIGMOIDITIS WITHOUT COMPLICATION: ICD-10-CM

## 2020-08-25 PROCEDURE — 3078F PR MOST RECENT DIASTOLIC BLOOD PRESSURE < 80 MM HG: ICD-10-PCS | Mod: CPTII,S$GLB,, | Performed by: PHYSICIAN ASSISTANT

## 2020-08-25 PROCEDURE — 99999 PR PBB SHADOW E&M-EST. PATIENT-LVL III: CPT | Mod: PBBFAC,,, | Performed by: PHYSICIAN ASSISTANT

## 2020-08-25 PROCEDURE — 3074F SYST BP LT 130 MM HG: CPT | Mod: CPTII,S$GLB,, | Performed by: PHYSICIAN ASSISTANT

## 2020-08-25 PROCEDURE — 99999 PR PBB SHADOW E&M-EST. PATIENT-LVL III: ICD-10-PCS | Mod: PBBFAC,,, | Performed by: PHYSICIAN ASSISTANT

## 2020-08-25 PROCEDURE — 99214 PR OFFICE/OUTPT VISIT, EST, LEVL IV, 30-39 MIN: ICD-10-PCS | Mod: S$GLB,,, | Performed by: PHYSICIAN ASSISTANT

## 2020-08-25 PROCEDURE — 99214 OFFICE O/P EST MOD 30 MIN: CPT | Mod: S$GLB,,, | Performed by: PHYSICIAN ASSISTANT

## 2020-08-25 PROCEDURE — 3008F BODY MASS INDEX DOCD: CPT | Mod: CPTII,S$GLB,, | Performed by: PHYSICIAN ASSISTANT

## 2020-08-25 PROCEDURE — 3008F PR BODY MASS INDEX (BMI) DOCUMENTED: ICD-10-PCS | Mod: CPTII,S$GLB,, | Performed by: PHYSICIAN ASSISTANT

## 2020-08-25 PROCEDURE — 3074F PR MOST RECENT SYSTOLIC BLOOD PRESSURE < 130 MM HG: ICD-10-PCS | Mod: CPTII,S$GLB,, | Performed by: PHYSICIAN ASSISTANT

## 2020-08-25 PROCEDURE — 3078F DIAST BP <80 MM HG: CPT | Mod: CPTII,S$GLB,, | Performed by: PHYSICIAN ASSISTANT

## 2020-08-25 NOTE — PROGRESS NOTES
Subjective:       Patient ID: Juanita Walton is a 65 y.o. female.    Chief Complaint: UCTD, Sicca, and + anti-CCP    Juanita back today for rheumatology followup.      She has chronic undifferentiated connective tissue disease with a positive ARMAAN, negative autoantibody profile, positive rheumatoid factor (500) , positive CCP(94).  Family history rheumatoid arthritis.  She has + Mild dry mouth and dry eyes stable on salagen orally 1-2 times a day.  Over-the-counter biotene products for mouth and otc rewetting drops for her eyes.    Last armaan 2/2019- neg. Has been doing well. > 10 yrs plaq. Was initially on 400 mg/d (5.4 m g/kg)- last year we cut dose back but she is still taking twice daily forgot to cut back, we want to minimized long term eye risk, she is not  taking  plaquenil 200 mg once daily (2.77 mg/kg X 14 yrs.)   regular eye checks with no plaquenil toxicity issues. Last done jan 2020, all clear     She has not developed any signs of inflammatory arthritis, no significant morning stiffness. She denies mouth ulcers, no Raynaud's, no rashes, no chest pain, no pleuritic chest pain, no fevers, no weight loss, no parotitis   She dose have Ulcerative colitis which has been stable on colozal.       We did [facility] hand and foot X-ray 2016  left 1st mtp hallux valgus and cmc OA both hands. No erosions or signs of RA. on flaxseed oil 4000 mg daily,  Daily MV, and glucosamine/chondrontin.  Left 5th dip tenderness- last x-ray showed mild djd, no issues recently, some left thumb triggering but now better.  no other msk issues.     Some lower back pain comes and goes with certain activities  Bending is the worst; no radicular pain, no numbness, tingling, weakness in her extremities  She did get otc topical rubs which have helped    Pain today 0/10         Review of Systems   Constitutional: Negative.  Negative for activity change, appetite change, chills, fatigue and fever.   HENT: Negative.  Negative for mouth sores and  "trouble swallowing.         No dry mouth   Eyes: Negative.  Negative for photophobia, pain and redness.        No swollen or red eyes, no dry eye     Respiratory: Negative.  Negative for chest tightness, shortness of breath, wheezing and stridor.    Cardiovascular: Negative.  Negative for chest pain.   Gastrointestinal: Negative.  Negative for abdominal pain, blood in stool, diarrhea, nausea and vomiting.   Genitourinary: Negative.  Negative for dysuria, frequency, hematuria and urgency.   Musculoskeletal: Negative for arthralgias, back pain, gait problem, joint swelling, myalgias, neck pain and neck stiffness.   Skin: Negative.  Negative for color change, pallor and rash.   Neurological: Negative.  Negative for weakness.   Hematological: Negative for adenopathy.   Psychiatric/Behavioral: Negative for suicidal ideas.         Objective:     Past Medical History:   Diagnosis Date    Anemia     Essential hypertension     History of HPV infection     Hyperlipidemia     Overweight (BMI 25.0-29.9)     Sicca syndrome     Ulcerative colitis     Undifferentiated connective tissue disease       Immunization History   Administered Date(s) Administered    Influenza 12/12/2006, 10/24/2017, 11/10/2018    Influenza - Quadrivalent 11/10/2014, 01/04/2016, 10/25/2016    Influenza - Quadrivalent - PF *Preferred* (6 months and older) 09/25/2019    Influenza Split 12/09/2005, 11/17/2008, 11/18/2011, 12/19/2012, 11/10/2013    Pneumococcal Conjugate - 13 Valent 01/19/2017    Tdap 12/14/2017       /69   Pulse 60   Ht 5' 4" (1.626 m)   Wt 74.6 kg (164 lb 7.4 oz)   BMI 28.23 kg/m²      Physical Exam   Constitutional: She is oriented to person, place, and time and well-developed, well-nourished, and in no distress. No distress.   HENT:   Head: Normocephalic and atraumatic.   Right Ear: External ear normal.   Left Ear: External ear normal.   Mouth/Throat: Mucous membranes are dry. No oropharyngeal exudate.       Eyes: " EOM are normal. Pupils are equal, round, and reactive to light. Right conjunctiva is not injected. Right conjunctiva has no hemorrhage. Left conjunctiva is not injected. Left conjunctiva has no hemorrhage. No scleral icterus.       Neck: Normal range of motion. Neck supple. No thyromegaly present.   Cardiovascular: Normal rate, regular rhythm and normal heart sounds.    No murmur heard.  Pulmonary/Chest: Effort normal and breath sounds normal. She exhibits no tenderness.   Abdominal: Soft. Bowel sounds are normal.   Lymphadenopathy:     She has no cervical adenopathy.   Neurological: She is alert and oriented to person, place, and time. She displays normal reflexes. No cranial nerve deficit. She exhibits normal muscle tone. Gait normal.   Skin: Skin is warm and dry. No rash noted.     Musculoskeletal: Normal range of motion. Deformity present. No tenderness or edema.      Comments: Some heberden nodes left 5th dip  No triggering today   No synovitis   No rash, no calcinosis  No Raynaud's   Neg slr  No muscle spasm noted today            2/2019- [facility] x-[facility]     FINDINGS:  Similar mild osteoarthritic changes present without acute osseous abnormality.  No concerning erosion or focal soft tissue abnormality.        Recent Results (from the past 1008 hour(s))   Lipid Panel    Collection Time: 07/28/20  8:29 AM   Result Value Ref Range    Cholesterol 167 100 - 199 mg/dL    Triglycerides 65 0 - 149 mg/dL    HDL 53 >39 mg/dL    VLDL Cholesterol Tuan 13 5 - 40 mg/dL    LDL Calculated 101 (H) 0 - 99 mg/dL   TSH    Collection Time: 07/28/20  8:29 AM   Result Value Ref Range    TSH 3.160 0.450 - 4.500 uIU/mL   HIV 1/2 Ag/Ab (4th Gen)    Collection Time: 07/28/20  8:29 AM   Result Value Ref Range    HIV Screen 4th Generation wRfx Non Reactive Non Reactive   Sedimentation rate, automated    Collection Time: 07/28/20  8:31 AM   Result Value Ref Range    Sed Rate 4 0 - 40 mm/hr   CBC auto differential    Collection Time:  07/28/20  8:31 AM   Result Value Ref Range    WBC 4.0 3.4 - 10.8 x10E3/uL    RBC 4.49 3.77 - 5.28 x10E6/uL    Hemoglobin 13.5 11.1 - 15.9 g/dL    Hematocrit 42.3 34.0 - 46.6 %    Mean Corpuscular Volume 94 79 - 97 fL    Mean Corpuscular Hemoglobin 30.1 26.6 - 33.0 pg    Mean Corpuscular Hemoglobin Conc 31.9 31.5 - 35.7 g/dL    RDW 11.8 11.7 - 15.4 %    Platelets 241 150 - 450 x10E3/uL    Neutrophils 45 Not Estab. %    Lymph% 39 Not Estab. %    Mono% 12 Not Estab. %    Eosinophil% 3 Not Estab. %    Basophil% 1 Not Estab. %    Neutrophils Absolute 1.8 1.4 - 7.0 x10E3/uL    Lymph # 1.6 0.7 - 3.1 x10E3/uL    Mono # 0.5 0.1 - 0.9 x10E3/uL    Eos # 0.1 0.0 - 0.4 x10E3/uL    Baso # 0.0 0.0 - 0.2 x10E3/uL    Immature Granulocytes 0 Not Estab. %    Immature Grans (Abs) 0.0 0.0 - 0.1 x10E3/uL   Comprehensive metabolic panel    Collection Time: 07/28/20  8:31 AM   Result Value Ref Range    Glucose 93 65 - 99 mg/dL    BUN, Bld 10 8 - 27 mg/dL    Creatinine 0.98 0.57 - 1.00 mg/dL    eGFR if non African American 61 >59 mL/min/1.73    eGFR if African American 71 >59 mL/min/1.73    BUN/Creatinine Ratio 10 (L) 12 - 28    Sodium 143 134 - 144 mmol/L    Potassium 4.1 3.5 - 5.2 mmol/L    Chloride 102 96 - 106 mmol/L    CO2 28 20 - 29 mmol/L    Calcium 9.5 8.7 - 10.3 mg/dL    Total Protein 7.4 6.0 - 8.5 g/dL    Albumin 4.5 3.8 - 4.8 g/dL    Globulin, Total 2.9 1.5 - 4.5 g/dL    Albumin/Globulin Ratio 1.6 1.2 - 2.2    Total Bilirubin 0.2 0.0 - 1.2 mg/dL    Alkaline Phosphatase 66 39 - 117 IU/L    AST 24 0 - 40 IU/L    ALT 17 0 - 32 IU/L   Urinalysis    Collection Time: 07/28/20  8:31 AM   Result Value Ref Range    Specific Gravity, UA 1.009 1.005 - 1.030    pH, UA 6.0 5.0 - 7.5    Color, UA Yellow Yellow    Clarity, UA Clear Clear    Leukocytes, UA Trace (A) Negative    Protein, UA Negative Negative/Trace    Glucose, UA Negative Negative    Ketones, UA Negative Negative    Occult Blood UA Trace (A) Negative    Bilirubin, UA Negative  Negative    Urobilinogen, UA 0.2 0.2 - 1.0 mg/dL    Nitrite, UA Negative Negative    Microscopic Examination See below:    Microscopic Examination    Collection Time: 07/28/20  8:31 AM   Result Value Ref Range    WBC, UA 0-5 0 - 5 /hpf    RBC, UA 0-2 0 - 2 /hpf    Epithelial Cells (non renal) 0-10 0 - 10 /hpf    Mucus, UA Present Not Estab.    Bacteria, UA Few None seen/Few   Immunoglobulins (IgG, IgA, IgM) Quantitative    Collection Time: 07/28/20  8:31 AM   Result Value Ref Range    Immunoglobulin G 1,624 (H) 586 - 1,602 mg/dL    Immunoglobulin A (IgA) 383 (H) 87 - 352 mg/dL    Immunoglobulin M 72 26 - 217 mg/dL   C4 complement    Collection Time: 07/28/20  8:31 AM   Result Value Ref Range    C4 Complement 34 14 - 44 mg/dL   C3 complement    Collection Time: 07/28/20  8:31 AM   Result Value Ref Range    C3 Complement 129 82 - 167 mg/dL   Anti-DNA antibody, double-stranded    Collection Time: 07/28/20  8:31 AM   Result Value Ref Range    ds DNA Ab <1 0 - 9 IU/mL   C-Reactive Protein    Collection Time: 07/28/20  8:31 AM   Result Value Ref Range    CRP <1 0 - 10 mg/L        Ref. Range 2/15/2019 08:17 2/18/2020 08:01 7/28/2020 08:31   Immunoglobulin G Latest Ref Range: 586 - 1,602 mg/dL 1,873 (H)  1,624 (H)   Immunoglobulin M Latest Ref Range: 26 - 217 mg/dL 89  72             No results found for: TBGOLDPLUS  Lab Results   Component Value Date    HEPAIGM Negative 04/28/2004    HEPBIGM Negative 04/28/2004    HEPCAB Negative 04/28/2004 2/27/19 [facility] x-[facility] FINDINGS:  Similar mild osteoarthritic changes present without acute osseous abnormality.  No concerning erosion or focal soft tissue abnormality.    Assessment:       1. Undifferentiated connective tissue disease    2. Sicca syndrome    3. Hypergammaglobulinemia    4. 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 Undiff CTD with  + ELIEL, neg profile, + RF, + CCP with mouth and eye dryness but no signs of RA,  myositis, lupus, myositis currently this most likely is Sjogren's, watching for possible overlap with RA     Stable on plaquenil Q day  2.77 mg/kg X 14 yrs  No retinal toxicity, yearly eye checks     2. Ulcerative Colitis on Colozal per GI- stable     3. Hypergammaglobulinemia   Elevated esr with prior elevated IgA and IGG, no monoclonal spikes on immunofixation, slightly elevated gamma on spep - repeat normal - will follow - spep and immunofixation  Esr and crp now wnl  Repeat IGG 1873, - has trended upward over time - will repeat next visit     4. Lumbar myofascial/soft tissue pain w/bending   No myelopathy or radiculopathy    Plan:           No orders of the defined types were placed in this encounter.    C/w   plaquenil once daily,  yearly eye checks    C/w salagen 1-2 daily, can go up to tid if needed  C/w otc products for dryness  C/w gluc/chondrontin, flaxseed and daily MV    Gi for her UC, stable on colozal     Follow her immunoglobulin, spep and immunofixation yearly    X-ray [facility] hands utd 2/2019 repeat in 1 yr  - gianni with + ccp- nodule/bone spur left 5th dip joint    Info on lumbar pain causes, back care, home exercises for lbp  C/w topical otc rubs  Epsom salt warm soaks   X-ray and pt if issue worsen     rtc 6 mon with labs done at [facility] 1 wk prior   Labs prior to next visit - follow immunoglobulin, cbc, cmp, esr, crp, c3, c4, ds-dna, ua      Please call or send portal message with any questions or concerns

## 2020-08-25 NOTE — PATIENT INSTRUCTIONS
Causes of Lumbar (Low Back) Pain  Low back pain can be caused by problems with any part of the lumbar spine. A disk can herniate (push out) and press on a nerve. Vertebrae can rub against each other or slip out of place. This can irritate facet joints and nerves. It can also lead to stenosis, a narrowing of the spinal canal or foramen.  Pressure from a disk  Constant wear and tear on a disk can cause it to weaken and push outward. Part of the disk may then press on nearby nerves. There are two common types of herniated disks:  Contained means the soft nucleus is protruding outward.   Extruded means the firm annulus has torn, letting the soft center squeeze through.     Pressure from bone  An unstable spine   With age, a disk may thin and wear out. Vertebrae above and below the disk may begin to touch. This can put pressure on nerves. It can also cause bone spurs (growths) to form where the bones rub together.    Stenosis results when bone spurs narrow the foramen or spinal canal. This also puts pressure on nerves. Slipping vertebrae can irritate nerves and joints. They can also worsen stenosis.    In some cases, vertebrae become unstable and slip forward. This is called spondylolisthesis.     Date Last Reviewed: 10/12/2015  © 1633-0423 [employer]. [address] [place] [address], [place] [address]. All rights reserved. This information is not intended as a substitute for professional medical care. Always follow your healthcare professional's instructions.        Back Care Tips    Caring for your back  These are things you can do to prevent a recurrence of acute back pain and to reduce symptoms from chronic back pain:  · Maintain a healthy weight. If you are overweight, losing weight will help most types of back pain.  · Exercise is an important part of recovery from most types of back pain. The muscles behind and in front of the spine support the back. This means strengthening both the back muscles and the  abdominal muscles will provide better support for your spine.   · Swimming and brisk walking are good overall exercises to improve your fitness level.  · Practice safe lifting methods (below).  · Practice good posture when sitting, standing and walking. Avoid prolonged sitting. This puts more stress on the lower back than standing or walking.  · Wear quality shoes with sufficient arch support. Foot and ankle alignment can affect back symptoms. Women should avoid wearing high heels.  · Therapeutic massage can help relax the back muscles without stretching them.  · During the first 24 to 72 hours after an acute injury or flare-up of chronic back pain, apply an ice pack to the painful area for 20 minutes and then remove it for 20 minutes, over a period of 60 to 90 minutes, or several times a day. As a safety precaution, do not use a heating pad at bedtime. Sleeping on a heating pad can lead to skin burns or tissue damage.  · You can alternate ice and heat therapies.  Medications  Talk to your healthcare provider before using medicines, especially if you have other medical problems or are taking other medicines.  · You may use acetaminophen or ibuprofen to control pain, unless your healthcare provider prescribed other pain medicine. If you have chronic conditions like diabetes, liver or kidney disease, stomach ulcers, or gastrointestinal bleeding, or are taking blood thinners, talk with your healthcare provider before taking any medicines.  · Be careful if you are given prescription pain medicines, narcotics, or medicine for muscle spasm. They can cause drowsiness, affect your coordination, reflexes, and judgment. Do not drive or operate heavy machinery while taking these types of medicines. Take prescription pain medicine only as prescribed by your healthcare provider.  Lumbar stretch  Here is a simple stretching exercise that will help relax muscle spasm and keep your back more limber. If exercise makes your back pain  worse, dont do it.  · Lie on your back with your knees bent and both feet on the ground.  · Slowly raise your left knee to your chest as you flatten your lower back against the floor. Hold for 5 seconds.  · Relax and repeat the exercise with your right knee.  · Do 10 of these exercises for each leg.  Safe lifting method  · Dont bend over at the waist to lift an object off the floor.  Instead, bend your knees and hips in a squat.   · Keep your back and head upright  · Hold the object close to your body, directly in front of you.  · Straighten your legs to lift the object.   · Lower the object to the floor in the reverse fashion.  · If you must slide something across the floor, push it.  Posture tips  Sitting  Sit in chairs with straight backs or low-back support. Keep your knees lower than your hips, with your feet flat on the floor.  When driving, sit up straight. Adjust the seat forward so you are not leaning toward the steering wheel.  A small pillow or rolled towel behind your lower back may help if you are driving long distances.   Standing  When standing for long periods, shift most of your weight to one leg at a time. Alternate legs every few minutes.   Sleeping  The best way to sleep is on your side with your knees bent. Put a low pillow under your head to support your neck in a neutral spine position. Avoid thick pillows that bend your neck to one side. Put a pillow between your legs to further relax your lower back. If you sleep on your back, put pillows under your knees to support your legs in a slightly flexed position. Use a firm mattress. If your mattress sags, replace it, or use a 1/2-inch plywood board under the mattress to add support.  Follow-up care  Follow up with your healthcare provider, or as advised.  If X-rays, a CT scan or an MRI scan were taken, they will be reviewed by a radiologist. You will be notified of any new findings that may affect your care.  Call 911  Seek emergency medical  care if any of the following occur:  · Trouble breathing  · Confusion  · Very drowsy  · Fainting or loss of consciousness  · Rapid or very slow heart rate  · Loss of  bowel or bladder control  When to seek medical care  Call your healthcare provider if any of the following occur:  · Pain becomes worse or spreads to your arms or legs  · Weakness or numbness in one or both arms or legs  · Numbness in the groin area  Date Last Reviewed: 6/1/2016 © 2000-2017 [employer]. [address] [place] [address]. All rights reserved. This information is not intended as a substitute for professional medical care. Always follow your healthcare professional's instructions.      Sites to access for at home exercises for lower back pain     [email or web address]    [email or web address]-back-[email or web address]-[email or web address]  [email or web address]/ten-important-exercises-[email or web address]-back-and-hip-pain/    https://www.youtube.com/watch?v=_lT5Cd4cDTc

## 2020-09-18 ENCOUNTER — PATIENT OUTREACH (OUTPATIENT)
Dept: OTHER | Facility: OTHER | Age: 65
End: 2020-09-18

## 2020-09-18 NOTE — PROGRESS NOTES
Digital Medicine: Health  Follow-Up    The history is provided by the patient.             Reason for review: Blood pressure at goal        Topics Covered on Call: physical activity and Diet    Additional Follow-up details: Feeling well. Average below goal at 125/74.          Diet-no change to diet  No 24 hour dietary recall  No change to diet.  Patient reports eating or drinking the following: Ms. Walton practices a low sodium diet. Praise provided for maintaining.       Physical Activity-no change to routine  No change to exercise routine.       Additional physical activity details: Ms. Walton goes on walks and does home exercises. Praise provided for maintaining physical activity.       Medication Adherence-Medication adherence was assessed.          Continue current diet/physical activity routine.       Addressed patient questions and patient has my contact information if needed prior to next outreach. Patient verbalizes understanding.          There are no preventive care reminders to display for this patient.    Last 5 Patient Entered Readings                                      Current 30 Day Average: 125/74     Recent Readings 9/11/2020 9/1/2020 8/25/2020 8/24/2020 8/11/2020    SBP (mmHg) 134 119 119 127 123    DBP (mmHg) 75 72 69 78 76    Pulse 64 60 61 67 61

## 2020-09-23 ENCOUNTER — IMMUNIZATION (OUTPATIENT)
Dept: INTERNAL MEDICINE | Facility: CLINIC | Age: 65
End: 2020-09-23
Payer: COMMERCIAL

## 2020-09-23 PROCEDURE — 90471 IMMUNIZATION ADMIN: CPT | Mod: S$GLB,,, | Performed by: FAMILY MEDICINE

## 2020-09-23 PROCEDURE — 90471 FLU VACCINE (QUAD) GREATER THAN OR EQUAL TO 3YO PRESERVATIVE FREE IM: ICD-10-PCS | Mod: S$GLB,,, | Performed by: FAMILY MEDICINE

## 2020-09-23 PROCEDURE — 90686 IIV4 VACC NO PRSV 0.5 ML IM: CPT | Mod: S$GLB,,, | Performed by: FAMILY MEDICINE

## 2020-09-23 PROCEDURE — 90686 FLU VACCINE (QUAD) GREATER THAN OR EQUAL TO 3YO PRESERVATIVE FREE IM: ICD-10-PCS | Mod: S$GLB,,, | Performed by: FAMILY MEDICINE

## 2020-11-20 ENCOUNTER — PATIENT OUTREACH (OUTPATIENT)
Dept: OTHER | Facility: OTHER | Age: 65
End: 2020-11-20

## 2020-12-10 ENCOUNTER — OFFICE VISIT (OUTPATIENT)
Dept: OPHTHALMOLOGY | Facility: CLINIC | Age: 65
End: 2020-12-10
Payer: COMMERCIAL

## 2020-12-10 DIAGNOSIS — H52.4 BILATERAL PRESBYOPIA: ICD-10-CM

## 2020-12-10 DIAGNOSIS — I10 ESSENTIAL HYPERTENSION: ICD-10-CM

## 2020-12-10 DIAGNOSIS — Z79.899 LONG-TERM USE OF PLAQUENIL: ICD-10-CM

## 2020-12-10 DIAGNOSIS — H52.13 MYOPIA, BILATERAL: ICD-10-CM

## 2020-12-10 DIAGNOSIS — M35.9 UNDIFFERENTIATED CONNECTIVE TISSUE DISEASE: Primary | ICD-10-CM

## 2020-12-10 PROCEDURE — 92014 COMPRE OPH EXAM EST PT 1/>: CPT | Mod: S$GLB,,, | Performed by: OPTOMETRIST

## 2020-12-10 PROCEDURE — 99999 PR PBB SHADOW E&M-EST. PATIENT-LVL II: CPT | Mod: PBBFAC,,, | Performed by: OPTOMETRIST

## 2020-12-10 PROCEDURE — 92015 PR REFRACTION: ICD-10-PCS | Mod: S$GLB,,, | Performed by: OPTOMETRIST

## 2020-12-10 PROCEDURE — 92014 PR EYE EXAM, EST PATIENT,COMPREHESV: ICD-10-PCS | Mod: S$GLB,,, | Performed by: OPTOMETRIST

## 2020-12-10 PROCEDURE — 92134 CPTRZ OPH DX IMG PST SGM RTA: CPT | Mod: S$GLB,,, | Performed by: OPTOMETRIST

## 2020-12-10 PROCEDURE — 92083 HUMPHREY VISUAL FIELD - OU - BOTH EYES: ICD-10-PCS | Mod: S$GLB,,, | Performed by: OPTOMETRIST

## 2020-12-10 PROCEDURE — 99999 PR PBB SHADOW E&M-EST. PATIENT-LVL II: ICD-10-PCS | Mod: PBBFAC,,, | Performed by: OPTOMETRIST

## 2020-12-10 PROCEDURE — 92015 DETERMINE REFRACTIVE STATE: CPT | Mod: S$GLB,,, | Performed by: OPTOMETRIST

## 2020-12-10 PROCEDURE — 92083 EXTENDED VISUAL FIELD XM: CPT | Mod: S$GLB,,, | Performed by: OPTOMETRIST

## 2020-12-10 PROCEDURE — 92134 POSTERIOR SEGMENT OCT RETINA (OCULAR COHERENCE TOMOGRAPHY)-BOTH EYES: ICD-10-PCS | Mod: S$GLB,,, | Performed by: OPTOMETRIST

## 2020-12-10 NOTE — PROGRESS NOTES
HPI     No visual complaints  Review 10-2 and MOCT  Plaquenil check.  Last eye exam 12/14/2019 TRF.  Update glasses RX.  HX of undifferentiated connective tissus disease.    Last edited by Hanna Alvarado on 12/10/2020 10:34 AM. (History)        ROS     Negative for: Eyes    Last edited by Johnathan Mcdaniel, OD on 12/10/2020 11:21 AM. (History)        Assessment /Plan     For exam results, see Encounter Report.    Undifferentiated connective tissue disease    Long-term use of Plaquenil    Essential hypertension    Myopia, bilateral    Bilateral presbyopia      No active anterior or posterior uveitis OU.  No ocular changes from Plaquenil use    No HTN Retinopathy    Dispense Final Rx for glasses.  RTC 1 year  Discussed above and answered questions.

## 2020-12-11 NOTE — PROGRESS NOTES
Digital Medicine: Health  Follow-Up    The history is provided by the patient.             Reason for review: Blood pressure at goal      Additional Follow-up details: Feeling well overall. Average BP below goal at 128/69.  Nuclear stress test with cardiologist earlier this week. Waiting on results.             Diet-no change to diet    No change to diet.        Physical Activity-no change to routine  No change to exercise routine.     Medication Adherence-Medication Adherence not addressed.      Substance, Sleep, Stress-Not assessed      Continue current diet/physical activity routine.       Addressed patient questions and patient has my contact information if needed prior to next outreach.        There are no preventive care reminders to display for this patient.      Last 5 Patient Entered Readings                                      Current 30 Day Average: 128/69     Recent Readings 12/3/2020 11/19/2020 11/13/2020 10/29/2020 10/9/2020    SBP (mmHg) 121 126 138 120 123    DBP (mmHg) 69 63 75 70 70    Pulse 70 61 60 76 55

## 2021-01-26 ENCOUNTER — OFFICE VISIT (OUTPATIENT)
Dept: FAMILY MEDICINE | Facility: CLINIC | Age: 66
End: 2021-01-26
Payer: COMMERCIAL

## 2021-01-26 VITALS — HEIGHT: 64 IN | WEIGHT: 164 LBS | OXYGEN SATURATION: 98 % | BODY MASS INDEX: 28 KG/M2

## 2021-01-26 DIAGNOSIS — U07.1 COVID-19 VIRUS INFECTION: Primary | ICD-10-CM

## 2021-01-26 PROCEDURE — 3008F PR BODY MASS INDEX (BMI) DOCUMENTED: ICD-10-PCS | Mod: CPTII,,, | Performed by: FAMILY MEDICINE

## 2021-01-26 PROCEDURE — 99213 PR OFFICE/OUTPT VISIT, EST, LEVL III, 20-29 MIN: ICD-10-PCS | Mod: 95,,, | Performed by: FAMILY MEDICINE

## 2021-01-26 PROCEDURE — 3008F BODY MASS INDEX DOCD: CPT | Mod: CPTII,,, | Performed by: FAMILY MEDICINE

## 2021-01-26 PROCEDURE — 99213 OFFICE O/P EST LOW 20 MIN: CPT | Mod: 95,,, | Performed by: FAMILY MEDICINE

## 2021-02-02 ENCOUNTER — PATIENT MESSAGE (OUTPATIENT)
Dept: FAMILY MEDICINE | Facility: CLINIC | Age: 66
End: 2021-02-02

## 2021-02-24 LAB
ALBUMIN SERPL-MCNC: 4.2 G/DL (ref 3.8–4.8)
ALBUMIN/GLOB SERPL: 1.2 {RATIO} (ref 1.2–2.2)
ALP SERPL-CCNC: 77 IU/L (ref 39–117)
ALT SERPL-CCNC: 18 IU/L (ref 0–32)
ANA SPECKLED TITR SER: ABNORMAL {TITER}
ANA TITR SER IF: POSITIVE {TITER}
APPEARANCE UR: CLEAR
AST SERPL-CCNC: 23 IU/L (ref 0–40)
BACTERIA #/AREA URNS HPF: NORMAL /[HPF]
BASOPHILS # BLD AUTO: 0 X10E3/UL (ref 0–0.2)
BASOPHILS NFR BLD AUTO: 1 %
BILIRUB SERPL-MCNC: 0.3 MG/DL (ref 0–1.2)
BILIRUB UR QL STRIP: NEGATIVE
BUN SERPL-MCNC: 12 MG/DL (ref 8–27)
BUN/CREAT SERPL: 12 (ref 12–28)
C3 SERPL-MCNC: 145 MG/DL (ref 82–167)
C4 SERPL-MCNC: 35 MG/DL (ref 14–44)
CALCIUM SERPL-MCNC: 9.3 MG/DL (ref 8.7–10.3)
CENTROMERE B AB SER-ACNC: <0.2 AI (ref 0–0.9)
CHLORIDE SERPL-SCNC: 104 MMOL/L (ref 96–106)
CHROMATIN AB SERPL-ACNC: <0.2 AI (ref 0–0.9)
CO2 SERPL-SCNC: 26 MMOL/L (ref 20–29)
COLOR UR: YELLOW
CREAT SERPL-MCNC: 1 MG/DL (ref 0.57–1)
CRP SERPL-MCNC: 2 MG/L (ref 0–10)
DSDNA AB SER-ACNC: <1 IU/ML (ref 0–9)
ENA JO1 AB SER-ACNC: <0.2 AI (ref 0–0.9)
ENA RNP AB SER-ACNC: 0.4 AI (ref 0–0.9)
ENA SCL70 AB SER-ACNC: <0.2 AI (ref 0–0.9)
ENA SM AB SER-ACNC: <0.2 AI (ref 0–0.9)
ENA SS-A AB SER-ACNC: <0.2 AI (ref 0–0.9)
ENA SS-B AB SER-ACNC: <0.2 AI (ref 0–0.9)
EOSINOPHIL # BLD AUTO: 0.1 X10E3/UL (ref 0–0.4)
EOSINOPHIL NFR BLD AUTO: 2 %
EPI CELLS #/AREA URNS HPF: NORMAL /HPF (ref 0–10)
ERYTHROCYTE [DISTWIDTH] IN BLOOD BY AUTOMATED COUNT: 12.5 % (ref 11.7–15.4)
ERYTHROCYTE [SEDIMENTATION RATE] IN BLOOD BY WESTERGREN METHOD: 33 MM/HR (ref 0–40)
GLOBULIN SER CALC-MCNC: 3.4 G/DL (ref 1.5–4.5)
GLUCOSE SERPL-MCNC: 88 MG/DL (ref 65–99)
GLUCOSE UR QL: NEGATIVE
HCT VFR BLD AUTO: 37.8 % (ref 34–46.6)
HGB BLD-MCNC: 12.7 G/DL (ref 11.1–15.9)
HGB UR QL STRIP: NEGATIVE
IGA SERPL-MCNC: 413 MG/DL (ref 87–352)
IGG SERPL-MCNC: 1592 MG/DL (ref 603–1613)
IGM SERPL-MCNC: 86 MG/DL (ref 26–217)
IMM GRANULOCYTES # BLD AUTO: 0 X10E3/UL (ref 0–0.1)
IMM GRANULOCYTES NFR BLD AUTO: 0 %
KETONES UR QL STRIP: NEGATIVE
LABORATORY COMMENT REPORT: ABNORMAL
LEUKOCYTE ESTERASE UR QL STRIP: ABNORMAL
LYMPHOCYTES # BLD AUTO: 1.5 X10E3/UL (ref 0.7–3.1)
LYMPHOCYTES NFR BLD AUTO: 38 %
Lab: ABNORMAL
MCH RBC QN AUTO: 31.1 PG (ref 26.6–33)
MCHC RBC AUTO-ENTMCNC: 33.6 G/DL (ref 31.5–35.7)
MCV RBC AUTO: 92 FL (ref 79–97)
MICRO URNS: ABNORMAL
MONOCYTES # BLD AUTO: 0.4 X10E3/UL (ref 0.1–0.9)
MONOCYTES NFR BLD AUTO: 11 %
MUCOUS THREADS URNS QL MICRO: PRESENT
NEUTROPHILS # BLD AUTO: 1.9 X10E3/UL (ref 1.4–7)
NEUTROPHILS NFR BLD AUTO: 48 %
NITRITE UR QL STRIP: NEGATIVE
NOTE: ABNORMAL
PH UR STRIP: 6.5 [PH] (ref 5–7.5)
PLATELET # BLD AUTO: 238 X10E3/UL (ref 150–450)
POTASSIUM SERPL-SCNC: 4 MMOL/L (ref 3.5–5.2)
PROT SERPL-MCNC: 7.6 G/DL (ref 6–8.5)
PROT UR QL STRIP: NEGATIVE
RBC # BLD AUTO: 4.09 X10E6/UL (ref 3.77–5.28)
RBC #/AREA URNS HPF: NORMAL /HPF (ref 0–2)
SODIUM SERPL-SCNC: 142 MMOL/L (ref 134–144)
SP GR UR: 1.02 (ref 1–1.03)
UROBILINOGEN UR STRIP-MCNC: 0.2 MG/DL (ref 0.2–1)
WBC # BLD AUTO: 3.9 X10E3/UL (ref 3.4–10.8)
WBC #/AREA URNS HPF: NORMAL /HPF (ref 0–5)

## 2021-02-25 ENCOUNTER — OFFICE VISIT (OUTPATIENT)
Dept: RHEUMATOLOGY | Facility: CLINIC | Age: 66
End: 2021-02-25
Payer: COMMERCIAL

## 2021-02-25 VITALS
SYSTOLIC BLOOD PRESSURE: 111 MMHG | HEART RATE: 69 BPM | BODY MASS INDEX: 28.15 KG/M2 | DIASTOLIC BLOOD PRESSURE: 71 MMHG | HEIGHT: 64 IN | WEIGHT: 164.88 LBS

## 2021-02-25 DIAGNOSIS — R76.8 POSITIVE ANTI-CCP TEST: ICD-10-CM

## 2021-02-25 DIAGNOSIS — M35.9 UNDIFFERENTIATED CONNECTIVE TISSUE DISEASE: Primary | ICD-10-CM

## 2021-02-25 DIAGNOSIS — D89.2 HYPERGAMMAGLOBULINEMIA: ICD-10-CM

## 2021-02-25 DIAGNOSIS — K51.30 ULCERATIVE RECTOSIGMOIDITIS WITHOUT COMPLICATION: ICD-10-CM

## 2021-02-25 DIAGNOSIS — M35.00 SICCA SYNDROME: ICD-10-CM

## 2021-02-25 PROCEDURE — 3078F PR MOST RECENT DIASTOLIC BLOOD PRESSURE < 80 MM HG: ICD-10-PCS | Mod: CPTII,S$GLB,, | Performed by: PHYSICIAN ASSISTANT

## 2021-02-25 PROCEDURE — 99999 PR PBB SHADOW E&M-EST. PATIENT-LVL III: CPT | Mod: PBBFAC,,, | Performed by: PHYSICIAN ASSISTANT

## 2021-02-25 PROCEDURE — 3008F BODY MASS INDEX DOCD: CPT | Mod: CPTII,S$GLB,, | Performed by: PHYSICIAN ASSISTANT

## 2021-02-25 PROCEDURE — 99214 OFFICE O/P EST MOD 30 MIN: CPT | Mod: S$GLB,,, | Performed by: PHYSICIAN ASSISTANT

## 2021-02-25 PROCEDURE — 3008F PR BODY MASS INDEX (BMI) DOCUMENTED: ICD-10-PCS | Mod: CPTII,S$GLB,, | Performed by: PHYSICIAN ASSISTANT

## 2021-02-25 PROCEDURE — 99999 PR PBB SHADOW E&M-EST. PATIENT-LVL III: ICD-10-PCS | Mod: PBBFAC,,, | Performed by: PHYSICIAN ASSISTANT

## 2021-02-25 PROCEDURE — 1126F AMNT PAIN NOTED NONE PRSNT: CPT | Mod: S$GLB,,, | Performed by: PHYSICIAN ASSISTANT

## 2021-02-25 PROCEDURE — 99214 PR OFFICE/OUTPT VISIT, EST, LEVL IV, 30-39 MIN: ICD-10-PCS | Mod: S$GLB,,, | Performed by: PHYSICIAN ASSISTANT

## 2021-02-25 PROCEDURE — 1126F PR PAIN SEVERITY QUANTIFIED, NO PAIN PRESENT: ICD-10-PCS | Mod: S$GLB,,, | Performed by: PHYSICIAN ASSISTANT

## 2021-02-25 PROCEDURE — 3074F PR MOST RECENT SYSTOLIC BLOOD PRESSURE < 130 MM HG: ICD-10-PCS | Mod: CPTII,S$GLB,, | Performed by: PHYSICIAN ASSISTANT

## 2021-02-25 PROCEDURE — 3074F SYST BP LT 130 MM HG: CPT | Mod: CPTII,S$GLB,, | Performed by: PHYSICIAN ASSISTANT

## 2021-02-25 PROCEDURE — 3078F DIAST BP <80 MM HG: CPT | Mod: CPTII,S$GLB,, | Performed by: PHYSICIAN ASSISTANT

## 2021-03-03 ENCOUNTER — PATIENT MESSAGE (OUTPATIENT)
Dept: FAMILY MEDICINE | Facility: CLINIC | Age: 66
End: 2021-03-03

## 2021-03-17 ENCOUNTER — IMMUNIZATION (OUTPATIENT)
Dept: INTERNAL MEDICINE | Facility: CLINIC | Age: 66
End: 2021-03-17
Payer: COMMERCIAL

## 2021-03-17 DIAGNOSIS — Z23 NEED FOR VACCINATION: Primary | ICD-10-CM

## 2021-03-17 PROCEDURE — 91300 COVID-19, MRNA, LNP-S, PF, 30 MCG/0.3 ML DOSE VACCINE: CPT | Mod: PBBFAC | Performed by: FAMILY MEDICINE

## 2021-04-07 ENCOUNTER — IMMUNIZATION (OUTPATIENT)
Dept: INTERNAL MEDICINE | Facility: CLINIC | Age: 66
End: 2021-04-07
Payer: COMMERCIAL

## 2021-04-07 DIAGNOSIS — Z23 NEED FOR VACCINATION: Primary | ICD-10-CM

## 2021-04-07 PROCEDURE — 91300 COVID-19, MRNA, LNP-S, PF, 30 MCG/0.3 ML DOSE VACCINE: CPT | Mod: PBBFAC | Performed by: FAMILY MEDICINE

## 2021-04-07 PROCEDURE — 0002A COVID-19, MRNA, LNP-S, PF, 30 MCG/0.3 ML DOSE VACCINE: CPT | Mod: PBBFAC | Performed by: FAMILY MEDICINE

## 2021-08-09 ENCOUNTER — PATIENT MESSAGE (OUTPATIENT)
Dept: FAMILY MEDICINE | Facility: CLINIC | Age: 66
End: 2021-08-09

## 2021-08-10 ENCOUNTER — PATIENT MESSAGE (OUTPATIENT)
Dept: FAMILY MEDICINE | Facility: CLINIC | Age: 66
End: 2021-08-10

## 2021-08-16 LAB
ALBUMIN SERPL-MCNC: 4.3 G/DL (ref 3.8–4.8)
ALBUMIN/GLOB SERPL: 1.5 {RATIO} (ref 1.2–2.2)
ALP SERPL-CCNC: 68 IU/L (ref 48–121)
ALT SERPL-CCNC: 14 IU/L (ref 0–32)
APPEARANCE UR: CLEAR
AST SERPL-CCNC: 23 IU/L (ref 0–40)
BASOPHILS # BLD AUTO: 0 X10E3/UL (ref 0–0.2)
BASOPHILS NFR BLD AUTO: 1 %
BILIRUB SERPL-MCNC: 0.4 MG/DL (ref 0–1.2)
BILIRUB UR QL STRIP: NEGATIVE
BUN SERPL-MCNC: 8 MG/DL (ref 8–27)
BUN/CREAT SERPL: 7 (ref 12–28)
C3 SERPL-MCNC: 135 MG/DL (ref 82–167)
C4 SERPL-MCNC: 35 MG/DL (ref 12–38)
CALCIUM SERPL-MCNC: 9.7 MG/DL (ref 8.7–10.3)
CHLORIDE SERPL-SCNC: 104 MMOL/L (ref 96–106)
CO2 SERPL-SCNC: 24 MMOL/L (ref 20–29)
COLOR UR: YELLOW
CREAT SERPL-MCNC: 1.12 MG/DL (ref 0.57–1)
CRP SERPL-MCNC: 1 MG/L (ref 0–10)
DSDNA AB SER-ACNC: <1 IU/ML (ref 0–9)
EOSINOPHIL # BLD AUTO: 0.1 X10E3/UL (ref 0–0.4)
EOSINOPHIL NFR BLD AUTO: 3 %
ERYTHROCYTE [DISTWIDTH] IN BLOOD BY AUTOMATED COUNT: 12.3 % (ref 11.7–15.4)
ERYTHROCYTE [SEDIMENTATION RATE] IN BLOOD BY WESTERGREN METHOD: 19 MM/HR (ref 0–40)
GLOBULIN SER CALC-MCNC: 2.8 G/DL (ref 1.5–4.5)
GLUCOSE SERPL-MCNC: 87 MG/DL (ref 65–99)
GLUCOSE UR QL: NEGATIVE
HCT VFR BLD AUTO: 38.6 % (ref 34–46.6)
HGB BLD-MCNC: 13.4 G/DL (ref 11.1–15.9)
HGB UR QL STRIP: NEGATIVE
IMM GRANULOCYTES # BLD AUTO: 0 X10E3/UL (ref 0–0.1)
IMM GRANULOCYTES NFR BLD AUTO: 0 %
KETONES UR QL STRIP: NEGATIVE
LEUKOCYTE ESTERASE UR QL STRIP: NEGATIVE
LYMPHOCYTES # BLD AUTO: 1.5 X10E3/UL (ref 0.7–3.1)
LYMPHOCYTES NFR BLD AUTO: 38 %
MCH RBC QN AUTO: 31.9 PG (ref 26.6–33)
MCHC RBC AUTO-ENTMCNC: 34.7 G/DL (ref 31.5–35.7)
MCV RBC AUTO: 92 FL (ref 79–97)
MICRO URNS: NORMAL
MONOCYTES # BLD AUTO: 0.5 X10E3/UL (ref 0.1–0.9)
MONOCYTES NFR BLD AUTO: 12 %
NEUTROPHILS # BLD AUTO: 1.9 X10E3/UL (ref 1.4–7)
NEUTROPHILS NFR BLD AUTO: 46 %
NITRITE UR QL STRIP: NEGATIVE
PH UR STRIP: 7 [PH] (ref 5–7.5)
PLATELET # BLD AUTO: 208 X10E3/UL (ref 150–450)
POTASSIUM SERPL-SCNC: 4.1 MMOL/L (ref 3.5–5.2)
PROT SERPL-MCNC: 7.1 G/DL (ref 6–8.5)
PROT UR QL STRIP: NEGATIVE
RBC # BLD AUTO: 4.2 X10E6/UL (ref 3.77–5.28)
SODIUM SERPL-SCNC: 142 MMOL/L (ref 134–144)
SP GR UR: 1.02 (ref 1–1.03)
UROBILINOGEN UR STRIP-MCNC: 0.2 MG/DL (ref 0.2–1)
WBC # BLD AUTO: 4 X10E3/UL (ref 3.4–10.8)

## 2021-08-16 RX ORDER — OLMESARTAN MEDOXOMIL / AMLODIPINE BESYLATE / HYDROCHLOROTHIAZIDE 40; 10; 12.5 MG/1; MG/1; MG/1
TABLET, FILM COATED ORAL
Qty: 30 TABLET | Refills: 0 | Status: SHIPPED | OUTPATIENT
Start: 2021-08-16 | End: 2021-08-22

## 2021-08-31 ENCOUNTER — OFFICE VISIT (OUTPATIENT)
Dept: RHEUMATOLOGY | Facility: CLINIC | Age: 66
End: 2021-08-31
Payer: MEDICARE

## 2021-08-31 ENCOUNTER — HOSPITAL ENCOUNTER (OUTPATIENT)
Dept: RADIOLOGY | Facility: HOSPITAL | Age: 66
Discharge: HOME | End: 2021-08-31
Attending: INTERNAL MEDICINE
Payer: MEDICARE

## 2021-08-31 VITALS
HEIGHT: 64 IN | WEIGHT: 166.88 LBS | HEART RATE: 64 BPM | DIASTOLIC BLOOD PRESSURE: 73 MMHG | SYSTOLIC BLOOD PRESSURE: 125 MMHG | BODY MASS INDEX: 28.49 KG/M2

## 2021-08-31 DIAGNOSIS — Z79.899 LONG-TERM USE OF PLAQUENIL: ICD-10-CM

## 2021-08-31 DIAGNOSIS — M35.9 UNDIFFERENTIATED CONNECTIVE TISSUE DISEASE: Primary | ICD-10-CM

## 2021-08-31 DIAGNOSIS — M54.50 CHRONIC LEFT-SIDED LOW BACK PAIN WITHOUT SCIATICA: ICD-10-CM

## 2021-08-31 DIAGNOSIS — G89.29 CHRONIC LEFT-SIDED LOW BACK PAIN WITHOUT SCIATICA: ICD-10-CM

## 2021-08-31 DIAGNOSIS — M35.00 SICCA SYNDROME: ICD-10-CM

## 2021-08-31 PROCEDURE — 99999 PR PBB SHADOW E&M-EST. PATIENT-LVL III: ICD-10-PCS | Mod: PBBFAC,,, | Performed by: INTERNAL MEDICINE

## 2021-08-31 PROCEDURE — 99999 PR PBB SHADOW E&M-EST. PATIENT-LVL III: CPT | Mod: PBBFAC,,, | Performed by: INTERNAL MEDICINE

## 2021-08-31 PROCEDURE — 99213 OFFICE O/P EST LOW 20 MIN: CPT | Mod: PBBFAC | Performed by: INTERNAL MEDICINE

## 2021-08-31 PROCEDURE — 99215 OFFICE O/P EST HI 40 MIN: CPT | Mod: S$PBB,,, | Performed by: INTERNAL MEDICINE

## 2021-08-31 PROCEDURE — 72100 X-RAY EXAM L-S SPINE 2/3 VWS: CPT | Mod: TC

## 2021-08-31 PROCEDURE — 99215 PR OFFICE/OUTPT VISIT, EST, LEVL V, 40-54 MIN: ICD-10-PCS | Mod: S$PBB,,, | Performed by: INTERNAL MEDICINE

## 2021-08-31 PROCEDURE — 72100 XR LUMBAR SPINE AP AND LATERAL: ICD-10-PCS | Mod: 26,,, | Performed by: RADIOLOGY

## 2021-08-31 PROCEDURE — 72100 X-RAY EXAM L-S SPINE 2/3 VWS: CPT | Mod: 26,,, | Performed by: RADIOLOGY

## 2021-08-31 RX ORDER — HYDROXYCHLOROQUINE SULFATE 200 MG/1
200 TABLET, FILM COATED ORAL
Qty: 90 TABLET | Refills: 0 | Status: SHIPPED | OUTPATIENT
Start: 2021-09-01 | End: 2021-09-12

## 2021-09-14 ENCOUNTER — OFFICE VISIT (OUTPATIENT)
Dept: FAMILY MEDICINE | Facility: CLINIC | Age: 66
End: 2021-09-14
Payer: MEDICARE

## 2021-09-14 VITALS
OXYGEN SATURATION: 97 % | BODY MASS INDEX: 28.19 KG/M2 | TEMPERATURE: 98 F | SYSTOLIC BLOOD PRESSURE: 128 MMHG | HEIGHT: 64 IN | HEART RATE: 100 BPM | DIASTOLIC BLOOD PRESSURE: 76 MMHG | WEIGHT: 165.13 LBS

## 2021-09-14 DIAGNOSIS — M35.00 SICCA SYNDROME: ICD-10-CM

## 2021-09-14 DIAGNOSIS — Z12.4 CERVICAL CANCER SCREENING: ICD-10-CM

## 2021-09-14 DIAGNOSIS — E78.5 HYPERLIPIDEMIA, UNSPECIFIED HYPERLIPIDEMIA TYPE: Chronic | ICD-10-CM

## 2021-09-14 DIAGNOSIS — R87.810 CERVICAL HIGH RISK HPV (HUMAN PAPILLOMAVIRUS) TEST POSITIVE: ICD-10-CM

## 2021-09-14 DIAGNOSIS — M35.9 UNDIFFERENTIATED CONNECTIVE TISSUE DISEASE: ICD-10-CM

## 2021-09-14 DIAGNOSIS — Z23 NEED FOR VACCINATION: ICD-10-CM

## 2021-09-14 DIAGNOSIS — Z91.89 GYN EXAM FOR HIGH-RISK MEDICARE PATIENT: ICD-10-CM

## 2021-09-14 DIAGNOSIS — Z12.31 ENCOUNTER FOR SCREENING MAMMOGRAM FOR MALIGNANT NEOPLASM OF BREAST: ICD-10-CM

## 2021-09-14 DIAGNOSIS — Z78.0 POSTMENOPAUSAL: ICD-10-CM

## 2021-09-14 DIAGNOSIS — I10 ESSENTIAL HYPERTENSION: Primary | Chronic | ICD-10-CM

## 2021-09-14 PROCEDURE — 99214 OFFICE O/P EST MOD 30 MIN: CPT | Mod: 25,S$PBB,, | Performed by: FAMILY MEDICINE

## 2021-09-14 PROCEDURE — 99999 PR PBB SHADOW E&M-EST. PATIENT-LVL IV: ICD-10-PCS | Mod: PBBFAC,,, | Performed by: FAMILY MEDICINE

## 2021-09-14 PROCEDURE — G0101 CA SCREEN;PELVIC/BREAST EXAM: HCPCS | Mod: S$PBB,,, | Performed by: FAMILY MEDICINE

## 2021-09-14 PROCEDURE — G0009 ADMIN PNEUMOCOCCAL VACCINE: HCPCS | Mod: PBBFAC,PO

## 2021-09-14 PROCEDURE — 99214 PR OFFICE/OUTPT VISIT, EST, LEVL IV, 30-39 MIN: ICD-10-PCS | Mod: 25,S$PBB,, | Performed by: FAMILY MEDICINE

## 2021-09-14 PROCEDURE — G0101 CA SCREEN;PELVIC/BREAST EXAM: HCPCS | Mod: PBBFAC,PO | Performed by: FAMILY MEDICINE

## 2021-09-14 PROCEDURE — 99999 PR PBB SHADOW E&M-EST. PATIENT-LVL IV: CPT | Mod: PBBFAC,,, | Performed by: FAMILY MEDICINE

## 2021-09-14 PROCEDURE — G0101 PR CA SCREEN;PELVIC/BREAST EXAM: ICD-10-PCS | Mod: S$PBB,,, | Performed by: FAMILY MEDICINE

## 2021-09-14 PROCEDURE — 90732 PPSV23 VACC 2 YRS+ SUBQ/IM: CPT | Mod: PBBFAC,PO

## 2021-09-14 PROCEDURE — 99214 OFFICE O/P EST MOD 30 MIN: CPT | Mod: PBBFAC,25,PO | Performed by: FAMILY MEDICINE

## 2021-09-14 PROCEDURE — 88175 CYTOPATH C/V AUTO FLUID REDO: CPT | Performed by: FAMILY MEDICINE

## 2021-09-17 ENCOUNTER — CLINICAL SUPPORT (OUTPATIENT)
Dept: REHABILITATION | Facility: HOSPITAL | Age: 66
End: 2021-09-17
Payer: MEDICARE

## 2021-09-17 DIAGNOSIS — M54.50 CHRONIC LEFT-SIDED LOW BACK PAIN WITHOUT SCIATICA: ICD-10-CM

## 2021-09-17 DIAGNOSIS — R53.1 DECREASED STRENGTH: ICD-10-CM

## 2021-09-17 DIAGNOSIS — G89.29 CHRONIC LEFT-SIDED LOW BACK PAIN WITHOUT SCIATICA: ICD-10-CM

## 2021-09-17 LAB
FINAL PATHOLOGIC DIAGNOSIS: NORMAL
Lab: NORMAL

## 2021-09-17 PROCEDURE — 97161 PT EVAL LOW COMPLEX 20 MIN: CPT

## 2021-09-17 PROCEDURE — 97110 THERAPEUTIC EXERCISES: CPT

## 2021-09-17 PROCEDURE — 97014 ELECTRIC STIMULATION THERAPY: CPT

## 2021-09-20 ENCOUNTER — PATIENT MESSAGE (OUTPATIENT)
Dept: REHABILITATION | Facility: HOSPITAL | Age: 66
End: 2021-09-20

## 2021-09-21 ENCOUNTER — HOSPITAL ENCOUNTER (OUTPATIENT)
Dept: RADIOLOGY | Facility: HOSPITAL | Age: 66
Discharge: HOME | End: 2021-09-21
Attending: FAMILY MEDICINE
Payer: MEDICARE

## 2021-09-21 DIAGNOSIS — Z12.31 ENCOUNTER FOR SCREENING MAMMOGRAM FOR MALIGNANT NEOPLASM OF BREAST: ICD-10-CM

## 2021-09-21 PROCEDURE — 77067 SCR MAMMO BI INCL CAD: CPT | Mod: 26,,, | Performed by: RADIOLOGY

## 2021-09-21 PROCEDURE — 77067 SCR MAMMO BI INCL CAD: CPT | Mod: TC

## 2021-09-21 PROCEDURE — 77067 MAMMO DIGITAL SCREENING BILAT WITH TOMO: ICD-10-PCS | Mod: 26,,, | Performed by: RADIOLOGY

## 2021-09-21 PROCEDURE — 77063 BREAST TOMOSYNTHESIS BI: CPT | Mod: 26,,, | Performed by: RADIOLOGY

## 2021-09-21 PROCEDURE — 77063 MAMMO DIGITAL SCREENING BILAT WITH TOMO: ICD-10-PCS | Mod: 26,,, | Performed by: RADIOLOGY

## 2021-09-24 ENCOUNTER — CLINICAL SUPPORT (OUTPATIENT)
Dept: REHABILITATION | Facility: HOSPITAL | Age: 66
End: 2021-09-24
Payer: MEDICARE

## 2021-09-24 DIAGNOSIS — R53.1 DECREASED STRENGTH: ICD-10-CM

## 2021-09-24 PROCEDURE — 97110 THERAPEUTIC EXERCISES: CPT

## 2021-09-29 ENCOUNTER — CLINICAL SUPPORT (OUTPATIENT)
Dept: REHABILITATION | Facility: HOSPITAL | Age: 66
End: 2021-09-29
Payer: MEDICARE

## 2021-09-29 DIAGNOSIS — R53.1 DECREASED STRENGTH: ICD-10-CM

## 2021-09-29 PROCEDURE — 97110 THERAPEUTIC EXERCISES: CPT

## 2021-10-01 ENCOUNTER — CLINICAL SUPPORT (OUTPATIENT)
Dept: REHABILITATION | Facility: HOSPITAL | Age: 66
End: 2021-10-01
Payer: MEDICARE

## 2021-10-01 DIAGNOSIS — R53.1 DECREASED STRENGTH: ICD-10-CM

## 2021-10-01 PROCEDURE — 97110 THERAPEUTIC EXERCISES: CPT

## 2021-10-06 ENCOUNTER — CLINICAL SUPPORT (OUTPATIENT)
Dept: REHABILITATION | Facility: HOSPITAL | Age: 66
End: 2021-10-06
Payer: MEDICARE

## 2021-10-06 DIAGNOSIS — R53.1 DECREASED STRENGTH: ICD-10-CM

## 2021-10-06 PROCEDURE — 97110 THERAPEUTIC EXERCISES: CPT

## 2021-10-08 ENCOUNTER — CLINICAL SUPPORT (OUTPATIENT)
Dept: REHABILITATION | Facility: HOSPITAL | Age: 66
End: 2021-10-08
Payer: MEDICARE

## 2021-10-08 ENCOUNTER — PATIENT MESSAGE (OUTPATIENT)
Dept: REHABILITATION | Facility: HOSPITAL | Age: 66
End: 2021-10-08

## 2021-10-08 ENCOUNTER — TELEPHONE (OUTPATIENT)
Dept: RHEUMATOLOGY | Facility: CLINIC | Age: 66
End: 2021-10-08

## 2021-10-08 DIAGNOSIS — N81.89 PELVIC FLOOR WEAKNESS IN FEMALE: Primary | ICD-10-CM

## 2021-10-08 DIAGNOSIS — R53.1 DECREASED STRENGTH: ICD-10-CM

## 2021-10-08 PROCEDURE — 97110 THERAPEUTIC EXERCISES: CPT

## 2021-10-11 ENCOUNTER — CLINICAL SUPPORT (OUTPATIENT)
Dept: REHABILITATION | Facility: HOSPITAL | Age: 66
End: 2021-10-11
Payer: MEDICARE

## 2021-10-11 DIAGNOSIS — R35.0 URINARY FREQUENCY: ICD-10-CM

## 2021-10-11 DIAGNOSIS — N81.89 PELVIC FLOOR WEAKNESS IN FEMALE: ICD-10-CM

## 2021-10-11 DIAGNOSIS — N81.89 PELVIC FLOOR WEAKNESS: ICD-10-CM

## 2021-10-11 DIAGNOSIS — K59.00 CONSTIPATION, UNSPECIFIED CONSTIPATION TYPE: ICD-10-CM

## 2021-10-11 PROCEDURE — 97530 THERAPEUTIC ACTIVITIES: CPT

## 2021-10-11 PROCEDURE — 97162 PT EVAL MOD COMPLEX 30 MIN: CPT

## 2021-10-18 ENCOUNTER — CLINICAL SUPPORT (OUTPATIENT)
Dept: REHABILITATION | Facility: HOSPITAL | Age: 66
End: 2021-10-18
Payer: MEDICARE

## 2021-10-18 DIAGNOSIS — R35.0 URINARY FREQUENCY: ICD-10-CM

## 2021-10-18 DIAGNOSIS — N81.89 PELVIC FLOOR WEAKNESS: ICD-10-CM

## 2021-10-18 DIAGNOSIS — K59.00 CONSTIPATION, UNSPECIFIED CONSTIPATION TYPE: Primary | ICD-10-CM

## 2021-10-18 PROCEDURE — 97112 NEUROMUSCULAR REEDUCATION: CPT

## 2021-10-18 PROCEDURE — 97530 THERAPEUTIC ACTIVITIES: CPT

## 2021-10-20 ENCOUNTER — CLINICAL SUPPORT (OUTPATIENT)
Dept: REHABILITATION | Facility: HOSPITAL | Age: 66
End: 2021-10-20
Payer: MEDICARE

## 2021-10-20 DIAGNOSIS — R53.1 DECREASED STRENGTH: ICD-10-CM

## 2021-10-20 PROCEDURE — 97110 THERAPEUTIC EXERCISES: CPT

## 2021-10-22 ENCOUNTER — CLINICAL SUPPORT (OUTPATIENT)
Dept: REHABILITATION | Facility: HOSPITAL | Age: 66
End: 2021-10-22
Payer: MEDICARE

## 2021-10-22 DIAGNOSIS — R53.1 DECREASED STRENGTH: ICD-10-CM

## 2021-10-22 PROCEDURE — 97110 THERAPEUTIC EXERCISES: CPT | Mod: KX

## 2021-10-26 ENCOUNTER — PATIENT OUTREACH (OUTPATIENT)
Dept: ADMINISTRATIVE | Facility: HOSPITAL | Age: 66
End: 2021-10-26
Payer: MEDICARE

## 2021-10-26 ENCOUNTER — CLINICAL SUPPORT (OUTPATIENT)
Dept: REHABILITATION | Facility: HOSPITAL | Age: 66
End: 2021-10-26
Payer: COMMERCIAL

## 2021-10-26 DIAGNOSIS — K59.00 CONSTIPATION, UNSPECIFIED CONSTIPATION TYPE: Primary | ICD-10-CM

## 2021-10-26 DIAGNOSIS — R35.0 URINARY FREQUENCY: ICD-10-CM

## 2021-10-26 DIAGNOSIS — N81.89 PELVIC FLOOR WEAKNESS: ICD-10-CM

## 2021-10-26 PROCEDURE — 97530 THERAPEUTIC ACTIVITIES: CPT

## 2021-10-26 PROCEDURE — 97110 THERAPEUTIC EXERCISES: CPT

## 2021-10-26 PROCEDURE — 97112 NEUROMUSCULAR REEDUCATION: CPT

## 2021-10-29 ENCOUNTER — CLINICAL SUPPORT (OUTPATIENT)
Dept: REHABILITATION | Facility: HOSPITAL | Age: 66
End: 2021-10-29
Payer: COMMERCIAL

## 2021-10-29 DIAGNOSIS — R53.1 DECREASED STRENGTH: ICD-10-CM

## 2021-10-29 PROCEDURE — 97110 THERAPEUTIC EXERCISES: CPT

## 2021-11-01 ENCOUNTER — CLINICAL SUPPORT (OUTPATIENT)
Dept: REHABILITATION | Facility: HOSPITAL | Age: 66
End: 2021-11-01
Payer: MEDICARE

## 2021-11-01 DIAGNOSIS — R35.0 URINARY FREQUENCY: ICD-10-CM

## 2021-11-01 DIAGNOSIS — K59.00 CONSTIPATION, UNSPECIFIED CONSTIPATION TYPE: Primary | ICD-10-CM

## 2021-11-01 DIAGNOSIS — N81.89 PELVIC FLOOR WEAKNESS: ICD-10-CM

## 2021-11-01 PROCEDURE — 97530 THERAPEUTIC ACTIVITIES: CPT

## 2021-11-01 PROCEDURE — 97110 THERAPEUTIC EXERCISES: CPT

## 2021-11-01 PROCEDURE — 97112 NEUROMUSCULAR REEDUCATION: CPT

## 2021-11-03 ENCOUNTER — CLINICAL SUPPORT (OUTPATIENT)
Dept: REHABILITATION | Facility: HOSPITAL | Age: 66
End: 2021-11-03
Payer: MEDICARE

## 2021-11-03 DIAGNOSIS — R53.1 DECREASED STRENGTH: ICD-10-CM

## 2021-11-03 PROCEDURE — 97110 THERAPEUTIC EXERCISES: CPT

## 2021-11-08 ENCOUNTER — CLINICAL SUPPORT (OUTPATIENT)
Dept: REHABILITATION | Facility: HOSPITAL | Age: 66
End: 2021-11-08
Payer: COMMERCIAL

## 2021-11-08 DIAGNOSIS — K59.00 CONSTIPATION, UNSPECIFIED CONSTIPATION TYPE: Primary | ICD-10-CM

## 2021-11-08 DIAGNOSIS — N81.89 PELVIC FLOOR WEAKNESS: ICD-10-CM

## 2021-11-08 DIAGNOSIS — R35.0 URINARY FREQUENCY: ICD-10-CM

## 2021-11-08 PROCEDURE — 97530 THERAPEUTIC ACTIVITIES: CPT

## 2021-11-08 PROCEDURE — 97112 NEUROMUSCULAR REEDUCATION: CPT

## 2021-11-08 PROCEDURE — 97110 THERAPEUTIC EXERCISES: CPT

## 2021-11-10 ENCOUNTER — CLINICAL SUPPORT (OUTPATIENT)
Dept: REHABILITATION | Facility: HOSPITAL | Age: 66
End: 2021-11-10
Payer: COMMERCIAL

## 2021-11-10 DIAGNOSIS — R53.1 DECREASED STRENGTH: ICD-10-CM

## 2021-11-10 PROCEDURE — 97110 THERAPEUTIC EXERCISES: CPT

## 2021-11-11 ENCOUNTER — PATIENT MESSAGE (OUTPATIENT)
Dept: REHABILITATION | Facility: HOSPITAL | Age: 66
End: 2021-11-11
Payer: MEDICARE

## 2021-11-15 ENCOUNTER — CLINICAL SUPPORT (OUTPATIENT)
Dept: REHABILITATION | Facility: HOSPITAL | Age: 66
End: 2021-11-15
Payer: COMMERCIAL

## 2021-11-15 DIAGNOSIS — R35.0 URINARY FREQUENCY: ICD-10-CM

## 2021-11-15 DIAGNOSIS — N81.89 PELVIC FLOOR WEAKNESS: ICD-10-CM

## 2021-11-15 DIAGNOSIS — K59.00 CONSTIPATION, UNSPECIFIED CONSTIPATION TYPE: Primary | ICD-10-CM

## 2021-11-15 PROCEDURE — 97110 THERAPEUTIC EXERCISES: CPT

## 2021-11-15 PROCEDURE — 97112 NEUROMUSCULAR REEDUCATION: CPT

## 2021-11-15 PROCEDURE — 97530 THERAPEUTIC ACTIVITIES: CPT

## 2021-11-17 ENCOUNTER — CLINICAL SUPPORT (OUTPATIENT)
Dept: REHABILITATION | Facility: HOSPITAL | Age: 66
End: 2021-11-17
Payer: MEDICARE

## 2021-11-17 ENCOUNTER — PATIENT MESSAGE (OUTPATIENT)
Dept: REHABILITATION | Facility: HOSPITAL | Age: 66
End: 2021-11-17

## 2021-11-17 DIAGNOSIS — R53.1 DECREASED STRENGTH: ICD-10-CM

## 2021-11-17 PROCEDURE — 97110 THERAPEUTIC EXERCISES: CPT

## 2021-11-22 ENCOUNTER — CLINICAL SUPPORT (OUTPATIENT)
Dept: REHABILITATION | Facility: HOSPITAL | Age: 66
End: 2021-11-22
Payer: MEDICARE

## 2021-11-22 DIAGNOSIS — N81.89 PELVIC FLOOR WEAKNESS: ICD-10-CM

## 2021-11-22 DIAGNOSIS — K59.00 CONSTIPATION, UNSPECIFIED CONSTIPATION TYPE: Primary | ICD-10-CM

## 2021-11-22 DIAGNOSIS — R35.0 URINARY FREQUENCY: ICD-10-CM

## 2021-11-22 PROCEDURE — 97110 THERAPEUTIC EXERCISES: CPT

## 2021-11-22 PROCEDURE — 97530 THERAPEUTIC ACTIVITIES: CPT

## 2021-11-22 PROCEDURE — 97112 NEUROMUSCULAR REEDUCATION: CPT

## 2021-11-30 ENCOUNTER — CLINICAL SUPPORT (OUTPATIENT)
Dept: REHABILITATION | Facility: HOSPITAL | Age: 66
End: 2021-11-30
Payer: COMMERCIAL

## 2021-11-30 DIAGNOSIS — R35.0 URINARY FREQUENCY: ICD-10-CM

## 2021-11-30 DIAGNOSIS — K59.00 CONSTIPATION, UNSPECIFIED CONSTIPATION TYPE: Primary | ICD-10-CM

## 2021-11-30 DIAGNOSIS — N81.89 PELVIC FLOOR WEAKNESS: ICD-10-CM

## 2021-11-30 PROCEDURE — 97112 NEUROMUSCULAR REEDUCATION: CPT

## 2021-11-30 PROCEDURE — 97530 THERAPEUTIC ACTIVITIES: CPT

## 2021-11-30 PROCEDURE — 97110 THERAPEUTIC EXERCISES: CPT

## 2021-12-01 ENCOUNTER — CLINICAL SUPPORT (OUTPATIENT)
Dept: REHABILITATION | Facility: HOSPITAL | Age: 66
End: 2021-12-01
Payer: COMMERCIAL

## 2021-12-01 DIAGNOSIS — R53.1 DECREASED STRENGTH: ICD-10-CM

## 2021-12-01 PROCEDURE — 97110 THERAPEUTIC EXERCISES: CPT

## 2021-12-02 ENCOUNTER — PATIENT OUTREACH (OUTPATIENT)
Dept: ADMINISTRATIVE | Facility: OTHER | Age: 66
End: 2021-12-02
Payer: MEDICARE

## 2021-12-08 ENCOUNTER — PATIENT MESSAGE (OUTPATIENT)
Dept: REHABILITATION | Facility: HOSPITAL | Age: 66
End: 2021-12-08
Payer: MEDICARE

## 2021-12-10 ENCOUNTER — IMMUNIZATION (OUTPATIENT)
Dept: PHARMACY | Facility: CLINIC | Age: 66
End: 2021-12-10
Payer: MEDICARE

## 2021-12-10 DIAGNOSIS — Z23 NEED FOR VACCINATION: Primary | ICD-10-CM

## 2021-12-13 ENCOUNTER — CLINICAL SUPPORT (OUTPATIENT)
Dept: REHABILITATION | Facility: HOSPITAL | Age: 66
End: 2021-12-13
Payer: MEDICARE

## 2021-12-13 DIAGNOSIS — R35.0 URINARY FREQUENCY: ICD-10-CM

## 2021-12-13 DIAGNOSIS — K59.00 CONSTIPATION, UNSPECIFIED CONSTIPATION TYPE: Primary | ICD-10-CM

## 2021-12-13 DIAGNOSIS — N81.89 PELVIC FLOOR WEAKNESS: ICD-10-CM

## 2021-12-13 PROCEDURE — 97530 THERAPEUTIC ACTIVITIES: CPT | Mod: KX

## 2021-12-13 PROCEDURE — 97112 NEUROMUSCULAR REEDUCATION: CPT | Mod: KX

## 2021-12-15 ENCOUNTER — CLINICAL SUPPORT (OUTPATIENT)
Dept: REHABILITATION | Facility: HOSPITAL | Age: 66
End: 2021-12-15
Payer: MEDICARE

## 2021-12-15 DIAGNOSIS — R53.1 DECREASED STRENGTH: ICD-10-CM

## 2021-12-15 PROCEDURE — 97110 THERAPEUTIC EXERCISES: CPT

## 2021-12-20 ENCOUNTER — CLINICAL SUPPORT (OUTPATIENT)
Dept: REHABILITATION | Facility: HOSPITAL | Age: 66
End: 2021-12-20
Payer: MEDICARE

## 2021-12-20 DIAGNOSIS — N81.89 PELVIC FLOOR WEAKNESS: ICD-10-CM

## 2021-12-20 DIAGNOSIS — K59.00 CONSTIPATION, UNSPECIFIED CONSTIPATION TYPE: Primary | ICD-10-CM

## 2021-12-20 DIAGNOSIS — R35.0 URINARY FREQUENCY: ICD-10-CM

## 2021-12-20 PROCEDURE — 97112 NEUROMUSCULAR REEDUCATION: CPT

## 2021-12-20 PROCEDURE — 97110 THERAPEUTIC EXERCISES: CPT

## 2021-12-20 PROCEDURE — 97530 THERAPEUTIC ACTIVITIES: CPT

## 2021-12-22 ENCOUNTER — TELEPHONE (OUTPATIENT)
Dept: RHEUMATOLOGY | Facility: CLINIC | Age: 66
End: 2021-12-22
Payer: MEDICARE

## 2021-12-22 ENCOUNTER — PATIENT MESSAGE (OUTPATIENT)
Dept: RHEUMATOLOGY | Facility: CLINIC | Age: 66
End: 2021-12-22
Payer: MEDICARE

## 2022-01-04 ENCOUNTER — PATIENT OUTREACH (OUTPATIENT)
Dept: ADMINISTRATIVE | Facility: OTHER | Age: 67
End: 2022-01-04
Payer: MEDICARE

## 2022-01-04 NOTE — PROGRESS NOTES
Health Maintenance Due   Topic Date Due    Shingles Vaccine (1 of 2) Never done    Influenza Vaccine (1) 09/01/2021     Updates were requested from care everywhere.  Chart was reviewed for overdue Proactive [facility] Encounters (CAROLE) topics (CRS, Breast Cancer Screening, Eye exam)  Health Maintenance has been updated.  LINKS immunization registry triggered.  Immunizations were reconciled.

## 2022-01-05 ENCOUNTER — OFFICE VISIT (OUTPATIENT)
Dept: OPHTHALMOLOGY | Facility: CLINIC | Age: 67
End: 2022-01-05
Payer: MEDICARE

## 2022-01-05 DIAGNOSIS — H52.13 MYOPIA, BILATERAL: ICD-10-CM

## 2022-01-05 DIAGNOSIS — I10 ESSENTIAL HYPERTENSION: ICD-10-CM

## 2022-01-05 DIAGNOSIS — Z79.899 LONG-TERM USE OF PLAQUENIL: ICD-10-CM

## 2022-01-05 DIAGNOSIS — H52.4 BILATERAL PRESBYOPIA: ICD-10-CM

## 2022-01-05 DIAGNOSIS — M35.9 UNDIFFERENTIATED CONNECTIVE TISSUE DISEASE: Primary | ICD-10-CM

## 2022-01-05 DIAGNOSIS — H25.13 CATARACT, NUCLEAR SCLEROTIC SENILE, BILATERAL: ICD-10-CM

## 2022-01-05 PROCEDURE — 92014 PR EYE EXAM, EST PATIENT,COMPREHESV: ICD-10-PCS | Mod: S$PBB,,, | Performed by: OPTOMETRIST

## 2022-01-05 PROCEDURE — 99999 PR PBB SHADOW E&M-EST. PATIENT-LVL II: CPT | Mod: PBBFAC,,, | Performed by: OPTOMETRIST

## 2022-01-05 PROCEDURE — 99212 OFFICE O/P EST SF 10 MIN: CPT | Mod: PBBFAC | Performed by: OPTOMETRIST

## 2022-01-05 PROCEDURE — 99999 PR PBB SHADOW E&M-EST. PATIENT-LVL II: ICD-10-PCS | Mod: PBBFAC,,, | Performed by: OPTOMETRIST

## 2022-01-05 PROCEDURE — 92134 POSTERIOR SEGMENT OCT RETINA (OCULAR COHERENCE TOMOGRAPHY)-BOTH EYES: ICD-10-PCS | Mod: 26,S$PBB,, | Performed by: OPTOMETRIST

## 2022-01-05 PROCEDURE — 92134 CPTRZ OPH DX IMG PST SGM RTA: CPT | Mod: PBBFAC | Performed by: OPTOMETRIST

## 2022-01-05 PROCEDURE — 92083 EXTENDED VISUAL FIELD XM: CPT | Mod: PBBFAC | Performed by: OPTOMETRIST

## 2022-01-05 PROCEDURE — 92083 HUMPHREY VISUAL FIELD - OU - BOTH EYES: ICD-10-PCS | Mod: 26,S$PBB,, | Performed by: OPTOMETRIST

## 2022-01-05 PROCEDURE — 92015 PR REFRACTION: ICD-10-PCS | Mod: ,,, | Performed by: OPTOMETRIST

## 2022-01-05 PROCEDURE — 92015 DETERMINE REFRACTIVE STATE: CPT | Mod: ,,, | Performed by: OPTOMETRIST

## 2022-01-05 PROCEDURE — 92014 COMPRE OPH EXAM EST PT 1/>: CPT | Mod: S$PBB,,, | Performed by: OPTOMETRIST

## 2022-01-05 NOTE — PROGRESS NOTES
HPI     Decrease night visual acuity.  Plaquenil check.  Last eye exam 12/10/2020 TRF.  Update glasses RX.  HX of undifferentiated connective tissus disease.    Last edited by Hanna Alvarado MA on 1/5/2022  8:22 AM. (History)            Assessment /Plan     For exam results, see Encounter Report.    Undifferentiated connective tissue disease  -     Pang Visual Field - OU - Extended - Both Eyes  -     Posterior Segment OCT Retina-Both eyes    Essential hypertension    Cataract, nuclear sclerotic senile, bilateral    Myopia, bilateral    Bilateral presbyopia    Long-term use of Plaquenil  -     Pang Visual Field - OU - Extended - Both Eyes  -     Posterior Segment OCT Retina-Both eyes      No active anterior or posterior uveitis OU.  No ocular changes from Plaquenil use  Normal Amsler and mOCT OU    No HTN Retinopathy    Minimal cataracts OU, not surgical    Dispense Final Rx for glasses.  RTC 1 year  Discussed above and answered questions.

## 2022-01-10 NOTE — TELEPHONE ENCOUNTER
No new care gaps identified.  Powered by [employer]. Reference number: [id number].   1/10/2022 4:48:24 AM CST

## 2022-01-11 ENCOUNTER — CLINICAL SUPPORT (OUTPATIENT)
Dept: REHABILITATION | Facility: HOSPITAL | Age: 67
End: 2022-01-11
Payer: MEDICARE

## 2022-01-11 DIAGNOSIS — N81.89 PELVIC FLOOR WEAKNESS: ICD-10-CM

## 2022-01-11 DIAGNOSIS — K59.00 CONSTIPATION, UNSPECIFIED CONSTIPATION TYPE: Primary | ICD-10-CM

## 2022-01-11 DIAGNOSIS — R35.0 URINARY FREQUENCY: ICD-10-CM

## 2022-01-11 PROCEDURE — 97530 THERAPEUTIC ACTIVITIES: CPT

## 2022-01-11 PROCEDURE — 97112 NEUROMUSCULAR REEDUCATION: CPT

## 2022-01-11 PROCEDURE — 97110 THERAPEUTIC EXERCISES: CPT

## 2022-01-11 NOTE — PATIENT INSTRUCTIONS
SQUEEZE WITH THE EXHALE (aka, the kegel)!!!!   · If your urge is appropriate (I.e., your bladder is full): Use the urge control strategy to make the urge die down. Then start walking CALMLY to the bathroom. Stop as many times as needed on the way there as the urge returns. Repeat the process one more time once you get to the bathroom to ensure you are able to calmly pull down your pants. STAY CALM, use tools like your book on your phone to distract yourself.  · If your urge is not appropriate (I.e., your bladder is not full): Use the urge control strategy to delay the urge.  · In the car: stay as calm as possible! Use the radio or a podcast to distract you.    · Constipation management:  · Fiber (Activia)  · Glass of warm water in morning  · Bowel massage (1-2x per day)  · Positioning - stool (knees above hips), rest upper body on the knees/thighs with your forearms  · Letting PF relax to go pee and go poop  · Bearing down (widen the sit bones and bulge the pelvic floor) and blowing out as you bear down (can draw the lower belly in and make sure you are really letting the air out)  · Movement is really helpful    · Urinary:  · Never go just in case  · Never rush to the bathroom  · We want every 2-3 hours frequency and no leakage  · Want to go when the bladder is full but need to be able to hold it if we cannot go  · Urge control strategies  · Bladder and pelvic floor work in opposites (squeeze vs. Relax)     · Pelvic Floor Muscles:  · Strength - doing kegels at a regular pace (not holding) in sitting and standing   · Endurance - doing kegels with holds (do 2 sets of 10)  · Make sure you relax in between every repetition  · Diaphragmatic breathing every night      Pelvic Floor Diaphragmatic Breathing     The diaphragm is the most efficient muscle of breathing. It is a large, dome-shaped muscle located at the base of the lungs under the bottom of your ribcage. There are many benefits to diaphragmatic (belly) breathing  "including:      Strengthens the diaphragm (& other accessory muscles)    Helps slow down your breathing rate and calms you    Decreases oxygen demand and uses less effort and energy to breathe    Reduces stress and anxiety, which calms your nervous system    Improves the coordination and range of motion of your pelvic floor muscles, allowing them to relax       Technique:    Place your hands on your sides under your ribcage.    Try to push your hands out as you inhale. Feel your ribcage and abdomen expand.    As you exhale, blow the air out of your body. Feel your ribcage pull back in and down and your abdomen deflate.    Perform this for 5-10 minutes on most days of the week, allowing the entire body to relax as you do.               Whenever you inhale and expand your lower ribcage and abdomen, your pelvic floor muscles will relax and push down. Whenever you exhale and deflate your ribcage and abdomen, your pelvic floor muscles will tighten slightly and pull up. Your pelvic floor muscles copy the movement of your diaphragm! Try to tune in to this movement as you breathe.        Drink a glass of warm water in the morning    Bowel Massage for Constipation    Lie on your back with legs in a comfortable position.     1) Start at the lower left pelvis, just next to the left hip bone. Use 2-3 fingers with flat, overlapped hands and apply downward pressure, making 10 kneading circles. Remember the "traffic jam" analogy - you must clear the cars at the front of the jam before you can move the cars in the back of the jam.     2) Next, do the same thing starting at the right lower pelvis, just next to the right hip bone. After you have made 10 circles there, move slightly upward and repeat. You will do this as you follow the colon (large intestine) up toward the ribs, then across the stomach toward the left side of the belly, then all the way down to first spot next to the left hip bone. You can see the " "path you should follow in the picture below.      Perform this 2x per day, morning and night.             Bowel Movement Body Mechanics    1. Sit on the toilet comfortably with legs and buttocks relaxed.  2. Put your feet on a step stool or squatty potty (8 inches tall). This helps the poop come out easier.  3. Use good "potty posture": Lean forward while keeping your back straight and rest your elbows or forearms on your knees, keeping the knees apart.  4. The pelvic floor has to fully relax for the poop to come out. Let your whole body relax, even letting the belly hang. Try to think about fully relaxing or "dropping" the pelvic floor muscles. You may want to do a few diaphragmatic breaths to help with this.  5. Exhale like you are blowing out birthday candles while you gently bear down. Do not strain or hold your breath!                URINARY URGE CONTROL   What to do when you "gotta go, gotta go"  FREEZE, BREATHE, SQUEEZE, repeat    When you experience a strong urge to urinate and know that your bladder is not as full as it has the potential to be (for instance, if you used the restroom 30 minutes to 1.5 hours ago), you should use this as a strategy to continue holding until your bladder is full.    You will use this strategy to incrementally increase time between voids and to avoid leaking. Continue to increase the time between going to the bathroom in increments of 5-15 minutes (whatever you are able to do). This will not happen overnight, but you will be successful using these techniques to gradually increase the time between voids.    FIRST - FREEZE: Do your best not to panic or rush to the toilet! You are more likely to leak if you do. Stop whatever you are doing, stand or sit quietly, and stay as calm as possible.    SECOND - BREATHE: Relax and take a few good deep breaths, letting it out slowly. This helps you further calm the nervous system and settles your bladder. Try thinking of something else to " "distract yourself from the urge (example: list categories of items like animals, fruits, cars, etc.)    THIRD - SQUEEZE: Do 5-10 "Quick Flicks" (1 second LIFT and squeeze of pelvic floor muscles, followed by a full DROP). Pelvic floor contractions send a message to the bladder to relax and hold urine. Continue doing your best to remain calm and distract yourself from the urge until it passes.    FINALLY: If the urge returns and it has not been at least 2* hours since you last went to the bathroom, repeat the above steps to regain control and return to your normal activity. If it has been 2* or more hours, you can go ahead and walk normally to the bathroom to use it. Avoid starting to undress until you are in the bathroom and ready to urinate.    If the urge returns as you walk to the bathroom and feel like you may leak on yourself: Repeat this as many times as you need to on the way to the bathroom (i.e., every time the urge comes back). We only want to be walking calmly to the bathroom once the urge has passed. When you get inside and close the door behind you, repeat this process one last time so that you have enough time to get to the toilet and pull your pants down without rushing.    Try this at home first in case you do have an accident, but as you continue to practice, your bladder will increase the ability its capacity and hold more urine without such a strong urge.     *Note: This time amount will vary based on your starting point; for example, if you started out going every 30 minutes, you will be using whatever your goal time is (i.e., if you are trying to increase your time between voids by 15 min, you can go once 45 minutes have passed). Our eventual goal is every 2-4 hours because that's how long it takes the bladder to fill completely, and we only want to go when the bladder is full.       Remember......"Control your bladder before it controls YOU!"    ALSO: Every time you stand up from a chair, do " some squeezes to decrease that urge you're having with standing.      Bahmans    Do these in sitting and standing - do squeeze/relax and do holds (progress yourself with how long you're holding)    1. Lie on your back with your knees bent, comfortably with legs and buttocks relaxed.  2. Squeeze and LIFT the muscles that stop the flow of urine and passage of gas. Keep legs and buttock muscles quiet/relaxed.  3. Hold lift for 3 seconds without holding breath (you may exhale the entire time if needed).  4. Release and DROP the pelvic floor muscles for 10 seconds, making sure they relax fully and trying to feel them drop.  5. Repeat 10 times, 3 sets per day. Spread throughout the day if you would like.

## 2022-01-11 NOTE — PROGRESS NOTES
Pelvic Health Physical Therapy   Treatment Note     Name: Juanita Walton  Clinic Number: [id number]    Therapy Diagnosis:   Encounter Diagnoses   Name Primary?    Constipation, unspecified constipation type Yes    Pelvic floor weakness     Urinary frequency      Physician: Finesse Hermosillo,*    Visit Date: 1/11/2022    Physician Orders: PT Eval and Treat  Medical Diagnosis from Referral: Pelvic floor weakness in female [N81.89]  Evaluation Date: 10/11/2021  Authorization Period Expiration: 12/31/2022  Plan of Care Expiration: 01/09/2022  Progress Note Due: 12/15/2021  Visit # / Visits Authorized: 1/20 (9 completed in 2021)  Cancelled Visits: 0  No Show Visits: 0  FOTO: 3     Precautions: universal, HTN, colitis    Time In: 9:11 am  Time Out: 9:54 am  Total Billable Time: 43 minutes    Subjective     Pt reports today: Pt reports she did have a little bit of leakage since last time she saw me - if it has been 2 hours since last void and she has a strong urge she does have some leakage - a few drops. It is still her first urge, though. If she gets up and starts moving around and bending over it gets really strong. Adhered to her HEP - made a routine for herself. Still doing well in healthy back. No longer constipated.    She was compliant with home exercise program.  Response to previous treatment: decreased urinary urgency  Functional change: decreased urinary urgency with ADLs    Pain Pre-Treatment: 0/10  Pain Post-Treatment: 0/10  Location: N/A    Constitutional Symptoms Review: The patient denies having any constitutional symptoms.     Objective       Juanita received therapeutic exercises to develop strength, endurance and core stabilization for 10 minutes including:     TrA activation with exhale in hooklying,  x 10    Deferred:  TrA activation with exhale and bent knee fallout (not alt) 1 x 10 B  TrA activation with exhale and marching (not alt) 1 x 5 B  Kegels with exhale in hooklying focusing on  feeling full relax x 5  Kegels with exhale in sitting on exercise ball for feedback with focus on feeling full relax, 1 x 5 and 1 x 10 regular, then 1 x 10 with   Bear down x 10 with exercise ball for feedback - mod cues for exhale and widening sit bones  Kegels in modified plantigrade x 15 with focus on feeling both the squeeze and full relax - pt reporting awareness of both  Kegels in standing x 15 with focus on feeling both the squeeze and full relax - pt reporting it took a few reps but she can feel it  Tabletop holds with exhale and TrA contraction, 4 x 3 second holds - performed 4x  Kegel with exhale + TrA contraction x 5 - pt reports she could feel TrA activating, did them together on purpose and was able to perform  TrA activation with exhale in sitting; several reps - pt used kegel intermittently to assist with achieving correct contraction  Kegel with exhale in hooklying, 2 x 10 with 5 second holds  Kegels with exhale in sitting on exercise ball, 1 x 10 regular, 1 x 10 with 3 second holds  Kegels with exhale in quadruped, 1 x 10 regular, 1 x 10 with 3 second holds  TrA activation with exhale x 10 - pt with good form immediately, no cues required to cue away from obliques  Internal:  Kegel with exhale, multiple reps - pt requiring max cues and tapping for coordinating with exhale and isolating muscles  Bear down, multiple reps - pt requiring max cues including hiss and pooch to improve quality, decreased ROM with proper form      Juanita participated in neuromuscular re-education activities to develop Coordination, Control, Proprioception and Sense for 25 minutes including:     Adjusted urge control strategies through extensive discussion     Deferred:  Diaphragmatic breathing for PF awareness and relaxation x 15 - pt reporting good awareness  Reviewed urge delay strategies and when/how to use  Reviewed and performed gentle bowel massage for improved motility of large intestine for decreased constipation  and urinary urgency  Child's pose with 10 diaphragmatic breaths x 10 for PF relaxation and ROM - pt reports she can feel it  Reviewed urinary urge control strategies and modifications for decreased urge with standing from seated position  Performed bowel massage for improved colonic motility and decreased constipation - reviewed using teach-back method  Internal:  Performed reassessment of PF coordination  Diaphragmatic breathing x 10 for PF relaxation and awareness - pt with increased chest expansion today, decreased with cueing  Kegel with exhale x several reps - pt performing squeeze on inhale, required extensive cueing to pair with correct breath  Bear down x 1 - pt with good form, exhaling correctly, mild decreased ROM  Diaphragmatic breathing for PF relaxation and awareness - pt requiring max cues for proper form and max cueing for movement of PFM        Juanita participated in dynamic functional therapeutic activities to improve functional performance for 8 minutes, including: Education as described below.        Home Exercises Provided and Patient Education Provided     Education Provided:   - bladder irritants and bladder retraining  Discussed progression of plan of care with patient; educated pt in activity modification; reviewed HEP with pt. Pt demonstrated and verbalized understanding of all instruction and was provided with a handout of HEP (see Patient Instructions).  - Want to increase layer 2 and layer 3 for urgency and to prevent leakage  - All potential causes of urinary urgency  - Can go to urologist if detrusor is the issue - med  - Staying calm is the key to stopping the urge  - Blow out and squeeze in PF with hardest phase of exercise  Deferred:  - Reviewed how to determine number of reps and how long to hold  - How the muscles are moving with diaphragmatic breathing  - Reviewed strategies for constipation management and discussed new bowel massage, fiber for bulking if needed, bowel likes  routine (don't change things)  - Need to keep up with HEP for maintenance of progress made  - Reviewed performance of kegels and specifics on how to improve endurance and strength, how to progress, etc.  - Extensive discussion on how to bear down for bowel movement - blow out, letting air out of stomach and draw in lower abdomen with PF relaxed  - How to know how long to hold for - challenging but doable  - Why relaxing fully between each rep is very important  - Blow out with kegel, blow out with bear down and work at bear down, kegels in different positions, how to know how long to hold, types of kegels to perform, TrA stuff  - Performing kegels in different positions  - Bearing down with exhale - used ball for feedback  - Remaining goals - improve strength, endurance  - Want to get kegel and TrA separately but it is okay to use one to help activate the other at first  - Improved kegel form, blowing out with kegel  - Strengthening + urinary urge control strategies improve holding of urine  - TrA and kegel will work together functionally      Written Home Exercises Provided: yes.  Exercises were reviewed and Juanita was able to demonstrate them prior to the end of the session. Juanita demonstrated good  understanding of the education provided.     See EMR under Patient Instructions for exercises provided 01/11/2022.    Assessment     Pt with excellent tolerance for treatment today, demonstrating improved form with diaphragmatic breathing and TrA activation. Pt continues to report some minor urinary leakage when holding past 2 hours. Adjusted urinary urge suppression process extensively today and will assess impacts at next visit. Will also reassess kegel. Will continue to progress as tolerated per POC.    Juanita is progressing well towards her goals.   Pt prognosis is Good.     Pt will continue to benefit from skilled outpatient physical therapy to address the deficits listed in the problem list box on initial  evaluation, provide pt/family education and to maximize pt's level of independence in the home and community environment.     Pt's spiritual, cultural and educational needs considered and pt agreeable to plan of care and goals.     Anticipated barriers to physical therapy: colitis    Goals:  Short Term Goals: 6 weeks   - Pt will be I in diaphragmatic breathing with proper technique to promote relaxation and pelvic floor functional mobility for improved urinary continence with ADLs. - PARTIALLY MET 12/20  - Pt to increase pelvic floor strength to at least 3/5 to demonstrate improved strength needed for continence with ADLs. - MET 11/15  - Pt to be able to perform a 5 second kegel x 10 reps with good quality to demonstrate improving strength and endurance needed for continence. - MET 11/22  - Pt to voice understanding of the role that diet plays on urinary urgency. - MET 11/15  - Pt will be able to correctly and consistently explain urge control strategies to demonstrate understanding of these strategies, decrease likelihood of leakage, and increase time between voids. - MET 11/15  - Pt will report a 50% reduction in frequency of leakage to demonstrate improved pelvic floor coordination needed for continence with ADLs. - MET 11/15  - Pt to demonstrate proper positioning on commode with breathing techniques to decrease strain with BM to enable pt to feel empty after BM. - MET 11/15  - Pt to demonstrate independence with performing bowel massage to help with gut motility. - MET 11/15     Long Term Goals: 12 weeks   - Pt to be I with home plan for carry over after discharge. - PARTIALLY MET 12/20  - Pt to increase pelvic floor strength to at least 4/5 to demonstrate improved strength needed for continence with ADLs. - NOT MET 12/20  - Pt to be able to perform a 10 second kegel x 10 reps with good quality to demonstrate improving strength and endurance needed for continence. - NOT MET 11/13 - NOT ASSESSED 12/20  - Pt to  report elimination of incontinence with ADLs to demonstrate improved pelvic floor muscle strength and coordination. - PARTIALLY MET 12/20  - Pt to report being able to have a BM without straining 90% of the time to demonstrate improving PF coordination. - PARTIALLY MET 12/20  - Pt to demonstrate an improved score in the FOTO Urinary Problem survey to less than 33% to demonstrate improving pelvic floor function for improved urinary continence with ADLs. - NOT MET 11/15 - NOT ASSESSED 12/20    Plan     Continue per established POC as tolerated.    Luciana Katz, PT, DPT

## 2022-01-12 ENCOUNTER — CLINICAL SUPPORT (OUTPATIENT)
Dept: REHABILITATION | Facility: HOSPITAL | Age: 67
End: 2022-01-12
Payer: MEDICARE

## 2022-01-12 DIAGNOSIS — R53.1 DECREASED STRENGTH: ICD-10-CM

## 2022-01-12 PROCEDURE — 97110 THERAPEUTIC EXERCISES: CPT

## 2022-01-12 NOTE — PLAN OF CARE
Outpatient Therapy Updated Plan of Care     Visit Date: 1/12/2022  Name: Juanita Walton  Clinic Number: [id number]    Therapy Diagnosis:   Encounter Diagnosis   Name Primary?    Decreased strength      Physician: Finesse Hermosillo,*    Physician Orders: PT Eval and Treat   Medical Diagnosis from Referral: Chronic left-sided low back pain without sciatica  Evaluation Date: 9/17/2021    Total Visits Received: 15  Cancelled Visits: 4  No Show Visits: 0    Current Certification Period:  1/12/22 to 2/12/22  Precautions:  Standard  Visits from Evaluation Date:  15  Functional Level Prior to Evaluation:  IND    Subjective     Update: Patient is still continuing to do well with her exercises and her back has been feeling good.    Objective     Update:     MOVEMENT LOSS     ROM Loss   Flexion WNL   Extension WNL   Side bending Right WNL   Side bending Left WNL   Rotation Right WNL   Rotation Left WNL      Lower Extremity Strength  Right LE   Left LE     Hip flexion: 4/5 Hip flexion: 4/5   Hip extension:  4/5 Hip extension: 4/5   Hip abduction: 4/5 Hip abduction: 4/5   Hip adduction:  4/5 Hip adduction:  4/5   Hip Internal rotation    4/5 Hip Internal rotation 4/5   Knee Flexion 4+/5 Knee Flexion 4+/5   Knee Extension 4+/5 Knee Extension 4+/5   Ankle dorsiflexion: 4+/5 Ankle dorsiflexion: 4+/5   Ankle plantarflexion: 4+/5 Ankle plantarflexion: 4+/5      GAIT:  Assistive Device used: none  Level of Assistance: independent  Patient displays the following gait deviations:  no gait deviations observed.      Special Tests:   Test Name  Test Result   Prone Instability Test (-)   SI Joint Provocation Test (--)   Straight Leg Raise (--)   Neural Tension Test (--)   Crossed Straight Leg Raise (--)   Walking on toes (--)   Walking on heels  (--)     Assessment     Update: Patient has demonstrated great improvement in ROM, strength, motor control, tolerance to activity, and improvement in pain/adverse symptoms.    Short term  goals:  6 weeks or 10 visits   1.  Pt will demonstrate increased lumbar ROM by at least 6 degrees from the initial ROM value with improvements noted in functional ROM and ability to perform ADLs.  MET  2.  Pt will demonstrate increased MedX average isometric strength value  by 20% from initial test resulting in improved ability to perform bending, lifting, and carrying activities safely, confidently.  MET  3.  Patient report a reduction in worst pain score by 1-2 points for improved tolerance for lifting.  MET  4.  Pt able to perform HEP correctly with minimal cueing or supervision from therapist to encourage independent management of symptoms. MET     Long term goals: 10 weeks or 20 visits   1. Pt will demonstrate increased lumbar ROM by at least 12 degrees from initial ROM value, resulting in improved ability to perform functional fwd bending while standing and sitting. MET  2. Pt will demonstrate increased MedX average isometric strength value by 40% from initial test resulting in improved ability to perform bending, lifting, and carrying activities safely, confidently.  (approp and ongoing)  3. Pt to demonstrate ability to independently control and reduce their pain through posture positioning and mechanical movements throughout a typical day. MET  4.  Pt will demonstrate reduced pain and improved functional outcomes as reported on the FOTO by reaching a limitation score of < or = 10% or less in order to demonstrate subjective improvement in pt's condition.    (approp and ongoing)  5. Pt will demonstrate independence with the HEP at discharge  (approp and ongoing)  6.  Patient will be able to perform lifting and cleaning without any increase in adverse symptoms.  (approp and ongoing)    Reasons for Recertification of Therapy:   Patient is almost ready for discharge ad to be independent with HEP, but need 1-2 more visits on her own to insure she can manage any flare ups. Patient needs continued skilled therapy in  order to improve IND with HEP, improve quality of lifting, and IND with flare ups in order to improve quality of life and return to PLOF.    Plan     Updated Certification Period: 1/12/2022 to 2/12/22  Recommended Treatment Plan: 0-1 times per week for 4 weeks: Manual Therapy, Patient Education and Therapeutic Exercise  Other Recommendations: Discharging in the next 1-2 visits    Jesus Parks, PT  1/12/2022      I CERTIFY THE NEED FOR THESE SERVICES FURNISHED UNDER THIS PLAN OF TREATMENT AND WHILE UNDER MY CARE    Physician's comments:        Physician's Signature: ___________________________________________________

## 2022-01-12 NOTE — PROGRESS NOTES
[facility]      Name: Juanita Walton  Clinic Number: [id number]    Therapy Diagnosis:   Encounter Diagnosis   Name Primary?    Decreased strength      Physician: Finesse Hermosillo,*    Visit Date: 2022    Physician Orders: PT Eval and Treat   Medical Diagnosis from Referral: Chronic left-sided low back pain without sciatica  Evaluation Date: 2021  Authorization Period Expiration: 22  Plan of Care Expiration: 22  Reassessment Due: 10th visit- done  Visit # / Visits authorized: 15/ 20    Time In: 7:40 am  Time Out: 8:40 am  Total Billable Time: 55 min     Precautions: RA    Pattern of pain determined: 2 PEN    Subjective   Juanita reports that she has been feeling good and is starting to do swimming as well for sports.     Patient reports tolerating previous visit good.  Patient reports their pain to be 0/10 on a 0-10 scale with 0 being no pain and 10 being the worst pain imaginable.  Pain Location: lower back     Work and leisure: increased pain  Pt goals: decrease pain, improve exercising    Objective     Baseline IM Testing Results:   Date of testin21  ROM 42 deg   Max Peak Torque 142    Min Peak Torque 60    Flex/Ext Ratio 0/42   % below normative data 7     Date of testin21- most likely patient used legs too much during initial evaluation  ROM 54 deg   Max Peak Torque 117   Min Peak Torque 55   Flex/Ext Ratio 0/54   % below normative data 15     Outcomes: FOTO  Initial score: 33%    Visit 20 score:    Treatment    Pt was instructed in and performed the following:     Juanita received therapeutic exercises to develop/improved posture, cardiovascular endurance, muscular endurance, lumbar/cervical ROM, strength and muscular endurance for 60 minutes including the following exercises:     Nustep x 6 min for joint nutrition  Thread the needle  Child's pose with focus on breathing  SL bridge 3 second hold x 15 reps each side  Dead bugs with  leg lower x 20 reps  14# ball deadlifts x 20 reps with breaks  Seated overhead press 15# ball 3 x 6 reps  Sled + 25# x 3 laps  LTR with feet up x 10 reps  Torso rotation 24# x 15  Sidelying open books    Pt and therapist focused on proper form during treatment to ensure optimal strengthening of each targeted muscle group.     Juanita received the following manual therapy techniques: Joint mobilizations and Soft tissue Mobilization were applied to the: lumbar spine for 0 minutes.     Home Exercises Provided and Patient Education Provided   Home exercises include: see in wrap up  Cardio program: performs walking  Lifting education date: Visit 8  Lumbar roll: Not needed    Education provided:   - Progression of rehab    Written Home Exercises Provided: yes.  Exercises were reviewed and Juanita was able to demonstrate them prior to the end of the session.  Juanita demonstrated good  understanding of the education provided.     See EMR under Patient Instructions for exercises provided prior visit.    Assessment   See in UPOC    Patient is making good progress towards established goals.  Pt will continue to benefit from skilled outpatient physical therapy to address the deficits stated in the impairment chart, provide pt/family education and to maximize pt's level of independence in the home and community environment.     Anticipated Barriers for therapy:  none  Pt's spiritual, cultural and educational needs considered and pt agreeable to plan of care and goals as stated below:     Goals:     Short term goals:  6 weeks or 10 visits   1.  Pt will demonstrate increased lumbar ROM by at least 6 degrees from the initial ROM value with improvements noted in functional ROM and ability to perform ADLs.  MET  2.  Pt will demonstrate increased MedX average isometric strength value  by 20% from initial test resulting in improved ability to perform bending, lifting, and carrying activities safely, confidently.  MET  3.  Patient  report a reduction in worst pain score by 1-2 points for improved tolerance for lifting.  MET  4.  Pt able to perform HEP correctly with minimal cueing or supervision from therapist to encourage independent management of symptoms. MET     Long term goals: 10 weeks or 20 visits   1. Pt will demonstrate increased lumbar ROM by at least 12 degrees from initial ROM value, resulting in improved ability to perform functional fwd bending while standing and sitting. MET  2. Pt will demonstrate increased MedX average isometric strength value by 40% from initial test resulting in improved ability to perform bending, lifting, and carrying activities safely, confidently.  (approp and ongoing)  3. Pt to demonstrate ability to independently control and reduce their pain through posture positioning and mechanical movements throughout a typical day. MET  4.  Pt will demonstrate reduced pain and improved functional outcomes as reported on the FOTO by reaching a limitation score of < or = 10% or less in order to demonstrate subjective improvement in pt's condition.    (approp and ongoing)  5. Pt will demonstrate independence with the HEP at discharge  (approp and ongoing)  6.  Patient will be able to perform lifting and cleaning without any increase in adverse symptoms.  (approp and ongoing)    Plan   Continue with established Plan of Care towards established PT goals.

## 2022-01-18 ENCOUNTER — CLINICAL SUPPORT (OUTPATIENT)
Dept: REHABILITATION | Facility: HOSPITAL | Age: 67
End: 2022-01-18
Payer: MEDICARE

## 2022-01-18 ENCOUNTER — PATIENT MESSAGE (OUTPATIENT)
Dept: REHABILITATION | Facility: HOSPITAL | Age: 67
End: 2022-01-18

## 2022-01-18 DIAGNOSIS — R35.0 URINARY FREQUENCY: ICD-10-CM

## 2022-01-18 DIAGNOSIS — N81.89 PELVIC FLOOR WEAKNESS: ICD-10-CM

## 2022-01-18 DIAGNOSIS — K59.00 CONSTIPATION, UNSPECIFIED CONSTIPATION TYPE: Primary | ICD-10-CM

## 2022-01-18 PROCEDURE — 97112 NEUROMUSCULAR REEDUCATION: CPT

## 2022-01-18 PROCEDURE — 97530 THERAPEUTIC ACTIVITIES: CPT

## 2022-01-18 NOTE — PLAN OF CARE
[facility]  Pelvic Health Physical Therapy Updated Plan of Care Note    Name: Juanita Walton  Clinic Number: [id number]    Therapy Diagnosis:   Encounter Diagnoses   Name Primary?    Constipation, unspecified constipation type Yes    Pelvic floor weakness     Urinary frequency      Physician: Finesse Hermosillo,*    Visit Date: 1/18/2022    Physician Orders: PT Eval and Treat  Medical Diagnosis from Referral: Pelvic floor weakness in female [N81.89]  Evaluation Date: 10/11/2021  Authorization Period Expiration: 12/31/2022  Plan of Care Expiration: 04/18/2022  Progress Note Due: 02/17/2022  Visit # / Visits Authorized: 2/20 (9 completed in 2021)  Cancelled Visits: 0  No Show Visits: 0  FOTO: 3     Precautions: universal, HTN, colitis  Functional Level Prior to Evaluation: urinary frequency, constipation    SUBJECTIVE     Update: Pt reports today: Pt reports she is having a good week so far. Pt reports she has started taking her BP med later in the day and she is having fewer issues with urgency because she is in a more controlled environment. Pt reports she was able to make most of the changes with the urge control strategies. Has not really had to use it much due to the change in med assisting with decrease in urgency when she is out and about.  Pt reports she has been having some loose stool and she is concerned about her ulcerative colitis because she does not want to end up in the hospital. Reports having had loose stool several times over the years and has been able to manage with diet. Denies abdominal pain. Wants to ask GI about her meds    OBJECTIVE     Update:    FOTO Urinary Problem Survey:  Limitation Score at Eval: 33%  At Update 1 on 11/15/2021: 31%  Today (1/18/2022): 21%  Expected at Discharge: 27%    Bladder History:  Frequency of urination:   Daytime: every 2-2.5 hours; at update 1: every 1-2 hours; at eval: every 30 min to an hour - ever since she started taking BP  meds (since second baby 34 years ago); pt thinks it depends on her fluid and food intake   Nighttime: none (asked due to change in med)  Urinary urgency: Yes, able to hold for about 2 hours after first urge; at update 1: Yes, able to hold for about an hour after first urge before this occurs; at eval: Yes, Able to delay the urge for at least 30 minute(s)  Bladder leakage: Yes, if she holds for over 2-2.5 hours and she was pulling in the parking lot; at update 1: Yes if she holds for an hour past first urge; at eval: Yes, if she has to hold past 30 min  Frequency of incidents: 1x per week to every other week; at update 1: 1x per week; at eval: 2x per week  Amount leaked (urine): few drops; at update 1: few drops; at eval: few drop     Bowel History:  Frequency of bowel movements: every morning; at update 1: every other day to every third day (will have more than one BM if she waits every third day); at eval: pt reports it depends on her diet, can be every day (usually) or every 2-3 days if constipated  Quality/Shape of BM: [place] Stool Chart type 4 or 5/6 (relates this to diet); at eval: [place] Stool Chart 4  Difficulty initiating BM: 20% of the time; at update 1: Less straining than at eval; at eval: Yes  OTC medication/supplementation? Activia when stool is not loose; at update 1: Started back eating Activia, reports stool is softer now; at eval: No    ASSESSMENT     Update: Pt with excellent tolerance for treatment today, continuing to report decreased urinary urgency and urinary leakage though some still persists when holding past 2-2.5 hours. Pt reporting good adherence to HEP over last week and reports improvement from changing her BP med timing. At next visit will reassess kegel and PF coordination vaginally. Overall, pt has made excellent improvements over the course of therapy but has yet to reach full functional potential and will continue to benefit from skilled therapy to address remaining long-term  goals.    Previous Short Term Goals Status: 8/8 met  New Short Term Goals Status: N/A  Long Term Goal Status: continue per initial plan of care.  Reasons for Recertification of Therapy: Pt is making progress with therapy but has not yet met her goals.    Goals:  Short Term Goals: 6 weeks   - Pt will be I in diaphragmatic breathing with proper technique to promote relaxation and pelvic floor functional mobility for improved urinary continence with ADLs. - MET 01/18  - Pt to increase pelvic floor strength to at least 3/5 to demonstrate improved strength needed for continence with ADLs. - MET 11/15  - Pt to be able to perform a 5 second kegel x 10 reps with good quality to demonstrate improving strength and endurance needed for continence. - MET 11/22  - Pt to voice understanding of the role that diet plays on urinary urgency. - MET 11/15  - Pt will be able to correctly and consistently explain urge control strategies to demonstrate understanding of these strategies, decrease likelihood of leakage, and increase time between voids. - MET 11/15  - Pt will report a 50% reduction in frequency of leakage to demonstrate improved pelvic floor coordination needed for continence with ADLs. - MET 11/15  - Pt to demonstrate proper positioning on commode with breathing techniques to decrease strain with BM to enable pt to feel empty after BM. - MET 11/15  - Pt to demonstrate independence with performing bowel massage to help with gut motility. - MET 11/15     Long Term Goals: 12 weeks   - Pt to be I with home plan for carry over after discharge. - PARTIALLY MET 01/18  - Pt to increase pelvic floor strength to at least 4/5 to demonstrate improved strength needed for continence with ADLs. - NOT ASSESSED 01/18  - Pt to be able to perform a 10 second kegel x 10 reps with good quality to demonstrate improving strength and endurance needed for continence. - NOT ASSESSED 01/18  - Pt to report elimination of incontinence with ADLs to  demonstrate improved pelvic floor muscle strength and coordination. - PARTIALLY MET 01/18  - Pt to report being able to have a BM without straining 90% of the time to demonstrate improving PF coordination. - PARTIALLY MET 01/18  - Pt to demonstrate an improved score in the FOTO Urinary Problem survey to 27% or less to demonstrate improving pelvic floor function for improved urinary continence with ADLs. - goal edited 01/18; MET 01/18    PLAN     Updated Certification Period: 01/18/2022 to 04/18/2022   Recommended Treatment Plan: 1-2x times per week for 12 weeks: therapeutic exercises, therapeutic activity, neuromuscular re-education, manual therapy, modalities PRN, patient/family education and self care/home management  Other Recommendations: N/A    Luciana Katz, PT, DPT    I CERTIFY THE NEED FOR THESE SERVICES FURNISHED UNDER THIS PLAN OF TREATMENT AND WHILE UNDER MY CARE  Physician's comments:      Physician's Signature: ___________________________________________________

## 2022-01-18 NOTE — PROGRESS NOTES
Pelvic Health Physical Therapy   Treatment Note     Name: Juanita Walton  Clinic Number: [id number]    Therapy Diagnosis:   Encounter Diagnoses   Name Primary?    Constipation, unspecified constipation type Yes    Pelvic floor weakness     Urinary frequency      Physician: Finesse Hermosillo,*    Visit Date: 1/18/2022    Physician Orders: PT Eval and Treat  Medical Diagnosis from Referral: Pelvic floor weakness in female [N81.89]  Evaluation Date: 10/11/2021  Authorization Period Expiration: 12/31/2022  Plan of Care Expiration: 04/18/2022  Progress Note Due: 02/17/2022  Visit # / Visits Authorized: 2/20 (9 completed in 2021)  Cancelled Visits: 0  No Show Visits: 0  FOTO: 3     Precautions: universal, HTN, colitis    Time In: 1:35 pm  Time Out: 2:34 pm  Total Billable Time: 59 minutes    Subjective     Pt reports today: Pt reports she is having a good week so far. Pt reports she has started taking her BP med later in the day and she is having fewer issues with urgency because she is in a more controlled environment. Pt reports she was able to make most of the changes with the urge control strategies. Has not really had to use it much due to the change in med assisting with decrease in urgency when she is out and about.  Pt reports she has been having some loose stool and she is concerned about her ulcerative colitis because she does not want to end up in the hospital. Reports having had loose stool several times over the years and has been able to manage with diet. Denies abdominal pain. Wants to ask GI about her meds.    She was compliant with home exercise program.  Response to previous treatment: decreased urinary urgency  Functional change: decreased urinary urgency with ADLs    Pain Pre-Treatment: 0/10  Pain Post-Treatment: 0/10  Location: N/A    Constitutional Symptoms Review: The patient denies having any constitutional symptoms.     Objective     FOTO Urinary Problem Survey:  Limitation Score at Eval:  33%  At Update 1 on 11/15/2021: 31%  Today (1/18/2022): 21%  Expected at Discharge: 27%    Bladder History:  Frequency of urination:   Daytime: every 2-2.5 hours; at update 1: every 1-2 hours; at eval: every 30 min to an hour - ever since she started taking BP meds (since second baby 34 years ago); pt thinks it depends on her fluid and food intake   Nighttime: none (asked due to change in med)  Urinary urgency: Yes, able to hold for about 2 hours after first urge; at update 1: Yes, able to hold for about an hour after first urge before this occurs; at eval: Yes, Able to delay the urge for at least 30 minute(s)  Bladder leakage: Yes, if she holds for over 2-2.5 hours and she was pulling in the parking lot; at update 1: Yes if she holds for an hour past first urge; at eval: Yes, if she has to hold past 30 min  Frequency of incidents: 1x per week to every other week; at update 1: 1x per week; at eval: 2x per week  Amount leaked (urine): few drops; at update 1: few drops; at eval: few drop     Bowel History:  Frequency of bowel movements: every morning; at update 1: every other day to every third day (will have more than one BM if she waits every third day); at eval: pt reports it depends on her diet, can be every day (usually) or every 2-3 days if constipated  Quality/Shape of BM: [place] Stool Chart type 4 or 5/6 (relates this to diet); at eval: [place] Stool Chart 4  Difficulty initiating BM: 20% of the time; at update 1: Less straining than at eval; at eval: Yes  OTC medication/supplementation? Activia when stool is not loose; at update 1: Started back eating Activia, reports stool is softer now; at eval: No      Juanita received therapeutic exercises to develop strength, endurance and core stabilization for 0 minutes including:     Deferred:  TrA activation with exhale in hooklying x 10  TrA activation with exhale and bent knee fallout (not alt) 1 x 10 B  TrA activation with exhale and marching (not alt) 1 x 5  B  Kegels with exhale in hooklying focusing on feeling full relax x 5  Kegels with exhale in sitting on exercise ball for feedback with focus on feeling full relax, 1 x 5 and 1 x 10 regular, then 1 x 10 with   Bear down x 10 with exercise ball for feedback - mod cues for exhale and widening sit bones  Kegels in modified plantigrade x 15 with focus on feeling both the squeeze and full relax - pt reporting awareness of both  Kegels in standing x 15 with focus on feeling both the squeeze and full relax - pt reporting it took a few reps but she can feel it  Tabletop holds with exhale and TrA contraction, 4 x 3 second holds - performed 4x  Kegel with exhale + TrA contraction x 5 - pt reports she could feel TrA activating, did them together on purpose and was able to perform  TrA activation with exhale in sitting; several reps - pt used kegel intermittently to assist with achieving correct contraction  Kegel with exhale in hooklying, 2 x 10 with 5 second holds  Kegels with exhale in sitting on exercise ball, 1 x 10 regular, 1 x 10 with 3 second holds  Kegels with exhale in quadruped, 1 x 10 regular, 1 x 10 with 3 second holds  TrA activation with exhale x 10 - pt with good form immediately, no cues required to cue away from obliques  Internal:  Kegel with exhale, multiple reps - pt requiring max cues and tapping for coordinating with exhale and isolating muscles  Bear down, multiple reps - pt requiring max cues including hiss and pooch to improve quality, decreased ROM with proper form      Juanita participated in neuromuscular re-education activities to develop Coordination, Control, Proprioception and Sense for 35 minutes including:     Performed reassessment of PF coordination through symptom report  Added to new urge control strategies    Deferred:  Diaphragmatic breathing for PF awareness and relaxation x 15 - pt reporting good awareness  Reviewed urge delay strategies and when/how to use  Reviewed and performed  gentle bowel massage for improved motility of large intestine for decreased constipation and urinary urgency  Child's pose with 10 diaphragmatic breaths x 10 for PF relaxation and ROM - pt reports she can feel it  Reviewed urinary urge control strategies and modifications for decreased urge with standing from seated position  Performed bowel massage for improved colonic motility and decreased constipation - reviewed using teach-back method  Internal:  Performed reassessment of PF coordination  Diaphragmatic breathing x 10 for PF relaxation and awareness - pt with increased chest expansion today, decreased with cueing  Kegel with exhale x several reps - pt performing squeeze on inhale, required extensive cueing to pair with correct breath  Bear down x 1 - pt with good form, exhaling correctly, mild decreased ROM  Diaphragmatic breathing for PF relaxation and awareness - pt requiring max cues for proper form and max cueing for movement of PFM        Juanita participated in dynamic functional therapeutic activities to improve functional performance for 24 minutes, including: Education as described below.        Home Exercises Provided and Patient Education Provided     Education Provided:   - bladder irritants and bladder retraining  Discussed progression of plan of care with patient; educated pt in activity modification; reviewed HEP with pt. Pt demonstrated and verbalized understanding of all instruction and was provided with a handout of HEP (see Patient Instructions).  - Practicing urge delay strategies in front of triggers  - Reviewed new urinary urge control strategies  - No barriers to continued progress  - Discussed how to phrase questions about colitis meds to GI doctor  - Staying calm is the key to stopping the urge  - Reviewed HEP  - Types of fiber and use for each  - Bowel loves routine - stick with what is working for you    Deferred:  - Want to increase layer 2 and layer 3 for urgency and to prevent  leakage  - All potential causes of urinary urgency  - Can go to urologist if detrusor is the issue - med  - Blow out and squeeze in PF with hardest phase of exercise  - Reviewed how to determine number of reps and how long to hold  - How the muscles are moving with diaphragmatic breathing  - Reviewed strategies for constipation management and discussed new bowel massage, fiber for bulking if needed, bowel likes routine (don't change things)  - Need to keep up with HEP for maintenance of progress made  - Reviewed performance of kegels and specifics on how to improve endurance and strength, how to progress, etc.  - Extensive discussion on how to bear down for bowel movement - blow out, letting air out of stomach and draw in lower abdomen with PF relaxed  - How to know how long to hold for - challenging but doable  - Why relaxing fully between each rep is very important  - Blow out with kegel, blow out with bear down and work at bear down, kegels in different positions, how to know how long to hold, types of kegels to perform, TrA stuff  - Performing kegels in different positions  - Bearing down with exhale - used ball for feedback  - Remaining goals - improve strength, endurance  - Want to get kegel and TrA separately but it is okay to use one to help activate the other at first  - Improved kegel form, blowing out with kegel  - Strengthening + urinary urge control strategies improve holding of urine  - TrA and kegel will work together functionally      Written Home Exercises Provided: yes.  Exercises were reviewed and Juanita was able to demonstrate them prior to the end of the session. Juanita demonstrated good  understanding of the education provided.     See EMR under Patient Instructions for exercises provided 01/18/2022.    Assessment     Pt with excellent tolerance for treatment today, continuing to report decreased urinary urgency and urinary leakage though some still persists when holding past 2-2.5 hours. Pt  reporting good adherence to HEP over last week and reports improvement from changing her BP med timing. At next visit will reassess kegel and PF coordination vaginally. Overall, pt has made excellent improvements over the course of therapy but has yet to reach full functional potential and will continue to benefit from skilled therapy to address remaining long-term goals.    Juanita is progressing well towards her goals.   Pt prognosis is Good.     Pt will continue to benefit from skilled outpatient physical therapy to address the deficits listed in the problem list box on initial evaluation, provide pt/family education and to maximize pt's level of independence in the home and community environment.     Pt's spiritual, cultural and educational needs considered and pt agreeable to plan of care and goals.     Anticipated barriers to physical therapy: colitis    Goals:  Short Term Goals: 6 weeks   - Pt will be I in diaphragmatic breathing with proper technique to promote relaxation and pelvic floor functional mobility for improved urinary continence with ADLs. - MET 01/18  - Pt to increase pelvic floor strength to at least 3/5 to demonstrate improved strength needed for continence with ADLs. - MET 11/15  - Pt to be able to perform a 5 second kegel x 10 reps with good quality to demonstrate improving strength and endurance needed for continence. - MET 11/22  - Pt to voice understanding of the role that diet plays on urinary urgency. - MET 11/15  - Pt will be able to correctly and consistently explain urge control strategies to demonstrate understanding of these strategies, decrease likelihood of leakage, and increase time between voids. - MET 11/15  - Pt will report a 50% reduction in frequency of leakage to demonstrate improved pelvic floor coordination needed for continence with ADLs. - MET 11/15  - Pt to demonstrate proper positioning on commode with breathing techniques to decrease strain with BM to enable pt to  feel empty after BM. - MET 11/15  - Pt to demonstrate independence with performing bowel massage to help with gut motility. - MET 11/15     Long Term Goals: 12 weeks   - Pt to be I with home plan for carry over after discharge. - PARTIALLY MET 01/18  - Pt to increase pelvic floor strength to at least 4/5 to demonstrate improved strength needed for continence with ADLs. - NOT ASSESSED 01/18  - Pt to be able to perform a 10 second kegel x 10 reps with good quality to demonstrate improving strength and endurance needed for continence. - NOT ASSESSED 01/18  - Pt to report elimination of incontinence with ADLs to demonstrate improved pelvic floor muscle strength and coordination. - PARTIALLY MET 01/18  - Pt to report being able to have a BM without straining 90% of the time to demonstrate improving PF coordination. - PARTIALLY MET 01/18  - Pt to demonstrate an improved score in the FOTO Urinary Problem survey to 27% or less to demonstrate improving pelvic floor function for improved urinary continence with ADLs. - goal edited 01/18; MET 01/18    Plan     Continue per established POC as tolerated.    Luciana Katz, PT, DPT

## 2022-01-18 NOTE — PATIENT INSTRUCTIONS
· Soluble fiber gives stool bulk. Foods that are good sources of soluble fiber include apples, bananas, barley, oats, and beans. Google more examples of soluble fiber and increase these foods in your diet when your stool is loose. May take a day or so to see the change.    · BRAT diet (bananas, rice, applesauce, and toast - bland foods in general are good) when your stomach hurts and you are having diarrhea. Do NOT add more fiber to bulk the stool in this case.      SQUEEZE WITH THE EXHALE (aka, the kegel)!!!!   · If your urge is appropriate (I.e., your bladder is full): Use the urge control strategy to make the urge die down. Then start walking CALMLY to the bathroom. Stop as many times as needed on the way there as the urge returns. Repeat the process one more time once you get to the bathroom to ensure you are able to calmly pull down your pants. STAY CALM, use tools like your book on your phone to distract yourself.  · If your urge is not appropriate (I.e., your bladder is not full): Use the urge control strategy to delay the urge.  · In the car: stay as calm as possible! Use the radio or a podcast to distract you.    Swing both legs out of the car at the same time to avoid leakage  Practice the 3 urge control steps in front of your triggers even when you don't have the urge (I.e., go in the middle of the day to sit in your car and pull up in the driveway and practice.    · Constipation management:  · Fiber (Activia)  · Glass of warm water in morning  · Bowel massage (1-2x per day)  · Positioning - stool (knees above hips), rest upper body on the knees/thighs with your forearms  · Letting PF relax to go pee and go poop  · Bearing down (widen the sit bones and bulge the pelvic floor) and blowing out as you bear down (can draw the lower belly in and make sure you are really letting the air out)  · Movement is really helpful    · Urinary:  · Never go just in case  · Never rush to the bathroom  · We want every 2-3  hours frequency and no leakage  · Want to go when the bladder is full but need to be able to hold it if we cannot go  · Urge control strategies  · Bladder and pelvic floor work in opposites (squeeze vs. Relax)     · Pelvic Floor Muscles:  · Strength - doing kegels at a regular pace (not holding) in sitting and standing   · Endurance - doing kegels with holds (do 2 sets of 10)  · Make sure you relax in between every repetition  · Diaphragmatic breathing every night      Pelvic Floor Diaphragmatic Breathing     The diaphragm is the most efficient muscle of breathing. It is a large, dome-shaped muscle located at the base of the lungs under the bottom of your ribcage. There are many benefits to diaphragmatic (belly) breathing including:      Strengthens the diaphragm (& other accessory muscles)    Helps slow down your breathing rate and calms you    Decreases oxygen demand and uses less effort and energy to breathe    Reduces stress and anxiety, which calms your nervous system    Improves the coordination and range of motion of your pelvic floor muscles, allowing them to relax       Technique:    Place your hands on your sides under your ribcage.    Try to push your hands out as you inhale. Feel your ribcage and abdomen expand.    As you exhale, blow the air out of your body. Feel your ribcage pull back in and down and your abdomen deflate.    Perform this for 5-10 minutes on most days of the week, allowing the entire body to relax as you do.               Whenever you inhale and expand your lower ribcage and abdomen, your pelvic floor muscles will relax and push down. Whenever you exhale and deflate your ribcage and abdomen, your pelvic floor muscles will tighten slightly and pull up. Your pelvic floor muscles copy the movement of your diaphragm! Try to tune in to this movement as you breathe.        Drink a glass of warm water in the morning    Bowel Massage for Constipation    Lie on your back with  "legs in a comfortable position.     1) Start at the lower left pelvis, just next to the left hip bone. Use 2-3 fingers with flat, overlapped hands and apply downward pressure, making 10 kneading circles. Remember the "traffic jam" analogy - you must clear the cars at the front of the jam before you can move the cars in the back of the jam.     2) Next, do the same thing starting at the right lower pelvis, just next to the right hip bone. After you have made 10 circles there, move slightly upward and repeat. You will do this as you follow the colon (large intestine) up toward the ribs, then across the stomach toward the left side of the belly, then all the way down to first spot next to the left hip bone. You can see the path you should follow in the picture below.      Perform this 2x per day, morning and night.             Bowel Movement Body Mechanics    1. Sit on the toilet comfortably with legs and buttocks relaxed.  2. Put your feet on a step stool or squatty potty (8 inches tall). This helps the poop come out easier.  3. Use good "potty posture": Lean forward while keeping your back straight and rest your elbows or forearms on your knees, keeping the knees apart.  4. The pelvic floor has to fully relax for the poop to come out. Let your whole body relax, even letting the belly hang. Try to think about fully relaxing or "dropping" the pelvic floor muscles. You may want to do a few diaphragmatic breaths to help with this.  5. Exhale like you are blowing out birthday candles while you gently bear down. Do not strain or hold your breath!                URINARY URGE CONTROL   What to do when you "gotta go, gotta go"  FREEZE, BREATHE, SQUEEZE, repeat    When you experience a strong urge to urinate and know that your bladder is not as full as it has the potential to be (for instance, if you used the restroom 30 minutes to 1.5 hours ago), you should use this as a strategy to continue holding until your bladder is " "full.    You will use this strategy to incrementally increase time between voids and to avoid leaking. Continue to increase the time between going to the bathroom in increments of 5-15 minutes (whatever you are able to do). This will not happen overnight, but you will be successful using these techniques to gradually increase the time between voids.    FIRST - FREEZE: Do your best not to panic or rush to the toilet! You are more likely to leak if you do. Stop whatever you are doing, stand or sit quietly, and stay as calm as possible.    SECOND - BREATHE: Relax and take a few good deep breaths, letting it out slowly. This helps you further calm the nervous system and settles your bladder. Try thinking of something else to distract yourself from the urge (example: list categories of items like animals, fruits, cars, etc.)    THIRD - SQUEEZE: Do 5-10 "Quick Flicks" (1 second LIFT and squeeze of pelvic floor muscles, followed by a full DROP). Pelvic floor contractions send a message to the bladder to relax and hold urine. Continue doing your best to remain calm and distract yourself from the urge until it passes.    FINALLY: If the urge returns and it has not been at least 2* hours since you last went to the bathroom, repeat the above steps to regain control and return to your normal activity. If it has been 2* or more hours, you can go ahead and walk normally to the bathroom to use it. Avoid starting to undress until you are in the bathroom and ready to urinate.    If the urge returns as you walk to the bathroom and feel like you may leak on yourself: Repeat this as many times as you need to on the way to the bathroom (i.e., every time the urge comes back). We only want to be walking calmly to the bathroom once the urge has passed. When you get inside and close the door behind you, repeat this process one last time so that you have enough time to get to the toilet and pull your pants down without rushing.    Try this " "at home first in case you do have an accident, but as you continue to practice, your bladder will increase the ability its capacity and hold more urine without such a strong urge.     *Note: This time amount will vary based on your starting point; for example, if you started out going every 30 minutes, you will be using whatever your goal time is (i.e., if you are trying to increase your time between voids by 15 min, you can go once 45 minutes have passed). Our eventual goal is every 2-4 hours because that's how long it takes the bladder to fill completely, and we only want to go when the bladder is full.       Remember......"Control your bladder before it controls YOU!"    ALSO: Every time you stand up from a chair, do some squeezes to decrease that urge you're having with standing.      [place]    Do these in sitting and standing - do squeeze/relax and do holds (progress yourself with how long you're holding)    1. Lie on your back with your knees bent, comfortably with legs and buttocks relaxed.  2. Squeeze and LIFT the muscles that stop the flow of urine and passage of gas. Keep legs and buttock muscles quiet/relaxed.  3. Hold lift for 3 seconds without holding breath (you may exhale the entire time if needed).  4. Release and DROP the pelvic floor muscles for 10 seconds, making sure they relax fully and trying to feel them drop.  5. Repeat 10 times, 3 sets per day. Spread throughout the day if you would like.    "

## 2022-01-21 RX ORDER — OLMESARTAN MEDOXOMIL / AMLODIPINE BESYLATE / HYDROCHLOROTHIAZIDE 40; 10; 12.5 MG/1; MG/1; MG/1
1 TABLET, FILM COATED ORAL DAILY
Qty: 30 TABLET | Refills: 9 | Status: SHIPPED | OUTPATIENT
Start: 2022-01-21 | End: 2022-12-31

## 2022-01-21 NOTE — TELEPHONE ENCOUNTER
No new care gaps identified.  Powered by [employer] by [email or web address]. Reference number: [id number].   1/20/2022 11:25:42 PM CST

## 2022-01-24 ENCOUNTER — CLINICAL SUPPORT (OUTPATIENT)
Dept: REHABILITATION | Facility: HOSPITAL | Age: 67
End: 2022-01-24
Payer: MEDICARE

## 2022-01-24 DIAGNOSIS — R53.1 DECREASED STRENGTH: ICD-10-CM

## 2022-01-24 PROCEDURE — 97110 THERAPEUTIC EXERCISES: CPT

## 2022-01-24 NOTE — PROGRESS NOTES
[facility] Therapy Treatment      Name: Juanita Walton  Clinic Number: [id number]    Therapy Diagnosis:   Encounter Diagnosis   Name Primary?    Decreased strength      Physician: Finesse Hermosillo,*    Visit Date: 2022    Physician Orders: PT Eval and Treat   Medical Diagnosis from Referral: Chronic left-sided low back pain without sciatica  Evaluation Date: 2021  Authorization Period Expiration: 22  Plan of Care Expiration: 22  Reassessment Due: 10th visit- done  Visit # / Visits authorized:     Time In: 2:40  pm  Time Out: 3:40 pm  Total Billable Time: 50 min     Precautions: RA    Pattern of pain determined: 2 PEN    Subjective   Juanita reports that she is ready for discharge.  Patient reports tolerating previous visit good.  Patient reports their pain to be 0/10 on a 0-10 scale with 0 being no pain and 10 being the worst pain imaginable.  Pain Location: lower back     Work and leisure: increased pain  Pt goals: decrease pain, improve exercising    Objective     Baseline IM Testing Results:   Date of testin21  ROM 42 deg   Max Peak Torque 142    Min Peak Torque 60    Flex/Ext Ratio 0/42   % below normative data 7     Date of testin22  ROM 54 deg   Max Peak Torque 128   Min Peak Torque 78   Flex/Ext Ratio 0/54   % below normative data +6     Outcomes: FOTO  Initial score: 33%  Visit 20 score: 17%    Treatment    Pt was instructed in and performed the following:     Juanita received therapeutic exercises to develop/improved posture, cardiovascular endurance, muscular endurance, lumbar/cervical ROM, strength and muscular endurance for 50 minutes including the following exercises:     Nustep x 6 min for joint nutrition  Testing on machine  Thread the needle  Child's pose with focus on breathing  SL bridge 3 second hold x 15 reps each side  Dead bugs with leg lower x 20 reps  14# ball deadlifts x 20 reps with breaks  Seated overhead press 15# ball 3  x 6 reps  Torso rotation 24# x 15  Sidelying open books    Pt and therapist focused on proper form during treatment to ensure optimal strengthening of each targeted muscle group.     Juanita received the following manual therapy techniques: Joint mobilizations and Soft tissue Mobilization were applied to the: lumbar spine for 0 minutes.     Home Exercises Provided and Patient Education Provided   Home exercises include: see in wrap up  Cardio program: performs walking  Lifting education date: Visit 8  Lumbar roll: Not needed    Education provided:   - Progression of rehab    Written Home Exercises Provided: yes.  Exercises were reviewed and Juanita was able to demonstrate them prior to the end of the session.  Juanita demonstrated good  understanding of the education provided.     See EMR under Patient Instructions for exercises provided prior visit.    Assessment   Patient had made great progress in therapy and will continue to maintain/improve with HEP.    Patient is making good progress towards established goals.  Pt will continue to benefit from skilled outpatient physical therapy to address the deficits stated in the impairment chart, provide pt/family education and to maximize pt's level of independence in the home and community environment.     Anticipated Barriers for therapy:  none  Pt's spiritual, cultural and educational needs considered and pt agreeable to plan of care and goals as stated below:     Goals:     Short term goals:  6 weeks or 10 visits   1.  Pt will demonstrate increased lumbar ROM by at least 6 degrees from the initial ROM value with improvements noted in functional ROM and ability to perform ADLs.  MET  2.  Pt will demonstrate increased MedX average isometric strength value  by 20% from initial test resulting in improved ability to perform bending, lifting, and carrying activities safely, confidently.  MET  3.  Patient report a reduction in worst pain score by 1-2 points for improved  tolerance for lifting.  MET  4.  Pt able to perform HEP correctly with minimal cueing or supervision from therapist to encourage independent management of symptoms. MET     Long term goals: 10 weeks or 20 visits   1. Pt will demonstrate increased lumbar ROM by at least 12 degrees from initial ROM value, resulting in improved ability to perform functional fwd bending while standing and sitting. MET  2. Pt will demonstrate increased MedX average isometric strength value by 40% from initial test resulting in improved ability to perform bending, lifting, and carrying activities safely, confidently.  MET  3. Pt to demonstrate ability to independently control and reduce their pain through posture positioning and mechanical movements throughout a typical day. MET  4.  Pt will demonstrate reduced pain and improved functional outcomes as reported on the FOTO by reaching a limitation score of < or = 10% or less in order to demonstrate subjective improvement in pt's condition.    (approp and ongoing)  5. Pt will demonstrate independence with the HEP at discharge  MET  6.  Patient will be able to perform lifting and cleaning without any increase in adverse symptoms. MET    Plan   Continue with established Plan of Care towards established PT goals.

## 2022-01-25 DIAGNOSIS — M35.9 UNDIFFERENTIATED CONNECTIVE TISSUE DISEASE: ICD-10-CM

## 2022-01-25 DIAGNOSIS — M35.00 SICCA SYNDROME: ICD-10-CM

## 2022-01-25 RX ORDER — HYDROXYCHLOROQUINE SULFATE 200 MG/1
200 TABLET, FILM COATED ORAL DAILY
Qty: 90 TABLET | Refills: 0 | Status: SHIPPED | OUTPATIENT
Start: 2022-01-25 | End: 2022-10-04 | Stop reason: SDUPTHER

## 2022-01-25 RX ORDER — OLMESARTAN MEDOXOMIL / AMLODIPINE BESYLATE / HYDROCHLOROTHIAZIDE 40; 10; 12.5 MG/1; MG/1; MG/1
TABLET, FILM COATED ORAL
Qty: 30 TABLET | Refills: 0 | OUTPATIENT
Start: 2022-01-25

## 2022-01-25 NOTE — PLAN OF CARE
[facility]   Discharge Note    Name: Juanita Walton  Clinic Number: [id number]    Therapy Diagnosis:   Encounter Diagnosis   Name Primary?    Decreased strength      Physician: Finesse Hermosillo,*    Physician Orders: PT Eval and Treat   Medical Diagnosis from Referral: Chronic left-sided low back pain without sciatica  Evaluation Date: 9/17/2021    Date of Last visit: 1/24/22  Total Visits Received: 16    ASSESSMENT      See in daily note    Discharge reason: Patient has reached the maximum rehab potential for the present time    Discharge FOTO Score: 17%    Goals:     Short term goals:  6 weeks or 10 visits   1.  Pt will demonstrate increased lumbar ROM by at least 6 degrees from the initial ROM value with improvements noted in functional ROM and ability to perform ADLs.  MET  2.  Pt will demonstrate increased MedX average isometric strength value  by 20% from initial test resulting in improved ability to perform bending, lifting, and carrying activities safely, confidently.  MET  3.  Patient report a reduction in worst pain score by 1-2 points for improved tolerance for lifting.  MET  4.  Pt able to perform HEP correctly with minimal cueing or supervision from therapist to encourage independent management of symptoms. MET     Long term goals: 10 weeks or 20 visits   1. Pt will demonstrate increased lumbar ROM by at least 12 degrees from initial ROM value, resulting in improved ability to perform functional fwd bending while standing and sitting. MET  2. Pt will demonstrate increased MedX average isometric strength value by 40% from initial test resulting in improved ability to perform bending, lifting, and carrying activities safely, confidently.  MET  3. Pt to demonstrate ability to independently control and reduce their pain through posture positioning and mechanical movements throughout a typical day. MET  4.  Pt will demonstrate reduced pain and improved functional outcomes  as reported on the FOTO by reaching a limitation score of < or = 10% or less in order to demonstrate subjective improvement in pt's condition.    (approp and ongoing)  5. Pt will demonstrate independence with the HEP at discharge  MET  6.  Patient will be able to perform lifting and cleaning without any increase in adverse symptoms. MET    PLAN   This patient is discharged from Physical Therapy      Juan-Ana Paula Parks, PT

## 2022-01-25 NOTE — TELEPHONE ENCOUNTER
Quick DC. Request already responded to by other means (e.g. phone or fax)   Refill Authorization Note   Juanita Walton  is requesting a refill authorization.  Brief Assessment and Rationale for Refill:  Quick Discontinue  Medication Therapy Plan:       Medication Reconciliation Completed:  No      Comments:   Pended Medication(s)       Requested Prescriptions     Refused Prescriptions Disp Refills    olmesartan-amLODIPin-hcthiazid 40-10-12.5 mg Tab [Pharmacy Med Name: OLMESARTAN/AMLO/HCTZ 40-10-12.5MG T] 30 tablet 0     Sig: TAKE 1 TABLET BY MOUTH EVERY DAY     Refused By: GORDON SALAS     Reason for Refusal: Request already responded to by other means (e.g. phone or fax)        Duplicate Pended Encounter(s)/ Last Prescribed Details: (includes pharmacy & prescriber details)   [id number] - [place] - [address] AT [place] [address] [place] [address]   Phone:  [phone]  Fax:  [phone]   AISLINN #:  [id number]   MONA Reason: --       Outpatient Medication Detail     Disp Refills Start End MONA   olmesartan-amLODIPin-hcthiazid 40-10-12.5 mg Tab 30 tablet 9 1/21/2022  No   Sig - Route: Take 1 tablet by mouth once daily. - Oral   Sent to pharmacy as: olmesartan-amLODIPin-hcthiazid 40-10-12.5 mg Tab   Class: Normal   Notes to Pharmacy: ZERO refills remain on this prescription. Your patient is requesting advance approval of refills for this medication to PREVENT ANY MISSED DOSES   Order: [id number]   Date/Time Signed: 1/21/2022 11:59       E-Prescribing Status: Receipt confirmed by pharmacy (1/21/2022 12:00 PM CST)     Ordering Encounter Report    Associated Reports   View Encounter                Note composed:7:42 AM 01/25/2022

## 2022-01-27 ENCOUNTER — CLINICAL SUPPORT (OUTPATIENT)
Dept: REHABILITATION | Facility: HOSPITAL | Age: 67
End: 2022-01-27
Payer: MEDICARE

## 2022-01-27 DIAGNOSIS — K59.00 CONSTIPATION, UNSPECIFIED CONSTIPATION TYPE: Primary | ICD-10-CM

## 2022-01-27 DIAGNOSIS — N81.89 PELVIC FLOOR WEAKNESS: ICD-10-CM

## 2022-01-27 DIAGNOSIS — R35.0 URINARY FREQUENCY: ICD-10-CM

## 2022-01-27 PROCEDURE — 97530 THERAPEUTIC ACTIVITIES: CPT

## 2022-01-27 PROCEDURE — 97112 NEUROMUSCULAR REEDUCATION: CPT

## 2022-01-27 NOTE — PROGRESS NOTES
Pel[facility] Physical Therapy   Treatment Note     Name: Juanita Walton  Clinic Number: [id number]    Therapy Diagnosis:   Encounter Diagnoses   Name Primary?    Constipation, unspecified constipation type Yes    Pelvic floor weakness     Urinary frequency      Physician: Finesse Hermosillo,*    Visit Date: 1/27/2022    Physician Orders: PT Eval and Treat  Medical Diagnosis from Referral: Pelvic floor weakness in female [N81.89]  Evaluation Date: 10/11/2021  Authorization Period Expiration: 12/31/2022  Plan of Care Expiration: 04/18/2022  Progress Note Due: 02/17/2022  Visit # / Visits Authorized: 3/20 (9 completed in 2021)  Cancelled Visits: 0  No Show Visits: 0  FOTO: 3     Precautions: universal, HTN, colitis    Time In: 10:02 am  Time Out: 10:56 am  Total Billable Time: 54 minutes    Subjective     Pt reports today: She made an appointment with the GI for next week to ask some questions about meds. Has not had any urinary leakage since last time I saw her. Still taking BP med later in day which is still helping. Did get to practice urge delay strategies in front of triggers and did find that the urge popped up even though her bladder was empty - also had a lot of urge when she got into a pool last week.    She was compliant with home exercise program.  Response to previous treatment: decreased urinary urgency  Functional change: decreased urinary urgency with ADLs    Pain Pre-Treatment: 0/10  Pain Post-Treatment: 0/10  Location: N/A    Constitutional Symptoms Review: The patient denies having any constitutional symptoms.     Objective     FOTO Urinary Problem Survey:  Limitation Score at Eval: 33%  At Update 1 on 11/15/2021: 31%  At Update 2 on 01/18/2022: 21%  Today (01/27/2022): 21%  Expected at Discharge: 27%    PFM Strength: 4/5    Juanita received therapeutic exercises to develop strength, endurance and core stabilization for 10 minutes including:     Internal:  Kegel with exhale and 10 second  hold  Kegel with exhale and 5 sec hold x 5 - good relax in between each repetition    Deferred:  TrA activation with exhale in hooklying x 10  TrA activation with exhale and bent knee fallout (not alt) 1 x 10 B  TrA activation with exhale and marching (not alt) 1 x 5 B  Kegels with exhale in hooklying focusing on feeling full relax x 5  Kegels with exhale in sitting on exercise ball for feedback with focus on feeling full relax, 1 x 5 and 1 x 10 regular, then 1 x 10 with   Bear down x 10 with exercise ball for feedback - mod cues for exhale and widening sit bones  Kegels in modified plantigrade x 15 with focus on feeling both the squeeze and full relax - pt reporting awareness of both  Kegels in standing x 15 with focus on feeling both the squeeze and full relax - pt reporting it took a few reps but she can feel it  Tabletop holds with exhale and TrA contraction, 4 x 3 second holds - performed 4x  Kegel with exhale + TrA contraction x 5 - pt reports she could feel TrA activating, did them together on purpose and was able to perform  TrA activation with exhale in sitting; several reps - pt used kegel intermittently to assist with achieving correct contraction  Kegel with exhale in hooklying, 2 x 10 with 5 second holds  Kegels with exhale in sitting on exercise ball, 1 x 10 regular, 1 x 10 with 3 second holds  Kegels with exhale in quadruped, 1 x 10 regular, 1 x 10 with 3 second holds  TrA activation with exhale x 10 - pt with good form immediately, no cues required to cue away from obliques  Internal:  Kegel with exhale, multiple reps - pt requiring max cues and tapping for coordinating with exhale and isolating muscles  Bear down, multiple reps - pt requiring max cues including hiss and pooch to improve quality, decreased ROM with proper form      Juanita participated in neuromuscular re-education activities to develop Coordination, Control, Proprioception and Sense for 2 minutes including:      Internal:  Diaphragmatic breathing for PF relaxation and awareness    Deferred:  Diaphragmatic breathing for PF awareness and relaxation x 15 - pt reporting good awareness  Reviewed urge delay strategies and when/how to use  Reviewed and performed gentle bowel massage for improved motility of large intestine for decreased constipation and urinary urgency  Child's pose with 10 diaphragmatic breaths x 10 for PF relaxation and ROM - pt reports she can feel it  Reviewed urinary urge control strategies and modifications for decreased urge with standing from seated position  Performed bowel massage for improved colonic motility and decreased constipation - reviewed using teach-back method  Internal:  Performed reassessment of PF coordination  Diaphragmatic breathing x 10 for PF relaxation and awareness - pt with increased chest expansion today, decreased with cueing  Kegel with exhale x several reps - pt performing squeeze on inhale, required extensive cueing to pair with correct breath  Bear down x 1 - pt with good form, exhaling correctly, mild decreased ROM  Diaphragmatic breathing for PF relaxation and awareness - pt requiring max cues for proper form and max cueing for movement of PFM      Juanita participated in dynamic functional therapeutic activities to improve functional performance for 42 minutes, including: Education as described below.        Home Exercises Provided and Patient Education Provided     Education Provided:   - bladder irritants and bladder retraining  Discussed progression of plan of care with patient; educated pt in activity modification; reviewed HEP with pt. Pt demonstrated and verbalized understanding of all instruction and was provided with a handout of HEP (see Patient Instructions).  - Continue practicing urge delay strategies in front of triggers  - Extensively reviewed HEP for d/c  - Reviewed diaphragmatic breathing  Deferred:  - Want to increase layer 2 and layer 3 for urgency and  to prevent leakage  - All potential causes of urinary urgency  - Can go to urologist if detrusor is the issue - med  - Blow out and squeeze in PF with hardest phase of exercise  - Reviewed how to determine number of reps and how long to hold  - How the muscles are moving with diaphragmatic breathing  - Reviewed strategies for constipation management and discussed new bowel massage, fiber for bulking if needed, bowel likes routine (don't change things)  - Need to keep up with HEP for maintenance of progress made  - Reviewed performance of kegels and specifics on how to improve endurance and strength, how to progress, etc.  - Extensive discussion on how to bear down for bowel movement - blow out, letting air out of stomach and draw in lower abdomen with PF relaxed  - How to know how long to hold for - challenging but doable  - Why relaxing fully between each rep is very important  - Blow out with kegel, blow out with bear down and work at bear down, kegels in different positions, how to know how long to hold, types of kegels to perform, TrA stuff  - Performing kegels in different positions  - Bearing down with exhale - used ball for feedback  - Remaining goals - improve strength, endurance  - Want to get kegel and TrA separately but it is okay to use one to help activate the other at first  - Improved kegel form, blowing out with kegel  - Strengthening + urinary urge control strategies improve holding of urine  - TrA and kegel will work together functionally      Written Home Exercises Provided: yes.  Exercises were reviewed and Juanita was able to demonstrate them prior to the end of the session. Juanita demonstrated good  understanding of the education provided.     See EMR under Patient Instructions for exercises provided 01/27/2022.    Assessment     Pt with excellent tolerance for treatment today, demonstrating good form with all exercises performed today and reporting understanding of all education provided. Pt  has met functional potential at this time and is appropriate for d/c. Overall pt has achieved resolution of all urinary symptoms barring rare leakage with urinary urge when holding past 2-2.5 hours and has been provided with a plan to continue to address this post-d/c. Pt reports satisfaction with goals and desires d/c at this time.    Juanita is progressing well towards her goals.   Pt prognosis is Good.     Pt will continue to benefit from skilled outpatient physical therapy to address the deficits listed in the problem list box on initial evaluation, provide pt/family education and to maximize pt's level of independence in the home and community environment.     Pt's spiritual, cultural and educational needs considered and pt agreeable to plan of care and goals.     Anticipated barriers to physical therapy: colitis    Goals:  Short Term Goals: 6 weeks   - Pt will be I in diaphragmatic breathing with proper technique to promote relaxation and pelvic floor functional mobility for improved urinary continence with ADLs. - MET 01/18  - Pt to increase pelvic floor strength to at least 3/5 to demonstrate improved strength needed for continence with ADLs. - MET 11/15  - Pt to be able to perform a 5 second kegel x 10 reps with good quality to demonstrate improving strength and endurance needed for continence. - MET 11/22  - Pt to voice understanding of the role that diet plays on urinary urgency. - MET 11/15  - Pt will be able to correctly and consistently explain urge control strategies to demonstrate understanding of these strategies, decrease likelihood of leakage, and increase time between voids. - MET 11/15  - Pt will report a 50% reduction in frequency of leakage to demonstrate improved pelvic floor coordination needed for continence with ADLs. - MET 11/15  - Pt to demonstrate proper positioning on commode with breathing techniques to decrease strain with BM to enable pt to feel empty after BM. - MET 11/15  - Pt to  demonstrate independence with performing bowel massage to help with gut motility. - MET 11/15     Long Term Goals: 12 weeks   - Pt to be I with home plan for carry over after discharge. - MET 01/27  - Pt to increase pelvic floor strength to at least 4/5 to demonstrate improved strength needed for continence with ADLs. - MET 01/27  - Pt to be able to perform a 10 second kegel x 10 reps with good quality to demonstrate improving strength and endurance needed for continence. - PARTIALLY MET 01/27  - Pt to report elimination of incontinence with ADLs to demonstrate improved pelvic floor muscle strength and coordination. - PARTIALLY MET 01/27  - Pt to report being able to have a BM without straining 90% of the time to demonstrate improving PF coordination. - PARTIALLY MET 01/27  - Pt to demonstrate an improved score in the FOTO Urinary Problem survey to 27% or less to demonstrate improving pelvic floor function for improved urinary continence with ADLs. - goal edited 01/18; MET 01/18    Plan     Continue per established POC as tolerated.    Luciana Katz, PT, DPT

## 2022-01-27 NOTE — PATIENT INSTRUCTIONS
SQUEEZE WITH THE EXHALE (aka, the kegel)!!!!     · Pelvic Floor Muscles:  · Kegels: 5 second holds, 2 x 5 (you can progress to holding for longer and doing more in a row) (can also do in sitting or in standing)  · When doing kegels these are the most important things to remember:  · EXHALE WITH THE SQUEEZE - do NOT hold your breath  · Make sure you relax in between every repetition  · If your muscle fatigues (your legs start to help, muscle starts to falter, etc.) - give it a 1 min rest and then start again  · Diaphragmatic (belly) breathing every night      · Urinary:  · Never go just in case  · Never rush to the bathroom  · We want every 2-3 hours frequency and no leakage  · Want to go when the bladder is full but need to be able to hold it if we cannot go  · Urge control strategies  · Bladder and pelvic floor work in opposites (squeeze vs. Relax)  · Other tips for helping with urgency  · Swing both legs out of the car at the same time to avoid leakage  · In the car: stay as calm as possible! Use the radio or a podcast to distract you.  · Practice the 3 urge control steps in front of your triggers even when you don't have the urge (I.e., go in the middle of the day to sit in your car and pull up in the driveway and practice; do this standing next to running water, etc.)  · What to do when you get an urge:  · If your urge is appropriate (I.e., your bladder is full): Use the urge control strategy to make the urge die down. Then start walking CALMLY to the bathroom. Stop as many times as needed on the way there as the urge returns. Repeat the process one more time once you get to the bathroom to ensure you are able to calmly pull down your pants. STAY CALM, use tools like your book on your phone to distract yourself.  · If your urge is not appropriate (I.e., your bladder is not full): Use the urge control strategy to delay the urge.      · Constipation management:  · Fiber/probiotics (Activia)  · Glass of warm water  in morning  · Bowel massage (1-2x per day)  · Positioning - stool (knees above hips), rest upper body on the knees/thighs with your forearms  · Letting PF relax to go pee and go poop  · Bearing down (widen the sit bones and bulge the pelvic floor) and blowing out as you bear down (can draw the lower belly in and make sure you are really letting the air out) - BLOW OUT AS YOU BEAR DOWN  · Movement is really helpful for helping the bowels move along to decreae constipation  · Dietary considerations:  · Soluble fiber gives stool bulk. Foods that are good sources of soluble fiber include apples, bananas, barley, oats, and beans. Google more examples of soluble fiber and increase these foods in your diet when your stool is loose. May take a day or so to see the change.  · BRAT diet (bananas, rice, applesauce, and toast - bland foods in general are good) when your stomach hurts and you are having diarrhea. Do NOT add more fiber to bulk the stool in this case.        Pelvic Floor Diaphragmatic Breathing     The diaphragm is the most efficient muscle of breathing. It is a large, dome-shaped muscle located at the base of the lungs under the bottom of your ribcage. There are many benefits to diaphragmatic (belly) breathing including:      Strengthens the diaphragm (& other accessory muscles)    Helps slow down your breathing rate and calms you    Decreases oxygen demand and uses less effort and energy to breathe    Reduces stress and anxiety, which calms your nervous system    Improves the coordination and range of motion of your pelvic floor muscles, allowing them to relax       Technique:    Place your hands on your sides under your ribcage.    Try to push your hands out as you inhale. Feel your ribcage and abdomen expand.    As you exhale, blow the air out of your body. Feel your ribcage pull back in and down and your abdomen deflate.    Perform this for 5-10 minutes on most days of the week, allowing the  "entire body to relax as you do.               Whenever you inhale and expand your lower ribcage and abdomen, your pelvic floor muscles will relax and push down. Whenever you exhale and deflate your ribcage and abdomen, your pelvic floor muscles will tighten slightly and pull up. Your pelvic floor muscles copy the movement of your diaphragm! Try to tune in to this movement as you breathe.        Drink a glass of warm water in the morning    Bowel Massage for Constipation    Lie on your back with legs in a comfortable position.     1) Start at the lower left pelvis, just next to the left hip bone. Use 2-3 fingers with flat, overlapped hands and apply downward pressure, making 10 kneading circles. Remember the "traffic jam" analogy - you must clear the cars at the front of the jam before you can move the cars in the back of the jam.     2) Next, do the same thing starting at the right lower pelvis, just next to the right hip bone. After you have made 10 circles there, move slightly upward and repeat. You will do this as you follow the colon (large intestine) up toward the ribs, then across the stomach toward the left side of the belly, then all the way down to first spot next to the left hip bone. You can see the path you should follow in the picture below.      Perform this 2x per day, morning and night.             Bowel Movement Body Mechanics    1. Sit on the toilet comfortably with legs and buttocks relaxed.  2. Put your feet on a step stool or squatty potty (8 inches tall). This helps the poop come out easier.  3. Use good "potty posture": Lean forward while keeping your back straight and rest your elbows or forearms on your knees, keeping the knees apart.  4. The pelvic floor has to fully relax for the poop to come out. Let your whole body relax, even letting the belly hang. Try to think about fully relaxing or "dropping" the pelvic floor muscles. You may want to do a few diaphragmatic breaths to help with " "this.  5. Exhale like you are blowing out birthday candles while you gently bear down. Do not strain or hold your breath!                URINARY URGE CONTROL   What to do when you "gotta go, gotta go"  FREEZE, BREATHE, SQUEEZE, repeat    When you experience a strong urge to urinate and know that your bladder is not as full as it has the potential to be (for instance, if you used the restroom 30 minutes to 1.5 hours ago), you should use this as a strategy to continue holding until your bladder is full.    You will use this strategy to incrementally increase time between voids and to avoid leaking. Continue to increase the time between going to the bathroom in increments of 5-15 minutes (whatever you are able to do). This will not happen overnight, but you will be successful using these techniques to gradually increase the time between voids.    FIRST - FREEZE: Do your best not to panic or rush to the toilet! You are more likely to leak if you do. Stop whatever you are doing, stand or sit quietly, and stay as calm as possible.    SECOND - BREATHE: Relax and take a few good deep breaths, letting it out slowly. This helps you further calm the nervous system and settles your bladder. Try thinking of something else to distract yourself from the urge (example: list categories of items like animals, fruits, cars, etc.)    THIRD - SQUEEZE: Do 5-10 "Quick Flicks" (1 second LIFT and squeeze of pelvic floor muscles, followed by a full DROP). Pelvic floor contractions send a message to the bladder to relax and hold urine. Continue doing your best to remain calm and distract yourself from the urge until it passes.    FINALLY: If the urge returns and it has not been at least 2* hours since you last went to the bathroom, repeat the above steps to regain control and return to your normal activity. If it has been 2* or more hours, you can go ahead and walk normally to the bathroom to use it. Avoid starting to undress until you are in " "the bathroom and ready to urinate.    If the urge returns as you walk to the bathroom and feel like you may leak on yourself: Repeat this as many times as you need to on the way to the bathroom (i.e., every time the urge comes back). We only want to be walking calmly to the bathroom once the urge has passed. When you get inside and close the door behind you, repeat this process one last time so that you have enough time to get to the toilet and pull your pants down without rushing.    Try this at home first in case you do have an accident, but as you continue to practice, your bladder will increase the ability its capacity and hold more urine without such a strong urge.     *Note: This time amount will vary based on your starting point; for example, if you started out going every 30 minutes, you will be using whatever your goal time is (i.e., if you are trying to increase your time between voids by 15 min, you can go once 45 minutes have passed). Our eventual goal is every 2-4 hours because that's how long it takes the bladder to fill completely, and we only want to go when the bladder is full.       Remember......"Control your bladder before it controls YOU!"    ALSO: Every time you stand up from a chair, do some squeezes to decrease that urge you're having with standing.      [place]    Do these in sitting and standing - do squeeze/relax and do holds (progress yourself with how long you're holding)    1. Lie on your back with your knees bent, comfortably with legs and buttocks relaxed.  2. Squeeze and LIFT the muscles that stop the flow of urine and passage of gas. Keep legs and buttock muscles quiet/relaxed.  3. Hold lift for 3 seconds without holding breath (you may exhale the entire time if needed).  4. Release and DROP the pelvic floor muscles for 10 seconds, making sure they relax fully and trying to feel them drop.  5. Repeat 10 times, 3 sets per day. Spread throughout the day if you would like.    "

## 2022-01-27 NOTE — PLAN OF CARE
[facility]  Pelvic Floor Physical Therapy Discharge Note    Name: Juanita Walton  Clinic Number: [id number]    Therapy Diagnosis:   Encounter Diagnoses   Name Primary?    Constipation, unspecified constipation type Yes    Pelvic floor weakness     Urinary frequency      Physician: Finesse Hermosillo,*    Physician Orders: PT Eval and Treat  Medical Diagnosis from Referral: Pelvic floor weakness in female [N81.89]  Evaluation Date: 10/11/2021      Date of Last visit: 01/27/2022  Total Visits Received: 12 + 1 eval    ASSESSMENT      Pt with excellent tolerance for treatment today, demonstrating good form with all exercises performed today and reporting understanding of all education provided. Pt has met functional potential at this time and is appropriate for d/c. Overall pt has achieved resolution of all urinary symptoms barring rare leakage with urinary urge when holding past 2-2.5 hours and has been provided with a plan to continue to address this post-d/c. Pt reports satisfaction with goals and desires d/c at this time.    Discharge reason: Patient has reached the maximum rehab potential for the present time    Discharge FOTO Score: 21% limitation compared to 33% at eval    Goals:  Short Term Goals: 6 weeks   - Pt will be I in diaphragmatic breathing with proper technique to promote relaxation and pelvic floor functional mobility for improved urinary continence with ADLs. - MET 01/18  - Pt to increase pelvic floor strength to at least 3/5 to demonstrate improved strength needed for continence with ADLs. - MET 11/15  - Pt to be able to perform a 5 second kegel x 10 reps with good quality to demonstrate improving strength and endurance needed for continence. - MET 11/22  - Pt to voice understanding of the role that diet plays on urinary urgency. - MET 11/15  - Pt will be able to correctly and consistently explain urge control strategies to demonstrate understanding of these  strategies, decrease likelihood of leakage, and increase time between voids. - MET 11/15  - Pt will report a 50% reduction in frequency of leakage to demonstrate improved pelvic floor coordination needed for continence with ADLs. - MET 11/15  - Pt to demonstrate proper positioning on commode with breathing techniques to decrease strain with BM to enable pt to feel empty after BM. - MET 11/15  - Pt to demonstrate independence with performing bowel massage to help with gut motility. - MET 11/15     Long Term Goals: 12 weeks   - Pt to be I with home plan for carry over after discharge. - MET 01/27  - Pt to increase pelvic floor strength to at least 4/5 to demonstrate improved strength needed for continence with ADLs. - MET 01/27  - Pt to be able to perform a 10 second kegel x 10 reps with good quality to demonstrate improving strength and endurance needed for continence. - PARTIALLY MET 01/27  - Pt to report elimination of incontinence with ADLs to demonstrate improved pelvic floor muscle strength and coordination. - PARTIALLY MET 01/27  - Pt to report being able to have a BM without straining 90% of the time to demonstrate improving PF coordination. - PARTIALLY MET 01/27  - Pt to demonstrate an improved score in the FOTO Urinary Problem survey to 27% or less to demonstrate improving pelvic floor function for improved urinary continence with ADLs. - goal edited 01/18; MET 01/18    PLAN   This patient is discharged from Physical Therapy      Luciana Gianna, PT, DPT

## 2022-01-28 ENCOUNTER — PATIENT MESSAGE (OUTPATIENT)
Dept: REHABILITATION | Facility: HOSPITAL | Age: 67
End: 2022-01-28
Payer: MEDICARE

## 2022-01-28 PROBLEM — R35.0 URINARY FREQUENCY: Status: RESOLVED | Noted: 2021-10-11 | Resolved: 2022-01-27

## 2022-01-28 PROBLEM — N81.89 PELVIC FLOOR WEAKNESS: Status: RESOLVED | Noted: 2021-10-11 | Resolved: 2022-01-27

## 2022-01-28 PROBLEM — K59.00 CONSTIPATION: Status: RESOLVED | Noted: 2021-10-11 | Resolved: 2022-01-27

## 2022-02-15 ENCOUNTER — IMMUNIZATION (OUTPATIENT)
Dept: FAMILY MEDICINE | Facility: CLINIC | Age: 67
End: 2022-02-15
Payer: MEDICARE

## 2022-02-15 PROCEDURE — G0008 ADMIN INFLUENZA VIRUS VAC: HCPCS | Mod: PBBFAC,PO

## 2022-03-21 ENCOUNTER — LAB VISIT (OUTPATIENT)
Dept: LAB | Facility: HOSPITAL | Age: 67
End: 2022-03-21
Attending: PHYSICIAN ASSISTANT
Payer: MEDICARE

## 2022-03-21 DIAGNOSIS — M35.9 UNDIFFERENTIATED CONNECTIVE TISSUE DISEASE: ICD-10-CM

## 2022-03-21 LAB
ALBUMIN SERPL BCP-MCNC: 4.1 G/DL (ref 3.5–5.2)
ALP SERPL-CCNC: 65 U/L (ref 55–135)
ALT SERPL W/O P-5'-P-CCNC: 20 U/L (ref 10–44)
ANION GAP SERPL CALC-SCNC: 10 MMOL/L (ref 8–16)
AST SERPL-CCNC: 24 U/L (ref 10–40)
BASOPHILS # BLD AUTO: 0.02 K/UL (ref 0–0.2)
BASOPHILS NFR BLD: 0.6 % (ref 0–1.9)
BILIRUB SERPL-MCNC: 0.5 MG/DL (ref 0.1–1)
BUN SERPL-MCNC: 10 MG/DL (ref 8–23)
CALCIUM SERPL-MCNC: 9.8 MG/DL (ref 8.7–10.5)
CHLORIDE SERPL-SCNC: 105 MMOL/L (ref 95–110)
CO2 SERPL-SCNC: 28 MMOL/L (ref 23–29)
CREAT SERPL-MCNC: 1.1 MG/DL (ref 0.5–1.4)
CRP SERPL-MCNC: 1.1 MG/L (ref 0–8.2)
DIFFERENTIAL METHOD: ABNORMAL
EOSINOPHIL # BLD AUTO: 0.1 K/UL (ref 0–0.5)
EOSINOPHIL NFR BLD: 3.2 % (ref 0–8)
ERYTHROCYTE [DISTWIDTH] IN BLOOD BY AUTOMATED COUNT: 12.6 % (ref 11.5–14.5)
ERYTHROCYTE [SEDIMENTATION RATE] IN BLOOD BY WESTERGREN METHOD: 17 MM/HR (ref 0–36)
EST. GFR  (AFRICAN AMERICAN): >60 ML/MIN/1.73 M^2
EST. GFR  (NON AFRICAN AMERICAN): 53 ML/MIN/1.73 M^2
GLUCOSE SERPL-MCNC: 85 MG/DL (ref 70–110)
HCT VFR BLD AUTO: 40.6 % (ref 37–48.5)
HGB BLD-MCNC: 13.5 G/DL (ref 12–16)
IMM GRANULOCYTES # BLD AUTO: 0 K/UL (ref 0–0.04)
IMM GRANULOCYTES NFR BLD AUTO: 0 % (ref 0–0.5)
LYMPHOCYTES # BLD AUTO: 1.2 K/UL (ref 1–4.8)
LYMPHOCYTES NFR BLD: 39.2 % (ref 18–48)
MCH RBC QN AUTO: 31.3 PG (ref 27–31)
MCHC RBC AUTO-ENTMCNC: 33.3 G/DL (ref 32–36)
MCV RBC AUTO: 94 FL (ref 82–98)
MONOCYTES # BLD AUTO: 0.3 K/UL (ref 0.3–1)
MONOCYTES NFR BLD: 8.9 % (ref 4–15)
NEUTROPHILS # BLD AUTO: 1.5 K/UL (ref 1.8–7.7)
NEUTROPHILS NFR BLD: 48.1 % (ref 38–73)
NRBC BLD-RTO: 0 /100 WBC
PLATELET # BLD AUTO: 189 K/UL (ref 150–450)
PMV BLD AUTO: 9.9 FL (ref 9.2–12.9)
POTASSIUM SERPL-SCNC: 3.7 MMOL/L (ref 3.5–5.1)
PROT SERPL-MCNC: 7.9 G/DL (ref 6–8.4)
RBC # BLD AUTO: 4.32 M/UL (ref 4–5.4)
SODIUM SERPL-SCNC: 143 MMOL/L (ref 136–145)
WBC # BLD AUTO: 3.14 K/UL (ref 3.9–12.7)

## 2022-03-21 PROCEDURE — 85652 RBC SED RATE AUTOMATED: CPT | Performed by: PHYSICIAN ASSISTANT

## 2022-03-21 PROCEDURE — 80053 COMPREHEN METABOLIC PANEL: CPT | Performed by: PHYSICIAN ASSISTANT

## 2022-03-21 PROCEDURE — 85025 COMPLETE CBC W/AUTO DIFF WBC: CPT | Performed by: PHYSICIAN ASSISTANT

## 2022-03-21 PROCEDURE — 86140 C-REACTIVE PROTEIN: CPT | Performed by: PHYSICIAN ASSISTANT

## 2022-03-30 ENCOUNTER — TELEPHONE (OUTPATIENT)
Dept: RHEUMATOLOGY | Facility: CLINIC | Age: 67
End: 2022-03-30
Payer: MEDICARE

## 2022-03-31 ENCOUNTER — OFFICE VISIT (OUTPATIENT)
Dept: RHEUMATOLOGY | Facility: CLINIC | Age: 67
End: 2022-03-31
Payer: MEDICARE

## 2022-03-31 VITALS
SYSTOLIC BLOOD PRESSURE: 117 MMHG | HEIGHT: 64 IN | DIASTOLIC BLOOD PRESSURE: 66 MMHG | BODY MASS INDEX: 28.17 KG/M2 | WEIGHT: 165 LBS | HEART RATE: 65 BPM

## 2022-03-31 DIAGNOSIS — M35.9 UNDIFFERENTIATED CONNECTIVE TISSUE DISEASE: Primary | ICD-10-CM

## 2022-03-31 DIAGNOSIS — Z79.899 LONG-TERM USE OF PLAQUENIL: ICD-10-CM

## 2022-03-31 DIAGNOSIS — D72.819 LEUKOPENIA, UNSPECIFIED TYPE: ICD-10-CM

## 2022-03-31 DIAGNOSIS — M35.00 SICCA SYNDROME: ICD-10-CM

## 2022-03-31 PROCEDURE — 99213 OFFICE O/P EST LOW 20 MIN: CPT | Mod: PBBFAC

## 2022-03-31 PROCEDURE — 99999 PR PBB SHADOW E&M-EST. PATIENT-LVL III: CPT | Mod: PBBFAC,,,

## 2022-03-31 PROCEDURE — 99215 OFFICE O/P EST HI 40 MIN: CPT | Mod: S$PBB,,,

## 2022-03-31 PROCEDURE — 99999 PR PBB SHADOW E&M-EST. PATIENT-LVL III: ICD-10-PCS | Mod: PBBFAC,,,

## 2022-03-31 PROCEDURE — 99215 PR OFFICE/OUTPT VISIT, EST, LEVL V, 40-54 MIN: ICD-10-PCS | Mod: S$PBB,,,

## 2022-03-31 NOTE — PROGRESS NOTES
RHEUMATOLOGY OUTPATIENT CLINIC NOTE    03/31/2022    Subjective:       Patient ID: Juanita Walton is a 66 y.o. female.    Chief Complaint: No chief complaint on file.        Juanita Walton is a 66 y.o. pleasant female here for rheumatology follow up for undifferentiated connective tissue disease.  Currently on Plaquenil 200 mg three times weekly and is doing well. Completed physical therapy for her back pain and reports great improvement. She is doing the home exercises as well. Pilocarpine 5 mg once daily for dry mouth, occasionally bid. Currently without joint pain, no morning stiffness. Rheumatologic ROS negative otherwise.     Physical exam with no swelling of any joints, no synovitis, no tenderness to palpation, full range of motion tarun ue/le. No dactylitis, no enthesitis, no effusion, no parotitis.         Past Medical History:   Diagnosis Date    Anemia     Essential hypertension     History of HPV infection     Hyperlipidemia     Overweight (BMI 25.0-29.9)     Sicca syndrome     Ulcerative colitis     Undifferentiated connective tissue disease      Past Surgical History:   Procedure Laterality Date    TUBAL LIGATION       Family History   Problem Relation Age of Onset    Cataracts Mother     Hypertension Mother     Rheum arthritis Mother     Heart failure Mother     Hypertension Sister     Cataracts Sister     Hypertension Brother     No Known Problems Father     Multiple sclerosis Grandchild     Breast cancer Maternal Aunt      Social History     Socioeconomic History    Marital status: Single   Tobacco Use    Smoking status: Never Smoker    Smokeless tobacco: Never Used   Substance and Sexual Activity    Alcohol use: Not Currently     Alcohol/week: 0.0 standard drinks     Comment: Twice per year    Drug use: Never    Sexual activity: Yes     Partners: Male     Birth control/protection: None   Social History Narrative    The patient is  and one adult daughter  "who lives with her.  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   She works as a notary.     Social Determinants of Health     Financial Resource Strain: Low Risk     Difficulty of Paying Living Expenses: Not hard at all   Food Insecurity: No Food Insecurity    Worried About Running Out of Food in the Last Year: Never true    Ran Out of Food in the Last Year: Never true   Transportation Needs: No Transportation Needs    Lack of Transportation (Medical): No    Lack of Transportation (Non-Medical): No   Physical Activity: Insufficiently Active    Days of Exercise per Week: 3 days    Minutes of Exercise per Session: 30 min   Stress: No Stress Concern Present    Feeling of Stress : Not at all   Social Connections: Unknown    Frequency of Communication with Friends and Family: More than three times a week    Frequency of Social Gatherings with Friends and Family: Three times a week    Active Member of Clubs or Organizations: Yes    Attends Club or Organization Meetings: More than 4 times per year    Marital Status:      Review of patient's allergies indicates:  No Known Allergies        Objective:   /66   Pulse 65   Ht 5' 4" (1.626 m)   Wt 74.9 kg (165 lb 0.2 oz)   BMI 28.32 kg/m²   Immunization History   Administered Date(s) Administered    COVID-19, MRNA, LN-S, PF (Pfizer) (Purple Cap) 03/17/2021, 04/07/2021, 12/10/2021    Influenza 12/12/2006, 10/24/2017, 11/10/2018    Influenza (FLUAD) - Quadrivalent - Adjuvanted - PF *Preferred* (65+) 02/15/2022    Influenza - Quadrivalent 11/10/2014, 01/04/2016, 10/25/2016    Influenza - Quadrivalent - PF *Preferred* (6 months and older) 09/25/2019, 09/23/2020    Influenza Split 12/09/2005, 11/17/2008, 11/18/2011, 12/19/2012, 11/10/2013    Pneumococcal Conjugate - 13 Valent 01/19/2017    Pneumococcal Polysaccharide - 23 Valent 09/14/2021    Tdap 12/14/2017                   Recent Results (from the past 672 hour(s))   CBC Auto " Differential    Collection Time: 03/21/22 10:01 AM   Result Value Ref Range    WBC 3.14 (L) 3.90 - 12.70 K/uL    RBC 4.32 4.00 - 5.40 M/uL    Hemoglobin 13.5 12.0 - 16.0 g/dL    Hematocrit 40.6 37.0 - 48.5 %    MCV 94 82 - 98 fL    MCH 31.3 (H) 27.0 - 31.0 pg    MCHC 33.3 32.0 - 36.0 g/dL    RDW 12.6 11.5 - 14.5 %    Platelets 189 150 - 450 K/uL    MPV 9.9 9.2 - 12.9 fL    Immature Granulocytes 0.0 0.0 - 0.5 %    Gran # (ANC) 1.5 (L) 1.8 - 7.7 K/uL    Immature Grans (Abs) 0.00 0.00 - 0.04 K/uL    Lymph # 1.2 1.0 - 4.8 K/uL    Mono # 0.3 0.3 - 1.0 K/uL    Eos # 0.1 0.0 - 0.5 K/uL    Baso # 0.02 0.00 - 0.20 K/uL    nRBC 0 0 /100 WBC    Gran % 48.1 38.0 - 73.0 %    Lymph % 39.2 18.0 - 48.0 %    Mono % 8.9 4.0 - 15.0 %    Eosinophil % 3.2 0.0 - 8.0 %    Basophil % 0.6 0.0 - 1.9 %    Differential Method Automated    Comprehensive Metabolic Panel    Collection Time: 03/21/22 10:01 AM   Result Value Ref Range    Sodium 143 136 - 145 mmol/L    Potassium 3.7 3.5 - 5.1 mmol/L    Chloride 105 95 - 110 mmol/L    CO2 28 23 - 29 mmol/L    Glucose 85 70 - 110 mg/dL    BUN 10 8 - 23 mg/dL    Creatinine 1.1 0.5 - 1.4 mg/dL    Calcium 9.8 8.7 - 10.5 mg/dL    Total Protein 7.9 6.0 - 8.4 g/dL    Albumin 4.1 3.5 - 5.2 g/dL    Total Bilirubin 0.5 0.1 - 1.0 mg/dL    Alkaline Phosphatase 65 55 - 135 U/L    AST 24 10 - 40 U/L    ALT 20 10 - 44 U/L    Anion Gap 10 8 - 16 mmol/L    eGFR if African American >60 >60 mL/min/1.73 m^2    eGFR if non African American 53 (A) >60 mL/min/1.73 m^2   C-Reactive Protein    Collection Time: 03/21/22 10:01 AM   Result Value Ref Range    CRP 1.1 0.0 - 8.2 mg/L   Sedimentation rate    Collection Time: 03/21/22 10:01 AM   Result Value Ref Range    Sed Rate 17 0 - 36 mm/Hr        No results found for: TBGOLDPLUS   Lab Results   Component Value Date    HEPAIGM Negative 04/28/2004    HEPBIGM Negative 04/28/2004    HEPCAB Negative 04/28/2004        Assessment:       1. Undifferentiated connective tissue disease     2. Sicca syndrome    3. Long-term use of Plaquenil    4. Leukopenia, unspecified type          Impression:     UCTD  Positive ELIEL, CCP antibody, RF, elevated immunoglobulin  Doing well on plaquenil 200 mg 3 days weekly    Sicca syndrome   Well controlled pilocarpine 5 mg daily, occ bid    Long term use plaquenil  Yearly eye exams  Tolerating well    Leukopenia  Recent decrease in WBC   Patient just started entyvio for UC  Denies fever, chills, cough, sob, other symptoms of infection    Plan:          Can recheck CBC in 4 weeks to monitor WBC. Also okay if patient prefers to have GI follow this.     Continue home physical therapy exercises  Continue swimming exercises     Continue pilocarpine 5 mg daily to bid as needed    Continue plaquenil 200 mg three times weekly. If continues to do well, may further decrease or discontinue at next appointment.   Annual eye exams      Follow up 6 months with reg4 prior      Chelsea Malone PA-C  [facility]       45 minutes of total time spent on the encounter, which includes face to face time and non-face to face time preparing to see the patient (eg, review of tests), Obtaining and/or reviewing separately obtained history, Documenting clinical information in the electronic or other health record, Independently interpreting results (not separately reported) and communicating results to the patient/family/caregiver, or Care coordination (not separately reported).

## 2022-04-19 ENCOUNTER — PATIENT MESSAGE (OUTPATIENT)
Dept: ADMINISTRATIVE | Facility: OTHER | Age: 67
End: 2022-04-19
Payer: MEDICARE

## 2022-04-21 ENCOUNTER — LAB VISIT (OUTPATIENT)
Dept: LAB | Facility: HOSPITAL | Age: 67
End: 2022-04-21
Payer: MEDICARE

## 2022-04-21 DIAGNOSIS — D72.819 LEUKOPENIA, UNSPECIFIED TYPE: ICD-10-CM

## 2022-04-21 LAB
BASOPHILS # BLD AUTO: 0.03 K/UL (ref 0–0.2)
BASOPHILS NFR BLD: 0.7 % (ref 0–1.9)
DIFFERENTIAL METHOD: ABNORMAL
EOSINOPHIL # BLD AUTO: 0.1 K/UL (ref 0–0.5)
EOSINOPHIL NFR BLD: 2.7 % (ref 0–8)
ERYTHROCYTE [DISTWIDTH] IN BLOOD BY AUTOMATED COUNT: 12.3 % (ref 11.5–14.5)
HCT VFR BLD AUTO: 37.4 % (ref 37–48.5)
HGB BLD-MCNC: 13 G/DL (ref 12–16)
IMM GRANULOCYTES # BLD AUTO: 0 K/UL (ref 0–0.04)
IMM GRANULOCYTES NFR BLD AUTO: 0 % (ref 0–0.5)
LYMPHOCYTES # BLD AUTO: 1.5 K/UL (ref 1–4.8)
LYMPHOCYTES NFR BLD: 37.5 % (ref 18–48)
MCH RBC QN AUTO: 31.7 PG (ref 27–31)
MCHC RBC AUTO-ENTMCNC: 34.8 G/DL (ref 32–36)
MCV RBC AUTO: 91 FL (ref 82–98)
MONOCYTES # BLD AUTO: 0.5 K/UL (ref 0.3–1)
MONOCYTES NFR BLD: 12.1 % (ref 4–15)
NEUTROPHILS # BLD AUTO: 1.9 K/UL (ref 1.8–7.7)
NEUTROPHILS NFR BLD: 47 % (ref 38–73)
NRBC BLD-RTO: 0 /100 WBC
PLATELET # BLD AUTO: 208 K/UL (ref 150–450)
PMV BLD AUTO: 9.2 FL (ref 9.2–12.9)
RBC # BLD AUTO: 4.1 M/UL (ref 4–5.4)
WBC # BLD AUTO: 4.05 K/UL (ref 3.9–12.7)

## 2022-04-21 PROCEDURE — 85025 COMPLETE CBC W/AUTO DIFF WBC: CPT

## 2022-04-21 PROCEDURE — 36415 COLL VENOUS BLD VENIPUNCTURE: CPT

## 2022-05-18 ENCOUNTER — TELEPHONE (OUTPATIENT)
Dept: FAMILY MEDICINE | Facility: CLINIC | Age: 67
End: 2022-05-18
Payer: MEDICARE

## 2022-05-20 ENCOUNTER — LAB VISIT (OUTPATIENT)
Dept: LAB | Facility: HOSPITAL | Age: 67
End: 2022-05-20
Attending: FAMILY MEDICINE
Payer: MEDICARE

## 2022-05-20 ENCOUNTER — OFFICE VISIT (OUTPATIENT)
Dept: FAMILY MEDICINE | Facility: CLINIC | Age: 67
End: 2022-05-20
Payer: MEDICARE

## 2022-05-20 VITALS
TEMPERATURE: 97 F | OXYGEN SATURATION: 98 % | HEART RATE: 73 BPM | WEIGHT: 165.56 LBS | BODY MASS INDEX: 28.27 KG/M2 | HEIGHT: 64 IN | DIASTOLIC BLOOD PRESSURE: 64 MMHG | SYSTOLIC BLOOD PRESSURE: 124 MMHG

## 2022-05-20 DIAGNOSIS — R06.02 SOB (SHORTNESS OF BREATH) ON EXERTION: ICD-10-CM

## 2022-05-20 DIAGNOSIS — M35.00 SICCA SYNDROME: ICD-10-CM

## 2022-05-20 DIAGNOSIS — R53.83 FATIGUE, UNSPECIFIED TYPE: ICD-10-CM

## 2022-05-20 DIAGNOSIS — R06.02 SOB (SHORTNESS OF BREATH) ON EXERTION: Primary | ICD-10-CM

## 2022-05-20 DIAGNOSIS — I10 ESSENTIAL HYPERTENSION: ICD-10-CM

## 2022-05-20 DIAGNOSIS — K51.30 ULCERATIVE RECTOSIGMOIDITIS WITHOUT COMPLICATION: ICD-10-CM

## 2022-05-20 LAB
ALBUMIN SERPL BCP-MCNC: 3.9 G/DL (ref 3.5–5.2)
ALP SERPL-CCNC: 62 U/L (ref 55–135)
ALT SERPL W/O P-5'-P-CCNC: 15 U/L (ref 10–44)
ANION GAP SERPL CALC-SCNC: 11 MMOL/L (ref 8–16)
AST SERPL-CCNC: 22 U/L (ref 10–40)
BILIRUB SERPL-MCNC: 0.3 MG/DL (ref 0.1–1)
BNP SERPL-MCNC: 27 PG/ML (ref 0–99)
BUN SERPL-MCNC: 9 MG/DL (ref 8–23)
CALCIUM SERPL-MCNC: 9.7 MG/DL (ref 8.7–10.5)
CHLORIDE SERPL-SCNC: 106 MMOL/L (ref 95–110)
CO2 SERPL-SCNC: 26 MMOL/L (ref 23–29)
CREAT SERPL-MCNC: 1 MG/DL (ref 0.5–1.4)
EST. GFR  (AFRICAN AMERICAN): >60 ML/MIN/1.73 M^2
EST. GFR  (NON AFRICAN AMERICAN): 58.8 ML/MIN/1.73 M^2
GLUCOSE SERPL-MCNC: 87 MG/DL (ref 70–110)
POTASSIUM SERPL-SCNC: 4.5 MMOL/L (ref 3.5–5.1)
PROT SERPL-MCNC: 7.6 G/DL (ref 6–8.4)
SODIUM SERPL-SCNC: 143 MMOL/L (ref 136–145)
TSH SERPL DL<=0.005 MIU/L-ACNC: 2.07 UIU/ML (ref 0.4–4)

## 2022-05-20 PROCEDURE — 99214 OFFICE O/P EST MOD 30 MIN: CPT | Mod: S$PBB,,, | Performed by: FAMILY MEDICINE

## 2022-05-20 PROCEDURE — 84443 ASSAY THYROID STIM HORMONE: CPT | Performed by: FAMILY MEDICINE

## 2022-05-20 PROCEDURE — 93010 EKG 12-LEAD: ICD-10-PCS | Mod: S$PBB,,, | Performed by: INTERNAL MEDICINE

## 2022-05-20 PROCEDURE — 80053 COMPREHEN METABOLIC PANEL: CPT | Performed by: FAMILY MEDICINE

## 2022-05-20 PROCEDURE — 99214 PR OFFICE/OUTPT VISIT, EST, LEVL IV, 30-39 MIN: ICD-10-PCS | Mod: S$PBB,,, | Performed by: FAMILY MEDICINE

## 2022-05-20 PROCEDURE — 83880 ASSAY OF NATRIURETIC PEPTIDE: CPT | Performed by: FAMILY MEDICINE

## 2022-05-20 PROCEDURE — 93010 ELECTROCARDIOGRAM REPORT: CPT | Mod: S$PBB,,, | Performed by: INTERNAL MEDICINE

## 2022-05-20 PROCEDURE — 93005 ELECTROCARDIOGRAM TRACING: CPT | Mod: PBBFAC,PO | Performed by: INTERNAL MEDICINE

## 2022-05-20 PROCEDURE — 99999 PR PBB SHADOW E&M-EST. PATIENT-LVL III: ICD-10-PCS | Mod: PBBFAC,,, | Performed by: FAMILY MEDICINE

## 2022-05-20 PROCEDURE — 99213 OFFICE O/P EST LOW 20 MIN: CPT | Mod: PBBFAC,PO | Performed by: FAMILY MEDICINE

## 2022-05-20 PROCEDURE — 36415 COLL VENOUS BLD VENIPUNCTURE: CPT | Mod: PO | Performed by: FAMILY MEDICINE

## 2022-05-20 PROCEDURE — 99999 PR PBB SHADOW E&M-EST. PATIENT-LVL III: CPT | Mod: PBBFAC,,, | Performed by: FAMILY MEDICINE

## 2022-05-20 NOTE — PROGRESS NOTES
CHIEF COMPLAINT:  This is a 67-year-old female complaining of shortness of breath and fatigue.     SUBJECTIVE:  The patient began taking swimming lessons in January 2022. She felt fine while swimming until February when she began experiencing shortness of breath and fatigue with exertion. 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  She improved with rest.  Patient denies associated chest pain, rapid or irregular heartbeat, lightheadedness or syncope.  Patient reports slight lower extremity swelling.  She reports her arms frequently feel tight and numb when she wakes up in the middle of the night.  Patient admits to situational stress.    Patient has hypertension for which she takes a combination of amlodipine 10 mg, olmesartan 40 mg and HCTZ 12.5 mg daily.  Blood pressure is controlled with today's reading 124/64.  She takes atorvastatin 10 mg daily for hyperlipidemia.  Patient is followed by a cardiologist.  Patient has sicca syndrome and undifferentiated connective tissue disease for which she takes hydroxychloroquine.  She has ulcerative colitis and is followed by GI.  She was  started on to Entyvio in the last 3 months.       ROS:  GENERAL: Patient denies fever, chills, night sweats.  Patient denies weight gain or loss. Patient denies anorexia, weakness or swollen glands.  Positive for fatigue.  SKIN: Patient denies rash or hair loss.  HEENT: Patient denies sore throat, ear pain, hearing loss, nasal congestion, or runny nose. Patient denies visual disturbance, eye irritation or discharge.  LUNGS: Patient denies cough, wheeze or hemoptysis.  CARDIOVASCULAR: Patient denies chest pain, palpitations, lightheadedness or syncope.  Positive for shortness of breath and lower extremity edema.  GI: Patient denies abdominal pain, nausea, vomiting, diarrhea, constipation, blood in stool or melena.  GENITOURINARY: Patient denies pelvic pain, vaginal discharge, itch  or odor. Patient denies irregular vaginal bleeding.  Patient denies dysuria, frequency, hematuria, nocturia, urgency or incontinence.  MUSCULOSKELETAL: Patient denies joint pain, swelling, redness or warmth.  NEUROLOGIC: Patient denies headache, vertigo, paresthesias, weakness in limb, dysarthria, dysphagia or abnormality of gait.  PSYCHIATRIC: Patient denies anxiety, depression, or memory loss.     OBJECTIVE:   GENERAL: Well-developed well-nourished  overweight black female alert and oriented x3, in no acute distress.  Memory, judgment and cognition without deficit.    SKIN:  Fine maculopapular rash on chin and lower face.  Normal color and tone.  HEENT: Eyes: Clear conjunctivae. No scleral icterus.  Ears: Clear canals. Clear TMs.  Nose: Without congestion.  Pharynx: Without injection or exudates.  NECK: Supple, normal range of motion.  No masses, lymphadenopathy or enlarged thyroid.  No JVD.  Carotids 2+ and equal.  No bruits.  LUNGS: Clear to auscultation.  Normal respiratory effort.  CARDIOVASCULAR:  Regular rhythm, normal S1, S2 without murmur, gallop or rub.  ABDOMEN: Active bowel sounds.  Soft, nontender without mass or organomegaly.  No rebound or guarding.  EXTREMITIES: Without cyanosis or clubbing.  Trace ankle edema.  Distal pulses 2+ and equal.  Normal range of motion in all extremities.  No joint effusion, erythema or warmth.  Bilateral bunions.  NEUROLOGIC: Gait without abnormality.  No tremor.      EKG:  Sinus rhythm with first-degree AV block.  Right bundle branch block.  No acute changes.    ASSESSMENT:  1. SOB (shortness of breath) on exertion    2. Fatigue, unspecified type    3. Essential hypertension    4. Ulcerative rectosigmoiditis without complication    5. Sicca syndrome      PLAN:  1. Check CMP, BNP and TSH.  2. Weight reduction.  3. Stay active.  4. Follow-up with cardiologist for further evaluation and testing.    3. Seek medical attention for worsening condition.    30 minutes of total  time spent on the encounter, which includes face to face time and non-face to face time preparing to see the patient (eg, review of tests), Obtaining and/or reviewing separately obtained history, Documenting clinical information in the electronic or other health record, Independently interpreting results (not separately reported) and communicating results to the patient/family/caregiver, or Care coordination (not separately reported).     This note is generated with speech recognition software and is subject to transcription error and sound alike phrases that may be missed by proofreading.

## 2022-08-02 ENCOUNTER — PATIENT MESSAGE (OUTPATIENT)
Dept: ADMINISTRATIVE | Facility: OTHER | Age: 67
End: 2022-08-02
Payer: MEDICARE

## 2022-08-10 ENCOUNTER — IMMUNIZATION (OUTPATIENT)
Dept: PHARMACY | Facility: CLINIC | Age: 67
End: 2022-08-10
Payer: MEDICARE

## 2022-08-10 DIAGNOSIS — Z23 NEED FOR VACCINATION: Primary | ICD-10-CM

## 2022-09-09 ENCOUNTER — OFFICE VISIT (OUTPATIENT)
Dept: FAMILY MEDICINE | Facility: CLINIC | Age: 67
End: 2022-09-09
Payer: MEDICARE

## 2022-09-09 VITALS
HEIGHT: 64 IN | OXYGEN SATURATION: 98 % | BODY MASS INDEX: 29.1 KG/M2 | HEART RATE: 86 BPM | WEIGHT: 170.44 LBS | DIASTOLIC BLOOD PRESSURE: 75 MMHG | TEMPERATURE: 97 F | SYSTOLIC BLOOD PRESSURE: 127 MMHG

## 2022-09-09 DIAGNOSIS — K51.30 ULCERATIVE RECTOSIGMOIDITIS WITHOUT COMPLICATION: ICD-10-CM

## 2022-09-09 DIAGNOSIS — I10 ESSENTIAL HYPERTENSION: Primary | Chronic | ICD-10-CM

## 2022-09-09 DIAGNOSIS — Z12.31 ENCOUNTER FOR SCREENING MAMMOGRAM FOR MALIGNANT NEOPLASM OF BREAST: ICD-10-CM

## 2022-09-09 DIAGNOSIS — E78.5 HYPERLIPIDEMIA, UNSPECIFIED HYPERLIPIDEMIA TYPE: Chronic | ICD-10-CM

## 2022-09-09 PROCEDURE — 99214 OFFICE O/P EST MOD 30 MIN: CPT | Mod: S$PBB,,, | Performed by: FAMILY MEDICINE

## 2022-09-09 PROCEDURE — 99213 OFFICE O/P EST LOW 20 MIN: CPT | Mod: PBBFAC,PO | Performed by: FAMILY MEDICINE

## 2022-09-09 PROCEDURE — 99214 PR OFFICE/OUTPT VISIT, EST, LEVL IV, 30-39 MIN: ICD-10-PCS | Mod: S$PBB,,, | Performed by: FAMILY MEDICINE

## 2022-09-09 PROCEDURE — 99999 PR PBB SHADOW E&M-EST. PATIENT-LVL III: ICD-10-PCS | Mod: PBBFAC,,, | Performed by: FAMILY MEDICINE

## 2022-09-09 PROCEDURE — 99999 PR PBB SHADOW E&M-EST. PATIENT-LVL III: CPT | Mod: PBBFAC,,, | Performed by: FAMILY MEDICINE

## 2022-09-09 NOTE — PROGRESS NOTES
CHIEF COMPLAINT:  This is a 67-year-old female here for preventive health exam.     SUBJECTIVE: Patient is doing well without complaints. Patient has hypertension for which she takes a combination of amlodipine 10 mg, olmesartan 40 mg and HCTZ 12.5 mg daily.  Blood pressure is controlled with today's reading 127/75  She takes atorvastatin 10 mg daily for hyperlipidemia.  Patient is followed by a cardiologist.  Patient has sicca syndrome and undifferentiated connective tissue disease for which she takes hydroxychloroquine.  She has ulcerative colitis and is followed by GI.  She takes Entyvio every 8 weeks.  Patient had HPV type 16 and other high risk types on Pap in June 2019.  Colposcopy in June 2019 was negative.      Eye exam January 2022.   Pap smear September 2021.  Mammogram September 2021.  Bone DEXA scan September 2021. Colonoscopy June 2021.  Immunizations: Influenza vaccine September 2020.  Prevnar January 2017.  Tdap December 2017.  COVID 19 vaccine March, April, December 2021, August 2022.     ROS:  GENERAL: Patient denies fever, chills, night sweats.  Patient denies weight gain or loss. Patient denies anorexia, fatigue, weakness or swollen glands.  SKIN: Patient denies rash or hair loss.  HEENT: Patient denies sore throat, ear pain, hearing loss, nasal congestion, or runny nose. Patient denies visual disturbance, eye irritation or discharge.  LUNGS: Patient denies cough, wheeze or hemoptysis.  CARDIOVASCULAR: Patient denies chest pain, shortness of breath, palpitations, syncope or lower extremity edema.  GI: Patient denies abdominal pain, nausea, vomiting, diarrhea, constipation, blood in stool or melena.  GENITOURINARY: Patient denies pelvic pain, vaginal discharge, itch or odor. Patient denies irregular vaginal bleeding.  Patient denies dysuria, frequency, hematuria, nocturia, urgency or incontinence.  BREASTS: Patient denies breast pain, mass or nipple discharge.  MUSCULOSKELETAL: Patient denies joint  pain, swelling, redness or warmth.  NEUROLOGIC: Patient denies headache, vertigo, paresthesias, weakness in limb, dysarthria, dysphagia or abnormality of gait.  PSYCHIATRIC: Patient denies anxiety, depression, or memory loss.     OBJECTIVE:   GENERAL: Well-developed well-nourished slightly overweight black female alert and oriented x3, in no acute distress.  Memory, judgment and cognition without deficit.  Weight gain of 8 pounds in the last year.  SKIN:  Fine maculopapular rash on chin and lower face.  Normal color and tone.  HEENT: Eyes: Clear conjunctivae. No scleral icterus.  Pupils equal reactive to light and accommodation.  Ears: Clear canals. Clear TMs.  Nose: Without congestion.  Pharynx: Without injection or exudates.  NECK: Supple, normal range of motion.  No masses, lymphadenopathy or enlarged thyroid.  No JVD.  Carotids 2+ and equal.  No bruits.  LUNGS: Clear to auscultation.  Normal respiratory effort.  CARDIOVASCULAR:  Regular rhythm, normal S1, S2 without murmur, gallop or rub.  BACK:  No CVA or spinal tenderness.  BREASTS:  No masses, tenderness or nipple discharge.  ABDOMEN: Normal appearance.  Active bowel sounds.  Soft, nontender without mass or organomegaly.  No rebound or guarding.  EXTREMITIES: Without cyanosis, clubbing or edema.  Distal pulses 2+ and equal.  Normal range of motion in all extremities.  No joint effusion, erythema or warmth.  Bilateral bunions.  NEUROLOGIC:   Cranial nerves II through XII without deficit.  Motor strength equal bilaterally.  Sensation normal to touch.  Deep tendon reflexes 2+ and equal.  Gait without abnormality.  No tremor.  Negative cerebellar signs.  PELVIC:  External: Without lesions or inflammation.  Urethral meatus normal size and shape Vaginal: Clear discharge, atrophic changes.  Cervix: Normal appearance.  Friable.  No motion tenderness. No Pap  Uterus: Normal size and shape.  Adnexa: Non-tender, without masses.  Rectovaginal: Confirms.  Normal anal  sphincter tone.  Heme-negative stool x3.    ASSESSMENT:  1. Essential hypertension    2. Hyperlipidemia, unspecified hyperlipidemia type    3. 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 Encounter for screening mammogram for malignant neoplasm of breast      PLAN:  1. Weight reduction. Exercise regularly.  2. Age-appropriate counseling.  3. Lipid panel.  4. Mammogram.  5. Refill medications as needed.  6.  Follow-up annually.    30 minutes of total time spent on the encounter, which includes face to face time and non-face to face time preparing to see the patient (eg, review of tests), Obtaining and/or reviewing separately obtained history, Documenting clinical information in the electronic or other health record, Independently interpreting results (not separately reported) and communicating results to the patient/family/caregiver, or Care coordination (not separately reported).     This note is generated with speech recognition software and is subject to transcription error and sound alike phrases that may be missed by proofreading.

## 2022-10-04 ENCOUNTER — PATIENT MESSAGE (OUTPATIENT)
Dept: RHEUMATOLOGY | Facility: CLINIC | Age: 67
End: 2022-10-04

## 2022-10-04 ENCOUNTER — OFFICE VISIT (OUTPATIENT)
Dept: RHEUMATOLOGY | Facility: CLINIC | Age: 67
End: 2022-10-04
Payer: MEDICARE

## 2022-10-04 ENCOUNTER — HOSPITAL ENCOUNTER (OUTPATIENT)
Dept: RADIOLOGY | Facility: HOSPITAL | Age: 67
Discharge: HOME | End: 2022-10-04
Payer: MEDICARE

## 2022-10-04 VITALS
HEART RATE: 76 BPM | DIASTOLIC BLOOD PRESSURE: 76 MMHG | BODY MASS INDEX: 29.81 KG/M2 | HEIGHT: 64 IN | WEIGHT: 174.63 LBS | SYSTOLIC BLOOD PRESSURE: 136 MMHG

## 2022-10-04 DIAGNOSIS — R76.8 RHEUMATOID FACTOR POSITIVE: ICD-10-CM

## 2022-10-04 DIAGNOSIS — M35.9 UNDIFFERENTIATED CONNECTIVE TISSUE DISEASE: Primary | ICD-10-CM

## 2022-10-04 DIAGNOSIS — M35.00 SICCA SYNDROME: ICD-10-CM

## 2022-10-04 DIAGNOSIS — M35.9 UNDIFFERENTIATED CONNECTIVE TISSUE DISEASE: ICD-10-CM

## 2022-10-04 DIAGNOSIS — Z79.899 LONG-TERM USE OF PLAQUENIL: ICD-10-CM

## 2022-10-04 PROCEDURE — 73130 X-RAY EXAM OF HAND: CPT | Mod: TC,50

## 2022-10-04 PROCEDURE — 73130 X-RAY EXAM OF HAND: CPT | Mod: 26,50,, | Performed by: RADIOLOGY

## 2022-10-04 PROCEDURE — 99999 PR PBB SHADOW E&M-EST. PATIENT-LVL III: ICD-10-PCS | Mod: PBBFAC,,,

## 2022-10-04 PROCEDURE — 99213 OFFICE O/P EST LOW 20 MIN: CPT | Mod: PBBFAC

## 2022-10-04 PROCEDURE — 99215 PR OFFICE/OUTPT VISIT, EST, LEVL V, 40-54 MIN: ICD-10-PCS | Mod: S$PBB,,,

## 2022-10-04 PROCEDURE — 99215 OFFICE O/P EST HI 40 MIN: CPT | Mod: S$PBB,,,

## 2022-10-04 PROCEDURE — 99999 PR PBB SHADOW E&M-EST. PATIENT-LVL III: CPT | Mod: PBBFAC,,,

## 2022-10-04 PROCEDURE — 73130 XR HAND COMPLETE 3 VIEWS BILATERAL: ICD-10-PCS | Mod: 26,50,, | Performed by: RADIOLOGY

## 2022-10-04 RX ORDER — HYDROXYCHLOROQUINE SULFATE 200 MG/1
200 TABLET, FILM COATED ORAL DAILY
Qty: 90 TABLET | Refills: 0 | Status: SHIPPED | OUTPATIENT
Start: 2022-10-04 | End: 2023-09-07

## 2022-10-04 NOTE — PROGRESS NOTES
RHEUMATOLOGY OUTPATIENT CLINIC NOTE    10/04/2022    Subjective:       Patient ID: Juanita Walton is a 67 y.o. female.    Chief Complaint: Disease Management        Juanita Walton is a 67 y.o. pleasant female here for rheumatology follow up for undifferentiated connective tissue disease.  Currently on Plaquenil 200 mg three times weekly and is doing well.  Pilocarpine only as needed 5 mg once daily for dry mouth, occasionally bid.  Occasionally has tenderness to left middle finger proximal IP joint.  Worse with knocking or increased activity.  No significant swelling.  Currently without joint pain, no morning stiffness. Rheumatologic ROS negative otherwise.     Physical exam with no swelling of any joints, no synovitis, no tenderness to palpation, full range of motion tarun ue/le. No dactylitis, no enthesitis, no effusion, no parotitis.         Past Medical History:   Diagnosis Date    Anemia     Essential hypertension     History of HPV infection     Hyperlipidemia     Overweight (BMI 25.0-29.9)     Sicca syndrome     Ulcerative colitis     Undifferentiated connective tissue disease      Past Surgical History:   Procedure Laterality Date    TUBAL LIGATION       Family History   Problem Relation Age of Onset    Cataracts Mother     Hypertension Mother     Rheum arthritis Mother     Heart failure Mother     Hypertension Sister     Cataracts Sister     Hypertension Brother     No Known Problems Father     Multiple sclerosis Grandchild     Breast cancer Maternal Aunt      Social History     Socioeconomic History    Marital status: Single   Tobacco Use    Smoking status: Never    Smokeless tobacco: Never   Substance and Sexual Activity    Alcohol use: Not Currently     Alcohol/week: 0.0 standard drinks     Comment: Twice per year    Drug use: Never    Sexual activity: Yes     Partners: Male     Birth control/protection: None   Social History Narrative    The patient is  and one adult daughter who  "lives with her.  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   She works as a notary.     Review of patient's allergies indicates:  No Known Allergies        Objective:   /76   Pulse 76   Ht 5' 4" (1.626 m)   Wt 79.2 kg (174 lb 9.7 oz)   BMI 29.97 kg/m²   Immunization History   Administered Date(s) Administered    COVID-19, MRNA, LN-S, PF (Pfizer) (Gray Cap) 08/10/2022    COVID-19, MRNA, LN-S, PF (Pfizer) (Purple Cap) 03/17/2021, 04/07/2021, 12/10/2021    Influenza 12/12/2006, 10/24/2017, 11/10/2018    Influenza (FLUAD) - Quadrivalent - Adjuvanted - PF *Preferred* (65+) 02/15/2022    Influenza - Quadrivalent 11/10/2014, 01/04/2016, 10/25/2016    Influenza - Quadrivalent - PF *Preferred* (6 months and older) 09/25/2019, 09/23/2020    Influenza Split 12/09/2005, 11/17/2008, 11/18/2011, 12/19/2012, 11/10/2013    Pneumococcal Conjugate - 13 Valent 01/19/2017    Pneumococcal Polysaccharide - 23 Valent 09/14/2021    Tdap 12/14/2017                   No results found for this or any previous visit (from the past 672 hour(s)).       No results found for: TBGOLDPLUS   Lab Results   Component Value Date    HEPAIGM Negative 04/28/2004    HEPBIGM Negative 04/28/2004    HEPCAB Negative 04/28/2004        Assessment:       1. Undifferentiated connective tissue disease    2. Rheumatoid factor positive    3. Sicca syndrome    4. Long-term use of Plaquenil            Impression:     UCTD  Positive ELIEL, CCP antibody, RF, elevated immunoglobulin  Doing well on plaquenil 200 mg 3 days weekly  Occasionally with left middle finger proximal IP joint pain.  Will update imaging today    Sicca syndrome   Well controlled pilocarpine 5 mg daily, occ bid  Only uses as needed    Long term use plaquenil  Yearly eye exams  Tolerating well    Leukopenia  Resolved  Will update today    Plan:          Continue home physical therapy exercises  Continue swimming exercises     Continue pilocarpine 5 mg daily to bid as " needed    Continue plaquenil 200 mg three times weekly. If continues to do well, may further decrease or discontinue at next appointment.   Annual eye exams    Recommend topical Voltaren 3-4 times daily as needed to left middle finger proximal IP joint as needed    Update reg4, lupus labs today.  Add Dr. Marrero's lipid panel   Bilateral hand x-ray today    Follow up 6 months with reg4 prior      Chelsea Malone PA-C  [facility]  Rheumatology       40 minutes of total time spent on the encounter, which includes face to face time and non-face to face time preparing to see the patient (eg, review of tests), Obtaining and/or reviewing separately obtained history, Documenting clinical information in the electronic or other health record, Independently interpreting results (not separately reported) and communicating results to the patient/family/caregiver, or Care coordination (not separately reported).

## 2022-10-13 ENCOUNTER — HOSPITAL ENCOUNTER (OUTPATIENT)
Dept: RADIOLOGY | Facility: HOSPITAL | Age: 67
Discharge: HOME | End: 2022-10-13
Attending: FAMILY MEDICINE
Payer: MEDICARE

## 2022-10-13 VITALS — BODY MASS INDEX: 29.81 KG/M2 | WEIGHT: 174.63 LBS | HEIGHT: 64 IN

## 2022-10-13 DIAGNOSIS — Z12.31 ENCOUNTER FOR SCREENING MAMMOGRAM FOR MALIGNANT NEOPLASM OF BREAST: ICD-10-CM

## 2022-10-13 PROCEDURE — 77067 SCR MAMMO BI INCL CAD: CPT | Mod: 26,,, | Performed by: RADIOLOGY

## 2022-10-13 PROCEDURE — 77063 BREAST TOMOSYNTHESIS BI: CPT | Mod: 26,,, | Performed by: RADIOLOGY

## 2022-10-13 PROCEDURE — 77063 MAMMO DIGITAL SCREENING BILAT WITH TOMO: ICD-10-PCS | Mod: 26,,, | Performed by: RADIOLOGY

## 2022-10-13 PROCEDURE — 77063 BREAST TOMOSYNTHESIS BI: CPT | Mod: TC

## 2022-10-13 PROCEDURE — 77067 MAMMO DIGITAL SCREENING BILAT WITH TOMO: ICD-10-PCS | Mod: 26,,, | Performed by: RADIOLOGY

## 2022-11-10 ENCOUNTER — PATIENT MESSAGE (OUTPATIENT)
Dept: ADMINISTRATIVE | Facility: OTHER | Age: 67
End: 2022-11-10
Payer: MEDICARE

## 2022-11-16 LAB — CRC RECOMMENDATION EXT: NORMAL

## 2022-11-30 ENCOUNTER — IMMUNIZATION (OUTPATIENT)
Dept: INTERNAL MEDICINE | Facility: CLINIC | Age: 67
End: 2022-11-30
Payer: MEDICARE

## 2022-11-30 PROCEDURE — G0008 ADMIN INFLUENZA VIRUS VAC: HCPCS | Mod: PBBFAC

## 2022-12-01 ENCOUNTER — PATIENT OUTREACH (OUTPATIENT)
Dept: ADMINISTRATIVE | Facility: HOSPITAL | Age: 67
End: 2022-12-01
Payer: MEDICARE

## 2022-12-01 NOTE — LETTER
AUTHORIZATION FOR RELEASE OF   CONFIDENTIAL INFORMATION    We are seeing Juanita Walton, date of birth 1955, in the clinic at [facility]. Rosalba Marrero MD is the patient's PCP. Juanita Walton has an outstanding lab/procedure at the time we reviewed her chart. In order to help keep her health information updated, she has authorized us to request the following medical record(s):        (  )  MAMMOGRAM                                      ( x )  COLONOSCOPY 2022      (  )  PAP SMEAR                                          (  )  OUTSIDE LAB RESULTS     (  )  DEXA SCAN                                          (  )  EYE EXAM            (  )  FOOT EXAM                                          (  )  ENTIRE RECORD     (  )  OUTSIDE IMMUNIZATIONS                 (  )  _______________         [facility], [phone]     If you have any questions, please contact    Kaitlyn PETERSON [facility] at [phone]           Patient Name: Juanita Walton  : 1955  Patient Phone #: [phone]

## 2022-12-01 NOTE — PROGRESS NOTES
Manually uploaded and hyper-linked COLON PATHOLOGY 11/16/2022  SHIRA faxed to Dr. Mandi kohler to request colonoscopy records. Remind me set 1 week.

## 2022-12-07 NOTE — PROGRESS NOTES
2nd attempt  SHIRA faxed to Dr. Raymond 1x to request colonoscopy records. Remind me set 1 week.

## 2022-12-13 ENCOUNTER — OFFICE VISIT (OUTPATIENT)
Dept: FAMILY MEDICINE | Facility: CLINIC | Age: 67
End: 2022-12-13
Payer: MEDICARE

## 2022-12-13 VITALS
DIASTOLIC BLOOD PRESSURE: 67 MMHG | TEMPERATURE: 99 F | HEART RATE: 99 BPM | HEIGHT: 64 IN | BODY MASS INDEX: 29.79 KG/M2 | WEIGHT: 174.5 LBS | SYSTOLIC BLOOD PRESSURE: 106 MMHG | OXYGEN SATURATION: 98 %

## 2022-12-13 DIAGNOSIS — L98.8 PAPULOSQUAMOUS DERMATOSIS: Primary | ICD-10-CM

## 2022-12-13 PROCEDURE — 99999 PR PBB SHADOW E&M-EST. PATIENT-LVL III: ICD-10-PCS | Mod: PBBFAC,,, | Performed by: FAMILY MEDICINE

## 2022-12-13 PROCEDURE — 99999 PR PBB SHADOW E&M-EST. PATIENT-LVL III: CPT | Mod: PBBFAC,,, | Performed by: FAMILY MEDICINE

## 2022-12-13 PROCEDURE — 99213 OFFICE O/P EST LOW 20 MIN: CPT | Mod: S$PBB,,, | Performed by: FAMILY MEDICINE

## 2022-12-13 PROCEDURE — 99213 OFFICE O/P EST LOW 20 MIN: CPT | Mod: PBBFAC,PO | Performed by: FAMILY MEDICINE

## 2022-12-13 PROCEDURE — 99213 PR OFFICE/OUTPT VISIT, EST, LEVL III, 20-29 MIN: ICD-10-PCS | Mod: S$PBB,,, | Performed by: FAMILY MEDICINE

## 2022-12-13 RX ORDER — CLOBETASOL PROPIONATE 0.5 MG/G
CREAM TOPICAL 2 TIMES DAILY
Qty: 45 G | Refills: 2 | Status: SHIPPED | OUTPATIENT
Start: 2022-12-13 | End: 2024-01-17

## 2022-12-13 NOTE — PROGRESS NOTES
CHIEF COMPLAINT:  This is a 67-year-old female complaining of rash.    SUBJECTIVE:  Patient complains of a 1 month history of rash which started on right inner thigh and now involves the skin of lateral ankle.  Rash is pruritic.  She has been applying hydrocortisone 2.5% lotion without relief.  Patient denies change in lotions, soaps or detergents.  She denies starting new medications either prescription or over-the-counter.  Patient has undifferentiated connective tissue disease, sicca syndrome and ulcerative colitis.  Patient takes immunosuppressive  medication.    ROS:  GENERAL: Patient denies fever, chills, night sweats.  Patient denies weight gain or loss. Patient denies anorexia, fatigue, weakness or swollen glands.  SKIN:  Positive for rash.    HEENT: Patient denies sore throat, ear pain, hearing loss, nasal congestion, or runny nose. Patient denies visual disturbance, eye irritation or discharge.  LUNGS: Patient denies cough, wheeze or hemoptysis.  CARDIOVASCULAR: Patient denies chest pain, shortness of breath, palpitations, syncope or lower extremity edema.  GI: Patient denies abdominal pain, nausea, vomiting, diarrhea, constipation, blood in stool or melena.  GENITOURINARY: Patient denies irregular vaginal bleeding.  Patient denies dysuria, frequency, hematuria, nocturia, urgency or incontinence.  MUSCULOSKELETAL: Patient denies joint pain, swelling, redness or warmth.  NEUROLOGIC: Patient denies headache, vertigo, paresthesias, weakness in limb, or abnormality of gait.    OBJECTIVE:   GENERAL: Well-developed well-nourished overweight black female alert and oriented x3, in no acute distress.  Memory, judgment and cognition without deficit.   SKIN:  Papulosquamous eruption right inner thigh and more pronounced on right lateral ankle with minimal erythema.  No excoriations.  HEENT: Eyes: Clear conjunctivae. No scleral icterus.    NECK: Supple, normal range of motion.    LUNGS:  Normal respiratory  effort.  EXTREMITIES: Without cyanosis, clubbing or edema.  Distal pulses 2+ and equal.  Normal range of motion in all extremities.  No joint effusion, erythema or warmth.    NEUROLOGIC:  Gait without abnormality.  No tremor.      ASSESSMENT:  1. Papulosquamous dermatosis      PLAN:   1.  Apply clobetasol 0.05% cream twice daily to rash for 2 weeks.  2.  Follow-up if no improvement or worsening symptoms.    20 minutes of total time spent on the encounter, which includes face to face time and non-face to face time preparing to see the patient (eg, review of tests), Obtaining and/or reviewing separately obtained history, Documenting clinical information in the electronic or other health record, Independently interpreting results (not separately reported) and communicating results to the patient/family/caregiver, or Care coordination (not separately reported).     This note is generated with speech recognition software and is subject to transcription error and sound alike phrases that may be missed by proofreading.

## 2023-01-12 ENCOUNTER — PATIENT MESSAGE (OUTPATIENT)
Dept: OPHTHALMOLOGY | Facility: CLINIC | Age: 68
End: 2023-01-12

## 2023-01-12 ENCOUNTER — OFFICE VISIT (OUTPATIENT)
Dept: OPHTHALMOLOGY | Facility: CLINIC | Age: 68
End: 2023-01-12
Payer: MEDICARE

## 2023-01-12 DIAGNOSIS — I10 ESSENTIAL HYPERTENSION: ICD-10-CM

## 2023-01-12 DIAGNOSIS — Z79.899 LONG-TERM USE OF PLAQUENIL: ICD-10-CM

## 2023-01-12 DIAGNOSIS — H25.13 CATARACT, NUCLEAR SCLEROTIC SENILE, BILATERAL: ICD-10-CM

## 2023-01-12 DIAGNOSIS — H52.7 REFRACTIVE ERRORS: ICD-10-CM

## 2023-01-12 DIAGNOSIS — M35.9 UNDIFFERENTIATED CONNECTIVE TISSUE DISEASE: Primary | ICD-10-CM

## 2023-01-12 PROCEDURE — 92014 COMPRE OPH EXAM EST PT 1/>: CPT | Mod: S$PBB,,, | Performed by: OPTOMETRIST

## 2023-01-12 PROCEDURE — 92014 PR EYE EXAM, EST PATIENT,COMPREHESV: ICD-10-PCS | Mod: S$PBB,,, | Performed by: OPTOMETRIST

## 2023-01-12 PROCEDURE — 99213 OFFICE O/P EST LOW 20 MIN: CPT | Mod: PBBFAC | Performed by: OPTOMETRIST

## 2023-01-12 PROCEDURE — 99999 PR PBB SHADOW E&M-EST. PATIENT-LVL III: ICD-10-PCS | Mod: PBBFAC,,, | Performed by: OPTOMETRIST

## 2023-01-12 PROCEDURE — 92134 POSTERIOR SEGMENT OCT RETINA (OCULAR COHERENCE TOMOGRAPHY)-BOTH EYES: ICD-10-PCS | Mod: 26,S$PBB,, | Performed by: OPTOMETRIST

## 2023-01-12 PROCEDURE — 92015 DETERMINE REFRACTIVE STATE: CPT | Mod: ,,, | Performed by: OPTOMETRIST

## 2023-01-12 PROCEDURE — 92015 PR REFRACTION: ICD-10-PCS | Mod: ,,, | Performed by: OPTOMETRIST

## 2023-01-12 PROCEDURE — 99999 PR PBB SHADOW E&M-EST. PATIENT-LVL III: CPT | Mod: PBBFAC,,, | Performed by: OPTOMETRIST

## 2023-01-12 PROCEDURE — 92134 CPTRZ OPH DX IMG PST SGM RTA: CPT | Mod: PBBFAC | Performed by: OPTOMETRIST

## 2023-01-12 NOTE — PROGRESS NOTES
HPI     encounter for eye exam     Additional comments: Yearly exam/ plaquenil check           Comments    No dm  No sx's          Last edited by Belinda Gee on 1/12/2023 10:16 AM.            Assessment /Plan     For exam results, see Encounter Report.    Undifferentiated connective tissue disease  -     Posterior Segment OCT Retina-Both eyes    Long-term use of Plaquenil  -     Posterior Segment OCT Retina-Both eyes    Essential hypertension    Cataract, nuclear sclerotic senile, bilateral    Refractive errors      No active anterior or posterior uveitis OU.  No ocular changes from Plaquenil use  Normal mOCT OU    No HTN Retinopathy    Mild cataracts OU, not surgical.    Dispense Final Rx for glasses.  RTC 1 year  Discussed above and answered questions.

## 2023-02-13 ENCOUNTER — PATIENT MESSAGE (OUTPATIENT)
Dept: ADMINISTRATIVE | Facility: OTHER | Age: 68
End: 2023-02-13
Payer: MEDICARE

## 2023-04-03 ENCOUNTER — LAB VISIT (OUTPATIENT)
Dept: LAB | Facility: HOSPITAL | Age: 68
End: 2023-04-03
Payer: MEDICARE

## 2023-04-03 DIAGNOSIS — R76.8 RHEUMATOID FACTOR POSITIVE: ICD-10-CM

## 2023-04-03 DIAGNOSIS — M35.9 UNDIFFERENTIATED CONNECTIVE TISSUE DISEASE: ICD-10-CM

## 2023-04-03 LAB
ALBUMIN SERPL BCP-MCNC: 4 G/DL (ref 3.5–5.2)
ALP SERPL-CCNC: 72 U/L (ref 55–135)
ALT SERPL W/O P-5'-P-CCNC: 22 U/L (ref 10–44)
ANION GAP SERPL CALC-SCNC: 8 MMOL/L (ref 8–16)
AST SERPL-CCNC: 26 U/L (ref 10–40)
BASOPHILS # BLD AUTO: 0.03 K/UL (ref 0–0.2)
BASOPHILS NFR BLD: 0.6 % (ref 0–1.9)
BILIRUB SERPL-MCNC: 0.2 MG/DL (ref 0.1–1)
BUN SERPL-MCNC: 13 MG/DL (ref 8–23)
CALCIUM SERPL-MCNC: 9.7 MG/DL (ref 8.7–10.5)
CHLORIDE SERPL-SCNC: 106 MMOL/L (ref 95–110)
CO2 SERPL-SCNC: 29 MMOL/L (ref 23–29)
CREAT SERPL-MCNC: 1.2 MG/DL (ref 0.5–1.4)
CRP SERPL-MCNC: 1.1 MG/L (ref 0–8.2)
DIFFERENTIAL METHOD: ABNORMAL
EOSINOPHIL # BLD AUTO: 0.2 K/UL (ref 0–0.5)
EOSINOPHIL NFR BLD: 3.4 % (ref 0–8)
ERYTHROCYTE [DISTWIDTH] IN BLOOD BY AUTOMATED COUNT: 12.5 % (ref 11.5–14.5)
EST. GFR  (NO RACE VARIABLE): 50 ML/MIN/1.73 M^2
GLUCOSE SERPL-MCNC: 82 MG/DL (ref 70–110)
HCT VFR BLD AUTO: 37.9 % (ref 37–48.5)
HGB BLD-MCNC: 13 G/DL (ref 12–16)
IMM GRANULOCYTES # BLD AUTO: 0.01 K/UL (ref 0–0.04)
IMM GRANULOCYTES NFR BLD AUTO: 0.2 % (ref 0–0.5)
LYMPHOCYTES # BLD AUTO: 2.1 K/UL (ref 1–4.8)
LYMPHOCYTES NFR BLD: 41.9 % (ref 18–48)
MCH RBC QN AUTO: 31.6 PG (ref 27–31)
MCHC RBC AUTO-ENTMCNC: 34.3 G/DL (ref 32–36)
MCV RBC AUTO: 92 FL (ref 82–98)
MONOCYTES # BLD AUTO: 0.6 K/UL (ref 0.3–1)
MONOCYTES NFR BLD: 11.5 % (ref 4–15)
NEUTROPHILS # BLD AUTO: 2.1 K/UL (ref 1.8–7.7)
NEUTROPHILS NFR BLD: 42.4 % (ref 38–73)
NRBC BLD-RTO: 0 /100 WBC
PLATELET # BLD AUTO: 214 K/UL (ref 150–450)
PMV BLD AUTO: 9.2 FL (ref 9.2–12.9)
POTASSIUM SERPL-SCNC: 4 MMOL/L (ref 3.5–5.1)
PROT SERPL-MCNC: 7.7 G/DL (ref 6–8.4)
RBC # BLD AUTO: 4.11 M/UL (ref 4–5.4)
SODIUM SERPL-SCNC: 143 MMOL/L (ref 136–145)
WBC # BLD AUTO: 4.94 K/UL (ref 3.9–12.7)

## 2023-04-03 PROCEDURE — 86140 C-REACTIVE PROTEIN: CPT

## 2023-04-03 PROCEDURE — 36415 COLL VENOUS BLD VENIPUNCTURE: CPT

## 2023-04-03 PROCEDURE — 80053 COMPREHEN METABOLIC PANEL: CPT

## 2023-04-03 PROCEDURE — 85652 RBC SED RATE AUTOMATED: CPT

## 2023-04-03 PROCEDURE — 85025 COMPLETE CBC W/AUTO DIFF WBC: CPT

## 2023-04-04 ENCOUNTER — PATIENT MESSAGE (OUTPATIENT)
Dept: RHEUMATOLOGY | Facility: CLINIC | Age: 68
End: 2023-04-04

## 2023-04-04 ENCOUNTER — OFFICE VISIT (OUTPATIENT)
Dept: RHEUMATOLOGY | Facility: CLINIC | Age: 68
End: 2023-04-04
Payer: MEDICARE

## 2023-04-04 VITALS — BODY MASS INDEX: 29.13 KG/M2 | HEIGHT: 64 IN | RESPIRATION RATE: 16 BRPM | WEIGHT: 170.63 LBS

## 2023-04-04 DIAGNOSIS — Z13.820 ENCOUNTER FOR OSTEOPOROSIS SCREENING IN ASYMPTOMATIC POSTMENOPAUSAL PATIENT: Primary | ICD-10-CM

## 2023-04-04 DIAGNOSIS — M35.9 UNDIFFERENTIATED CONNECTIVE TISSUE DISEASE: ICD-10-CM

## 2023-04-04 DIAGNOSIS — M35.00 SICCA SYNDROME: ICD-10-CM

## 2023-04-04 DIAGNOSIS — R76.8 RHEUMATOID FACTOR POSITIVE: ICD-10-CM

## 2023-04-04 DIAGNOSIS — Z79.899 LONG-TERM USE OF PLAQUENIL: ICD-10-CM

## 2023-04-04 DIAGNOSIS — Z78.0 ENCOUNTER FOR OSTEOPOROSIS SCREENING IN ASYMPTOMATIC POSTMENOPAUSAL PATIENT: Primary | ICD-10-CM

## 2023-04-04 LAB — ERYTHROCYTE [SEDIMENTATION RATE] IN BLOOD BY PHOTOMETRIC METHOD: 9 MM/HR (ref 0–36)

## 2023-04-04 PROCEDURE — 99999 PR PBB SHADOW E&M-EST. PATIENT-LVL III: ICD-10-PCS | Mod: PBBFAC,,,

## 2023-04-04 PROCEDURE — 99214 OFFICE O/P EST MOD 30 MIN: CPT | Mod: S$PBB,,,

## 2023-04-04 PROCEDURE — 99999 PR PBB SHADOW E&M-EST. PATIENT-LVL III: CPT | Mod: PBBFAC,,,

## 2023-04-04 PROCEDURE — 99213 OFFICE O/P EST LOW 20 MIN: CPT | Mod: PBBFAC

## 2023-04-04 PROCEDURE — 99214 PR OFFICE/OUTPT VISIT, EST, LEVL IV, 30-39 MIN: ICD-10-PCS | Mod: S$PBB,,,

## 2023-04-04 NOTE — PROGRESS NOTES
RHEUMATOLOGY OUTPATIENT CLINIC NOTE    04/04/2023    Subjective:       Patient ID: Juanita Walton is a 67 y.o. female.    Chief Complaint: undifferentiated connective tissue disease           Juanita Walton is a 67 y.o. pleasant female here for rheumatology follow up for undifferentiated connective tissue disease.  Currently on Plaquenil 200 mg three times weekly and is doing well.  Pilocarpine only as needed 5 mg once daily for dry mouth, occasionally bid.  No significant swelling.  Currently without joint pain, no morning stiffness. Rheumatologic ROS negative otherwise.     One fall last night while chasing dog. Scraped left elbow but no other injury.    Recent left shoulder pain, improved with tylenol and topical ice.       Physical exam with no swelling of any joints, no synovitis, no tenderness to palpation, full range of motion tarun ue/le. No dactylitis, no enthesitis, no effusion, no parotitis.  Abrasion on left elbow, no other rash.        Past Medical History:   Diagnosis Date    Anemia     Essential hypertension     History of HPV infection     Hyperlipidemia     Overweight (BMI 25.0-29.9)     Sicca syndrome     Ulcerative colitis     Undifferentiated connective tissue disease      Past Surgical History:   Procedure Laterality Date    TUBAL LIGATION       Family History   Problem Relation Age of Onset    Cataracts Mother     Hypertension Mother     Rheum arthritis Mother     Heart failure Mother     Hypertension Sister     Cataracts Sister     Hypertension Brother     No Known Problems Father     Multiple sclerosis Grandchild     Breast cancer Maternal Aunt      Social History     Socioeconomic History    Marital status: Single   Tobacco Use    Smoking status: Never    Smokeless tobacco: Never   Substance and Sexual Activity    Alcohol use: Not Currently     Alcohol/week: 0.0 standard drinks     Comment: Twice per year    Drug use: Never    Sexual activity: Yes     Partners: Male     Birth  "control/protection: None   Social History Narrative    The patient is  and one adult daughter who lives with her.  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   She works as a notary.     Social Determinants of Health     Financial Resource Strain: Low Risk     Difficulty of Paying Living Expenses: Not hard at all   Food Insecurity: No Food Insecurity    Worried About Running Out of Food in the Last Year: Never true    Ran Out of Food in the Last Year: Never true   Transportation Needs: No Transportation Needs    Lack of Transportation (Medical): No    Lack of Transportation (Non-Medical): No   Physical Activity: Insufficiently Active    Days of Exercise per Week: 3 days    Minutes of Exercise per Session: 20 min   Stress: No Stress Concern Present    Feeling of Stress : Not at all   Social Connections: Unknown    Frequency of Communication with Friends and Family: More than three times a week    Frequency of Social Gatherings with Friends and Family: More than three times a week    Active Member of Clubs or Organizations: Yes    Attends Club or Organization Meetings: More than 4 times per year    Marital Status:    Housing Stability: Low Risk     Unable to Pay for Housing in the Last Year: No    Number of Places Lived in the Last Year: 1    Unstable Housing in the Last Year: No     Review of patient's allergies indicates:  No Known Allergies        Objective:   Resp 16   Ht 5' 4" (1.626 m)   Wt 77.4 kg (170 lb 10.2 oz)   BMI 29.29 kg/m²   Immunization History   Administered Date(s) Administered    COVID-19, MRNA, LN-S, PF (Pfizer) (Gray Cap) 08/10/2022    COVID-19, MRNA, LN-S, PF (Pfizer) (Purple Cap) 03/17/2021, 04/07/2021, 12/10/2021    Influenza 12/12/2006, 10/24/2017, 11/10/2018    Influenza (FLUAD) - Quadrivalent - Adjuvanted - PF *Preferred* (65+) 02/15/2022, 11/30/2022    Influenza - Quadrivalent 11/10/2014, 01/04/2016, 10/25/2016    Influenza - Quadrivalent - PF *Preferred* " (6 months and older) 09/25/2019, 09/23/2020    Influenza Split 12/09/2005, 11/17/2008, 11/18/2011, 12/19/2012, 11/10/2013    Pneumococcal Conjugate - 13 Valent 01/19/2017    Pneumococcal Polysaccharide - 23 Valent 09/14/2021    Tdap 12/14/2017                   Recent Results (from the past 672 hour(s))   CBC Auto Differential    Collection Time: 04/03/23  4:46 PM   Result Value Ref Range    WBC 4.94 3.90 - 12.70 K/uL    RBC 4.11 4.00 - 5.40 M/uL    Hemoglobin 13.0 12.0 - 16.0 g/dL    Hematocrit 37.9 37.0 - 48.5 %    MCV 92 82 - 98 fL    MCH 31.6 (H) 27.0 - 31.0 pg    MCHC 34.3 32.0 - 36.0 g/dL    RDW 12.5 11.5 - 14.5 %    Platelets 214 150 - 450 K/uL    MPV 9.2 9.2 - 12.9 fL    Immature Granulocytes 0.2 0.0 - 0.5 %    Gran # (ANC) 2.1 1.8 - 7.7 K/uL    Immature Grans (Abs) 0.01 0.00 - 0.04 K/uL    Lymph # 2.1 1.0 - 4.8 K/uL    Mono # 0.6 0.3 - 1.0 K/uL    Eos # 0.2 0.0 - 0.5 K/uL    Baso # 0.03 0.00 - 0.20 K/uL    nRBC 0 0 /100 WBC    Gran % 42.4 38.0 - 73.0 %    Lymph % 41.9 18.0 - 48.0 %    Mono % 11.5 4.0 - 15.0 %    Eosinophil % 3.4 0.0 - 8.0 %    Basophil % 0.6 0.0 - 1.9 %    Differential Method Automated    Comprehensive Metabolic Panel    Collection Time: 04/03/23  4:46 PM   Result Value Ref Range    Sodium 143 136 - 145 mmol/L    Potassium 4.0 3.5 - 5.1 mmol/L    Chloride 106 95 - 110 mmol/L    CO2 29 23 - 29 mmol/L    Glucose 82 70 - 110 mg/dL    BUN 13 8 - 23 mg/dL    Creatinine 1.2 0.5 - 1.4 mg/dL    Calcium 9.7 8.7 - 10.5 mg/dL    Total Protein 7.7 6.0 - 8.4 g/dL    Albumin 4.0 3.5 - 5.2 g/dL    Total Bilirubin 0.2 0.1 - 1.0 mg/dL    Alkaline Phosphatase 72 55 - 135 U/L    AST 26 10 - 40 U/L    ALT 22 10 - 44 U/L    Anion Gap 8 8 - 16 mmol/L    eGFR 50 (A) >60 mL/min/1.73 m^2   Sedimentation rate    Collection Time: 04/03/23  4:46 PM   Result Value Ref Range    Sed Rate 9 0 - 36 mm/Hr   C-Reactive Protein    Collection Time: 04/03/23  4:46 PM   Result Value Ref Range    CRP 1.1 0.0 - 8.2 mg/L           No results found for: TBGOLDPLUS   Lab Results   Component Value Date    HEPAIGM Negative 04/28/2004    HEPBIGM Negative 04/28/2004    HEPCAB Negative 04/28/2004        Assessment:       1. Encounter for osteoporosis screening in asymptomatic postmenopausal patient    2. Undifferentiated connective tissue disease    3. Rheumatoid factor positive    4. Sicca syndrome    5. Long-term use of Plaquenil              Impression:     UCTD  Positive ELIEL, CCP antibody, RF, elevated immunoglobulin  Doing well on plaquenil 200 mg 3 days weekly  Occasionally with left middle finger proximal IP joint pain.      Sicca syndrome   Well controlled pilocarpine 5 mg daily, occ bid  Only uses as needed    Long term use plaquenil  Yearly eye exams  Tolerating well    Osteopenia   Openia on dexa 9/21/21  One falls since last appointment.    Denies prior fracture      Plan:          Continue home physical therapy exercises  Continue swimming exercises     Continue pilocarpine 5 mg daily to bid as needed    Continue plaquenil 200 mg three times weekly. If continues to do well, may further decrease or discontinue in the future  Annual eye exams    Recommend topical Voltaren 3-4 times daily as needed to affected joints    Update dexa 9/2023  Weight bearing activity as tolerated  Caution to avoid falls       Return to clinic 09/2023 with reg4, DEXA prior    Chelsea Malone PA-C  [facility]  Rheumatology       30 minutes of total time spent on the encounter, which includes face to face time and non-face to face time preparing to see the patient (eg, review of tests), Obtaining and/or reviewing separately obtained history, Documenting clinical information in the electronic or other health record, Independently interpreting results (not separately reported) and communicating results to the patient/family/caregiver, or Care coordination (not separately reported).

## 2023-05-29 ENCOUNTER — PATIENT MESSAGE (OUTPATIENT)
Dept: RHEUMATOLOGY | Facility: CLINIC | Age: 68
End: 2023-05-29
Payer: MEDICARE

## 2023-05-31 DIAGNOSIS — M35.00 SICCA SYNDROME: ICD-10-CM

## 2023-05-31 DIAGNOSIS — M35.9 UNDIFFERENTIATED CONNECTIVE TISSUE DISEASE: ICD-10-CM

## 2023-05-31 RX ORDER — PILOCARPINE HYDROCHLORIDE 5 MG/1
5 TABLET, FILM COATED ORAL 2 TIMES DAILY
Qty: 180 TABLET | Refills: 2 | Status: SHIPPED | OUTPATIENT
Start: 2023-05-31

## 2023-06-20 ENCOUNTER — PATIENT MESSAGE (OUTPATIENT)
Dept: ADMINISTRATIVE | Facility: OTHER | Age: 68
End: 2023-06-20
Payer: MEDICARE

## 2023-07-12 ENCOUNTER — PATIENT MESSAGE (OUTPATIENT)
Dept: FAMILY MEDICINE | Facility: CLINIC | Age: 68
End: 2023-07-12
Payer: MEDICARE

## 2023-08-01 ENCOUNTER — PES CALL (OUTPATIENT)
Dept: ADMINISTRATIVE | Facility: CLINIC | Age: 68
End: 2023-08-01
Payer: MEDICARE

## 2023-08-01 PROBLEM — M85.80 OSTEOPENIA: Status: ACTIVE | Noted: 2023-08-01

## 2023-08-02 ENCOUNTER — OFFICE VISIT (OUTPATIENT)
Dept: INTERNAL MEDICINE | Facility: CLINIC | Age: 68
End: 2023-08-02
Payer: MEDICARE

## 2023-08-02 ENCOUNTER — LAB VISIT (OUTPATIENT)
Dept: LAB | Facility: HOSPITAL | Age: 68
End: 2023-08-02
Attending: NURSE PRACTITIONER
Payer: MEDICARE

## 2023-08-02 VITALS
DIASTOLIC BLOOD PRESSURE: 76 MMHG | WEIGHT: 172.81 LBS | HEART RATE: 70 BPM | OXYGEN SATURATION: 98 % | HEIGHT: 62 IN | BODY MASS INDEX: 31.8 KG/M2 | SYSTOLIC BLOOD PRESSURE: 110 MMHG

## 2023-08-02 DIAGNOSIS — K51.30 ULCERATIVE RECTOSIGMOIDITIS WITHOUT COMPLICATION: Chronic | ICD-10-CM

## 2023-08-02 DIAGNOSIS — M35.00 SICCA SYNDROME: ICD-10-CM

## 2023-08-02 DIAGNOSIS — Z00.00 ENCOUNTER FOR PREVENTIVE HEALTH EXAMINATION: Primary | ICD-10-CM

## 2023-08-02 DIAGNOSIS — E78.5 HYPERLIPIDEMIA, UNSPECIFIED HYPERLIPIDEMIA TYPE: Chronic | ICD-10-CM

## 2023-08-02 DIAGNOSIS — Z79.899 ENCOUNTER FOR LONG-TERM CURRENT USE OF MEDICATION: ICD-10-CM

## 2023-08-02 DIAGNOSIS — M85.80 OSTEOPENIA, UNSPECIFIED LOCATION: ICD-10-CM

## 2023-08-02 DIAGNOSIS — I10 ESSENTIAL HYPERTENSION: Chronic | ICD-10-CM

## 2023-08-02 DIAGNOSIS — M35.9 UNDIFFERENTIATED CONNECTIVE TISSUE DISEASE: ICD-10-CM

## 2023-08-02 LAB
ESTIMATED AVG GLUCOSE: 111 MG/DL (ref 68–131)
HBA1C MFR BLD: 5.5 % (ref 4–5.6)

## 2023-08-02 PROCEDURE — 99999 PR PBB SHADOW E&M-EST. PATIENT-LVL IV: ICD-10-PCS | Mod: PBBFAC,,, | Performed by: NURSE PRACTITIONER

## 2023-08-02 PROCEDURE — G0439 PPPS, SUBSEQ VISIT: HCPCS | Mod: ,,, | Performed by: NURSE PRACTITIONER

## 2023-08-02 PROCEDURE — 36415 COLL VENOUS BLD VENIPUNCTURE: CPT | Performed by: NURSE PRACTITIONER

## 2023-08-02 PROCEDURE — 99999 PR PBB SHADOW E&M-EST. PATIENT-LVL IV: CPT | Mod: PBBFAC,,, | Performed by: NURSE PRACTITIONER

## 2023-08-02 PROCEDURE — 99214 OFFICE O/P EST MOD 30 MIN: CPT | Mod: PBBFAC | Performed by: NURSE PRACTITIONER

## 2023-08-02 PROCEDURE — G0439 PR MEDICARE ANNUAL WELLNESS SUBSEQUENT VISIT: ICD-10-PCS | Mod: ,,, | Performed by: NURSE PRACTITIONER

## 2023-08-02 PROCEDURE — 83036 HEMOGLOBIN GLYCOSYLATED A1C: CPT | Performed by: NURSE PRACTITIONER

## 2023-08-02 NOTE — PROGRESS NOTES
"  Juanita Walton presented for a  Medicare AWV and comprehensive Health Risk Assessment today. The following components were reviewed and updated:    Medical history  Family History  Social history  Allergies and Current Medications  Health Risk Assessment  Health Maintenance  Care Team         ** See Completed Assessments for Annual Wellness Visit within the encounter summary.**         The following assessments were completed:  Living Situation  CAGE  Depression Screening  Timed Get Up and Go  Whisper Test  Cognitive Function Screening  Nutrition Screening  ADL Screening  PAQ Screening        Vitals:    08/02/23 1046   BP: 110/76   Pulse: 70   SpO2: 98%   Weight: 78.4 kg (172 lb 13.5 oz)   Height: 5' 2" (1.575 m)     Body mass index is 31.61 kg/m².  Physical Exam  Vitals and nursing note reviewed.   Constitutional:       Appearance: She is well-developed.   HENT:      Head: Normocephalic.   Cardiovascular:      Rate and Rhythm: Normal rate and regular rhythm.      Pulses:           Dorsalis pedis pulses are 2+ on the right side and 2+ on the left side.      Heart sounds: Normal heart sounds.   Pulmonary:      Effort: Pulmonary effort is normal. No respiratory distress.      Breath sounds: Normal breath sounds.   Abdominal:      Palpations: Abdomen is soft. There is no mass.      Tenderness: There is no abdominal tenderness.   Musculoskeletal:         General: Normal range of motion.   Skin:     General: Skin is warm and dry.   Neurological:      Mental Status: She is alert and oriented to person, place, and time.      Motor: No abnormal muscle tone.   Psychiatric:         Speech: Speech normal.         Behavior: Behavior normal.               Diagnoses and health risks identified today and associated recommendations/orders:    1. Encounter for preventive health examination     Review for Opioid Screening: Pt does not have Rx for Opioids      Review for Substance Use Disorders: Alcohol use, see flow sheet for " detail No drug use per chart     Discussed locations to receive COVID booster Discussed receiving Shingrix at pharmacy.    Discussed s/s of heart failure (patient denies any s/s) and advised to follow up with PCP/ER (if severe) if occur. Patient expressed understanding.      2. Undifferentiated connective tissue disease  Stable per pt  Continue current treatment plan as previously prescribed with your rheumatologist.     3. Sicca syndrome  Stable per pt  Continue current treatment plan as previously prescribed with your rheumatologist.     4. Ulcerative rectosigmoiditis without complication  Stable per pt  Continue current treatment plan as previously prescribed with your  GI provider.     5. Essential hypertension  Continue current treatment plan as previously prescribed with your  pcp    6. Hyperlipidemia, unspecified hyperlipidemia type  Continue current treatment plan as previously prescribed with your pcp    7. Osteopenia, unspecified location  Dexa 9/21  Continue current treatment plan as previously prescribed with your pcp    8. Encounter for long-term current use of medication  - HEMOGLOBIN A1C; Future      Provided Juanita with a 5-10 year written screening schedule and personal prevention plan. Recommendations were developed using the USPSTF age appropriate recommendations. Education, counseling, and referrals were provided as needed. After Visit Summary printed and given to patient which includes a list of additional screenings\tests needed.    Follow up in about 1 year (around 8/2/2024) for awv.    Hui Butler NP  I offered to discuss advanced care planning, including how to pick a person who would make decisions for you if you were unable to make them for yourself, called a health care power of , and what kind of decisions you might make such as use of life sustaining treatments such as ventilators and tube feeding when faced with a life limiting illness recorded on a living will that they  will need to know. (How you want to be cared for as you near the end of your natural life)     X Patient is interested in learning more about how to make advanced directives.  I provided them paperwork and offered to discuss this with them.

## 2023-08-02 NOTE — PATIENT INSTRUCTIONS
Counseling and Referral of Other Preventative  (Italic type indicates deductible and co-insurance are waived)    Patient Name: Juanita Walton  Today's Date: 8/2/2023    Health Maintenance       Date Due Completion Date    Hemoglobin A1c (Diabetic Prevention Screening) Never done ---    Shingles Vaccine (1 of 2) Never done ---    COVID-19 Vaccine (5 - Pfizer series) 10/05/2022 8/10/2022    Mammogram 10/13/2023 10/13/2022    Influenza Vaccine (1) 09/01/2023 11/30/2022    DEXA Scan 09/21/2024 9/21/2021    Lipid Panel 10/04/2027 10/4/2022    TETANUS VACCINE 12/14/2027 12/14/2017    Colorectal Cancer Screening 11/16/2032 11/16/2022    Override on 3/4/2016: Done        Orders Placed This Encounter   Procedures    HEMOGLOBIN A1C     The following information is provided to all patients.  This information is to help you find resources for any of the problems found today that may be affecting your health:                Living healthy guide: www.[facility].[place].gov      Understanding Diabetes: www.diabetes.org      Eating healthy: www.cdc.gov/healthyweight      CDC home safety checklist: www.cdc.gov/steadi/patient.html      Agency on Aging: www.goea.[place].gov      Alcoholics anonymous (AA): www.aa.org      Physical Activity: www.britton.nih.gov/mh1jzua      Tobacco use: www.quitwithusla.org

## 2023-09-07 ENCOUNTER — APPOINTMENT (OUTPATIENT)
Dept: RADIOLOGY | Facility: HOSPITAL | Age: 68
End: 2023-09-07
Payer: MEDICARE

## 2023-09-07 ENCOUNTER — OFFICE VISIT (OUTPATIENT)
Dept: RHEUMATOLOGY | Facility: CLINIC | Age: 68
End: 2023-09-07
Payer: MEDICARE

## 2023-09-07 VITALS
HEIGHT: 62 IN | SYSTOLIC BLOOD PRESSURE: 131 MMHG | DIASTOLIC BLOOD PRESSURE: 73 MMHG | BODY MASS INDEX: 31.72 KG/M2 | HEART RATE: 61 BPM | WEIGHT: 172.38 LBS

## 2023-09-07 DIAGNOSIS — E55.9 VITAMIN D DEFICIENCY: ICD-10-CM

## 2023-09-07 DIAGNOSIS — Z78.0 ENCOUNTER FOR OSTEOPOROSIS SCREENING IN ASYMPTOMATIC POSTMENOPAUSAL PATIENT: ICD-10-CM

## 2023-09-07 DIAGNOSIS — Z13.820 ENCOUNTER FOR OSTEOPOROSIS SCREENING IN ASYMPTOMATIC POSTMENOPAUSAL PATIENT: ICD-10-CM

## 2023-09-07 DIAGNOSIS — M35.00 SICCA SYNDROME: ICD-10-CM

## 2023-09-07 DIAGNOSIS — M35.9 UNDIFFERENTIATED CONNECTIVE TISSUE DISEASE: Primary | ICD-10-CM

## 2023-09-07 DIAGNOSIS — Z79.899 LONG-TERM USE OF PLAQUENIL: ICD-10-CM

## 2023-09-07 DIAGNOSIS — R76.8 RHEUMATOID FACTOR POSITIVE: ICD-10-CM

## 2023-09-07 PROCEDURE — 99999 PR PBB SHADOW E&M-EST. PATIENT-LVL III: ICD-10-PCS | Mod: PBBFAC,,,

## 2023-09-07 PROCEDURE — 77080 DXA BONE DENSITY AXIAL: CPT | Mod: TC

## 2023-09-07 PROCEDURE — 99214 PR OFFICE/OUTPT VISIT, EST, LEVL IV, 30-39 MIN: ICD-10-PCS | Mod: S$PBB,,,

## 2023-09-07 PROCEDURE — 99214 OFFICE O/P EST MOD 30 MIN: CPT | Mod: S$PBB,,,

## 2023-09-07 PROCEDURE — 77080 DXA BONE DENSITY AXIAL SKELETON 1 OR MORE SITES: ICD-10-PCS | Mod: 26,,, | Performed by: RADIOLOGY

## 2023-09-07 PROCEDURE — 99213 OFFICE O/P EST LOW 20 MIN: CPT | Mod: PBBFAC,25

## 2023-09-07 PROCEDURE — 99999 PR PBB SHADOW E&M-EST. PATIENT-LVL III: CPT | Mod: PBBFAC,,,

## 2023-09-07 PROCEDURE — 77080 DXA BONE DENSITY AXIAL: CPT | Mod: 26,,, | Performed by: RADIOLOGY

## 2023-09-07 RX ORDER — HYDROXYCHLOROQUINE SULFATE 200 MG/1
TABLET, FILM COATED ORAL
Qty: 90 TABLET | Refills: 0
Start: 2023-09-07

## 2023-09-07 NOTE — PROGRESS NOTES
"         RHEUMATOLOGY OUTPATIENT CLINIC NOTE    09/07/2023    Subjective:       Patient ID: Juanita Walton is a 68 y.o. female.    Chief Complaint: undifferentiated connective tissue disease       Juanita Walton is a 68 y.o. pleasant female here for rheumatology follow up for undifferentiated connective tissue disease.  Currently on Plaquenil 200 mg three times weekly and is doing well.  Pilocarpine only as needed 5 mg once-twice daily for dry mouth, occasionally bid.  No significant swelling.  Currently without joint pain, no morning stiffness.    No falls or fractures since last appointment.   Rheumatologic ROS negative otherwise.     Physical exam with no swelling of any joints, no synovitis, no tenderness to palpation, full range of motion tarun ue/le. No dactylitis, no enthesitis, no effusion, no parotitis.          Objective:       /73 (BP Location: Left arm, Patient Position: Sitting, BP Method: Medium (Automatic))   Pulse 61   Ht 5' 2" (1.575 m)   Wt 78.2 kg (172 lb 6.4 oz)   BMI 31.53 kg/m²        No results found for this or any previous visit (from the past 672 hour(s)).         DEXA 09/07/2023:   Spine-0.4, L2-0.9, FN -0.9, TH-0.4, normal BMD      Assessment:       1. Undifferentiated connective tissue disease    2. Vitamin D deficiency    3. Sicca syndrome    4. Encounter for osteoporosis screening in asymptomatic postmenopausal patient    5. Long-term use of Plaquenil    6. Rheumatoid factor positive          Impression:     UCTD  Positive ELIEL, + CCP antibody 136.2, RF 71.0, elevated immunoglobulin  Doing well on plaquenil 200 mg 3 days weekly  Occasionally with left middle finger proximal IP joint pain.      Sicca syndrome   Well controlled pilocarpine 5 mg 1-2xdaily    Long term use plaquenil  Yearly eye exams  Tolerating well    Osteopenia   Normal BMD on DEXA 09/07/2023  No falls since last appointment.    Denies prior fracture      Plan:          UCTD  Continue plaquenil 200 mg three " times weekly.   Annual eye exams  Sicca  Continue pilocarpine 5 mg daily to bid as needed    Vitamin D deficiency  Update level today   If low start 50K weekly  If low-normal range start OTC vit D 1,000-2,000 units daily.    Arthralgia  Continue home physical therapy exercises  Continue swimming exercises   Recommend topical Voltaren 3-4 times daily as needed to affected joints    Osteoporosis screening  For now no need to treat   Update dexa 9/2026  Weight bearing activity as tolerated  Caution to avoid falls     Update reg4, vit D today  RTC 4 months with reg4 prior    Chelsea Malone PA-C  Rheumatology Department   [facility]         Disclaimer: This note was prepared using voice recognition system and is likely to have sound alike errors and is not proof read.  Please call me with any questions

## 2023-10-04 ENCOUNTER — IMMUNIZATION (OUTPATIENT)
Dept: INTERNAL MEDICINE | Facility: CLINIC | Age: 68
End: 2023-10-04
Payer: MEDICARE

## 2023-10-04 PROCEDURE — 99999PBSHW FLU VACCINE - QUADRIVALENT - ADJUVANTED: ICD-10-PCS | Mod: PBBFAC,,,

## 2023-10-04 PROCEDURE — G0008 ADMIN INFLUENZA VIRUS VAC: HCPCS | Mod: PBBFAC,PO

## 2023-10-04 PROCEDURE — 99999PBSHW FLU VACCINE - QUADRIVALENT - ADJUVANTED: Mod: PBBFAC,,,

## 2023-10-04 RX ORDER — OLMESARTAN MEDOXOMIL / AMLODIPINE BESYLATE / HYDROCHLOROTHIAZIDE 40; 10; 12.5 MG/1; MG/1; MG/1
TABLET, FILM COATED ORAL
Qty: 90 TABLET | Refills: 0 | Status: SHIPPED | OUTPATIENT
Start: 2023-10-04

## 2023-10-04 NOTE — TELEPHONE ENCOUNTER
No care due was identified.  [facility] Embedded Care Due Messages. Reference number: [id number].   10/04/2023 5:59:58 AM CDT

## 2023-10-04 NOTE — TELEPHONE ENCOUNTER
Refill Decision Note   Juanita Walton  is requesting a refill authorization.  Brief Assessment and Rationale for Refill:  Approve     Medication Therapy Plan:         Comments:     Note composed:7:47 AM 10/04/2023             Appointments     Last Visit   12/13/2022 Rosalba Marrero MD   Next Visit   10/31/2023 Rosalba Marrero MD

## 2023-10-31 ENCOUNTER — OFFICE VISIT (OUTPATIENT)
Dept: FAMILY MEDICINE | Facility: CLINIC | Age: 68
End: 2023-10-31
Payer: MEDICARE

## 2023-10-31 VITALS
HEIGHT: 62 IN | OXYGEN SATURATION: 98 % | BODY MASS INDEX: 32.49 KG/M2 | RESPIRATION RATE: 18 BRPM | TEMPERATURE: 98 F | SYSTOLIC BLOOD PRESSURE: 124 MMHG | DIASTOLIC BLOOD PRESSURE: 70 MMHG | HEART RATE: 89 BPM | WEIGHT: 176.56 LBS

## 2023-10-31 DIAGNOSIS — Z01.419 WELL FEMALE EXAM WITH ROUTINE GYNECOLOGICAL EXAM: ICD-10-CM

## 2023-10-31 DIAGNOSIS — K51.30 ULCERATIVE RECTOSIGMOIDITIS WITHOUT COMPLICATION: Chronic | ICD-10-CM

## 2023-10-31 DIAGNOSIS — Z12.31 ENCOUNTER FOR SCREENING MAMMOGRAM FOR MALIGNANT NEOPLASM OF BREAST: ICD-10-CM

## 2023-10-31 DIAGNOSIS — E78.5 HYPERLIPIDEMIA, UNSPECIFIED HYPERLIPIDEMIA TYPE: Chronic | ICD-10-CM

## 2023-10-31 DIAGNOSIS — I10 ESSENTIAL HYPERTENSION: Primary | Chronic | ICD-10-CM

## 2023-10-31 PROCEDURE — 99214 PR OFFICE/OUTPT VISIT, EST, LEVL IV, 30-39 MIN: ICD-10-PCS | Mod: 25,S$PBB,, | Performed by: FAMILY MEDICINE

## 2023-10-31 PROCEDURE — G0101 CA SCREEN;PELVIC/BREAST EXAM: HCPCS | Mod: PBBFAC,PO | Performed by: FAMILY MEDICINE

## 2023-10-31 PROCEDURE — 99214 OFFICE O/P EST MOD 30 MIN: CPT | Mod: 25,S$PBB,, | Performed by: FAMILY MEDICINE

## 2023-10-31 PROCEDURE — 99999 PR PBB SHADOW E&M-EST. PATIENT-LVL IV: ICD-10-PCS | Mod: PBBFAC,,, | Performed by: FAMILY MEDICINE

## 2023-10-31 PROCEDURE — G0101 CA SCREEN;PELVIC/BREAST EXAM: HCPCS | Mod: S$PBB,,, | Performed by: FAMILY MEDICINE

## 2023-10-31 PROCEDURE — 99999 PR PBB SHADOW E&M-EST. PATIENT-LVL IV: CPT | Mod: PBBFAC,,, | Performed by: FAMILY MEDICINE

## 2023-10-31 PROCEDURE — G0101 PR CA SCREEN;PELVIC/BREAST EXAM: ICD-10-PCS | Mod: S$PBB,,, | Performed by: FAMILY MEDICINE

## 2023-10-31 PROCEDURE — 99214 OFFICE O/P EST MOD 30 MIN: CPT | Mod: PBBFAC,PO | Performed by: FAMILY MEDICINE

## 2023-10-31 NOTE — PROGRESS NOTES
CHIEF COMPLAINT:  This is a 68-year-old female here for preventive health exam.     SUBJECTIVE: Patient is doing well without complaints. Patient has hypertension for which she takes a combination of amlodipine 10 mg, olmesartan 40 mg and HCTZ 12.5 mg daily.  Blood pressure is controlled with today's reading 124/70.  She takes atorvastatin 10 mg daily for hyperlipidemia.  Patient is followed by a cardiologist.  Patient has sicca syndrome and undifferentiated connective tissue disease for which she takes hydroxychloroquine and pilocarpine.  She has ulcerative colitis and is followed by GI.  She takes Entyvio every 8 weeks.  Patient had HPV type 16 and other high risk types on Pap in June 2019.  Colposcopy in June 2019 was negative.      Eye exam January 2023.   Pap smear September 2021 due again in 2024.  Mammogram September 2021.  Bone DEXA scan September 2023. Colonoscopy June 2021.  Immunizations: Influenza vaccine October 2023.  Prevnar January 2017.  Tdap December 2017.  COVID 19 vaccine March, April, December 2021, August 2022.     ROS:  GENERAL: Patient denies fever, chills, night sweats.  Patient denies weight gain or loss. Patient denies anorexia, fatigue, weakness or swollen glands.  SKIN: Patient denies rash or hair loss.  HEENT: Patient denies sore throat, ear pain, hearing loss, nasal congestion, or runny nose. Patient denies visual disturbance, eye irritation or discharge.  LUNGS: Patient denies cough, wheeze or hemoptysis.  CARDIOVASCULAR: Patient denies chest pain, shortness of breath, palpitations, syncope or lower extremity edema.  GI: Patient denies abdominal pain, nausea, vomiting, diarrhea, constipation, blood in stool or melena.  GENITOURINARY: Patient denies pelvic pain, vaginal discharge, itch or odor. Patient denies irregular vaginal bleeding.  Patient denies dysuria, frequency, hematuria, nocturia, urgency or incontinence.  BREASTS: Patient denies breast pain, mass or nipple  discharge.  MUSCULOSKELETAL: Patient denies joint pain, swelling, redness or warmth.  NEUROLOGIC: Patient denies headache, vertigo, paresthesias, weakness in limb, dysarthria, dysphagia or abnormality of gait.  PSYCHIATRIC: Patient denies anxiety, depression, or memory loss.     OBJECTIVE:   GENERAL: Well-developed well-nourished slightly overweight black female alert and oriented x3, in no acute distress.  Memory, judgment and cognition without deficit.  Weight gain of 8 pounds in the last year.  SKIN:  Fine maculopapular rash on chin and lower face.  Normal color and tone.  HEENT: Eyes: Clear conjunctivae. No scleral icterus.  Pupils equal reactive to light and accommodation.  Ears: Clear canals. Clear TMs.  Nose: Without congestion.  Pharynx: Without injection or exudates.  NECK: Supple, normal range of motion.  No masses, lymphadenopathy or enlarged thyroid.  No JVD.  Carotids 2+ and equal.  No bruits.  LUNGS: Clear to auscultation.  Normal respiratory effort.  CARDIOVASCULAR:  Regular rhythm, normal S1, S2 without murmur, gallop or rub.  BACK:  No CVA or spinal tenderness.  BREASTS:  No masses, tenderness or nipple discharge.  ABDOMEN: Normal appearance.  Active bowel sounds.  Soft, nontender without mass or organomegaly.  No rebound or guarding.  EXTREMITIES: Without cyanosis, clubbing or edema.  Distal pulses 2+ and equal.  Normal range of motion in all extremities.  No joint effusion, erythema or warmth.  Bilateral bunions.  NEUROLOGIC:   Cranial nerves II through XII without deficit.  Motor strength equal bilaterally.  Sensation normal to touch.  Deep tendon reflexes 2+ and equal.  Gait without abnormality.  No tremor.  Negative cerebellar signs.  PELVIC:  External: Without lesions or inflammation.  Urethral meatus normal size and shape Vaginal: Clear discharge, atrophic changes.  Cervix: Normal appearance.  Friable.  No motion tenderness. No Pap  Uterus: Normal size and shape.  Adnexa: Non-tender, without  masses.  Rectovaginal: Confirms.  Normal anal sphincter tone.  Heme-negative stool x3.    ASSESSMENT:  1. Essential hypertension    2. Hyperlipidemia, unspecified hyperlipidemia type    3. 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 Encounter for screening mammogram for malignant neoplasm of breast    5. Well female exam with routine gynecological exam      PLAN:  1. Weight reduction. Exercise regularly.  2. Age-appropriate counseling.  3. Fasting lab.  4. Mammogram.  5. Refill medication as needed.  6. Follow-up annually.    30 minutes of total time spent on the encounter, which includes face to face time and non-face to face time preparing to see the patient (eg, review of tests), Obtaining and/or reviewing separately obtained history, Documenting clinical information in the electronic or other health record, Independently interpreting results (not separately reported) and communicating results to the patient/family/caregiver, or Care coordination (not separately reported).     This note is generated with speech recognition software and is subject to transcription error and sound alike phrases that may be missed by proofreading.

## 2023-11-07 ENCOUNTER — LAB VISIT (OUTPATIENT)
Dept: LAB | Facility: HOSPITAL | Age: 68
End: 2023-11-07
Attending: FAMILY MEDICINE
Payer: MEDICARE

## 2023-11-07 DIAGNOSIS — E78.5 HYPERLIPIDEMIA, UNSPECIFIED HYPERLIPIDEMIA TYPE: Chronic | ICD-10-CM

## 2023-11-07 LAB
CHOLEST SERPL-MCNC: 174 MG/DL (ref 120–199)
CHOLEST/HDLC SERPL: 4.1 {RATIO} (ref 2–5)
HDLC SERPL-MCNC: 42 MG/DL (ref 40–75)
HDLC SERPL: 24.1 % (ref 20–50)
LDLC SERPL CALC-MCNC: 114.4 MG/DL (ref 63–159)
NONHDLC SERPL-MCNC: 132 MG/DL
TRIGL SERPL-MCNC: 88 MG/DL (ref 30–150)
TSH SERPL DL<=0.005 MIU/L-ACNC: 1.56 UIU/ML (ref 0.4–4)

## 2023-11-07 PROCEDURE — 36415 COLL VENOUS BLD VENIPUNCTURE: CPT | Mod: PO | Performed by: FAMILY MEDICINE

## 2023-11-07 PROCEDURE — 84443 ASSAY THYROID STIM HORMONE: CPT | Performed by: FAMILY MEDICINE

## 2023-11-07 PROCEDURE — 80061 LIPID PANEL: CPT | Performed by: FAMILY MEDICINE

## 2023-11-30 ENCOUNTER — HOSPITAL ENCOUNTER (OUTPATIENT)
Dept: RADIOLOGY | Facility: HOSPITAL | Age: 68
Discharge: HOME | End: 2023-11-30
Attending: FAMILY MEDICINE
Payer: MEDICARE

## 2023-11-30 VITALS — WEIGHT: 176.56 LBS | HEIGHT: 62 IN | BODY MASS INDEX: 32.49 KG/M2

## 2023-11-30 DIAGNOSIS — Z12.31 ENCOUNTER FOR SCREENING MAMMOGRAM FOR MALIGNANT NEOPLASM OF BREAST: ICD-10-CM

## 2023-11-30 PROCEDURE — 77063 MAMMO DIGITAL SCREENING BILAT WITH TOMO: ICD-10-PCS | Mod: 26,,, | Performed by: RADIOLOGY

## 2023-11-30 PROCEDURE — 77063 BREAST TOMOSYNTHESIS BI: CPT | Mod: 26,,, | Performed by: RADIOLOGY

## 2023-11-30 PROCEDURE — 77067 SCR MAMMO BI INCL CAD: CPT | Mod: TC

## 2023-11-30 PROCEDURE — 77067 MAMMO DIGITAL SCREENING BILAT WITH TOMO: ICD-10-PCS | Mod: 26,,, | Performed by: RADIOLOGY

## 2023-11-30 PROCEDURE — 77067 SCR MAMMO BI INCL CAD: CPT | Mod: 26,,, | Performed by: RADIOLOGY

## 2023-12-05 LAB — CRC RECOMMENDATION EXT: NORMAL

## 2023-12-13 ENCOUNTER — PATIENT OUTREACH (OUTPATIENT)
Dept: ADMINISTRATIVE | Facility: HOSPITAL | Age: 68
End: 2023-12-13
Payer: MEDICARE

## 2023-12-13 NOTE — LETTER
[id number]    AUTHORIZATION FOR RELEASE OF   CONFIDENTIAL INFORMATION    Dr. Raymond,     We are seeing Juanita Walton, date of birth 1955, in the clinic at [facility]. Rosalba Marrero MD is the patient's PCP. Juanita Walton has an outstanding lab/procedure at the time we reviewed her chart. In order to help keep her health information updated, she has authorized us to request the following medical record(s):       ( x ) 2023 COLONOSCOPY REPORT            Please fax records to [phone]     If you have any questions, please contact    GUANAKO Shell at [phone]          Patient Name: Juanita Walton  : 1955  Patient Phone #: [phone]

## 2023-12-13 NOTE — LETTER
Tadeo Raymond MD    AUTHORIZATION FOR RELEASE OF   CONFIDENTIAL INFORMATION      We are seeing Juanita Walton, date of birth 1955, in the clinic at [facility]. Rosalba Marrero MD is the patient's PCP. Juanita Walton has an outstanding lab/procedure at the time we reviewed her chart. In order to help keep her health information updated, she has authorized us to request the following medical record(s):        (  )  MAMMOGRAM                                      (  )  COLONOSCOPY      (  )  PAP SMEAR                                          (  )  OUTSIDE LAB RESULTS     (  )  DEXA SCAN                                          (  )  EYE EXAM            (  )  FOOT EXAM                                          (  )  ENTIRE RECORD     (  )  OUTSIDE IMMUNIZATIONS                 ( x ) Colon repeat recommendation         Please fax records to [facility], Muratore, Karen A., MD, [phone]     If you have any questions, please contact Ashely KRISHNAN at [phone].           Patient Name: Juanita Walton  : 1955  Patient Phone #: [phone]

## 2023-12-13 NOTE — PROGRESS NOTES
Manually uploaded COLON PATHOLOGY 12/5/2023 to [employer]. SHIRA faxed to Dr. Mandi kohler to request colonoscopy record. Reminder set 1 week.

## 2024-01-01 ENCOUNTER — PATIENT MESSAGE (OUTPATIENT)
Dept: ADMINISTRATIVE | Facility: OTHER | Age: 69
End: 2024-01-01
Payer: MEDICARE

## 2024-01-17 ENCOUNTER — OFFICE VISIT (OUTPATIENT)
Dept: OPHTHALMOLOGY | Facility: CLINIC | Age: 69
End: 2024-01-17
Payer: MEDICARE

## 2024-01-17 DIAGNOSIS — I10 ESSENTIAL HYPERTENSION: Chronic | ICD-10-CM

## 2024-01-17 DIAGNOSIS — M35.9 UNDIFFERENTIATED CONNECTIVE TISSUE DISEASE: Primary | ICD-10-CM

## 2024-01-17 DIAGNOSIS — Z79.899 LONG-TERM USE OF PLAQUENIL: ICD-10-CM

## 2024-01-17 DIAGNOSIS — H52.7 REFRACTIVE ERRORS: ICD-10-CM

## 2024-01-17 DIAGNOSIS — H25.13 CATARACT, NUCLEAR SCLEROTIC SENILE, BILATERAL: ICD-10-CM

## 2024-01-17 PROCEDURE — 99999 PR PBB SHADOW E&M-EST. PATIENT-LVL II: CPT | Mod: PBBFAC,,, | Performed by: OPTOMETRIST

## 2024-01-17 PROCEDURE — 92083 EXTENDED VISUAL FIELD XM: CPT | Mod: PBBFAC | Performed by: OPTOMETRIST

## 2024-01-17 PROCEDURE — 99212 OFFICE O/P EST SF 10 MIN: CPT | Mod: PBBFAC | Performed by: OPTOMETRIST

## 2024-01-17 PROCEDURE — 92014 COMPRE OPH EXAM EST PT 1/>: CPT | Mod: S$PBB,,, | Performed by: OPTOMETRIST

## 2024-01-17 PROCEDURE — 92015 DETERMINE REFRACTIVE STATE: CPT | Mod: ,,, | Performed by: OPTOMETRIST

## 2024-01-17 PROCEDURE — 92134 CPTRZ OPH DX IMG PST SGM RTA: CPT | Mod: PBBFAC | Performed by: OPTOMETRIST

## 2024-01-17 NOTE — PROGRESS NOTES
SUBJECTIVE  Juanita Walton is 68 y.o. female  Corrected distance visual acuity was 20/30 -2 in the right eye and 20/40 in the left eye. Corrected near visual acuity was J1 in the right eye and J1 in the left eye.   Chief Complaint   Patient presents with    Hypertensive Eye Exam    Eye Exam          HPI    Review 10-2 and MOCT.  Decrease night visual acuity.  Last eye exam 01/12/2023 TRF.  Update glasses RX.    Last edited by Hanna Alvarado MA on 1/17/2024  9:41 AM.         Assessment /Plan :  1. Undifferentiated connective tissue disease   See number 2     2. Long-term use of Plaquenil   No active anterior or posterior uveitis OU.  No ocular changes from Plaquenil use  Normal VF and mOCT OU     3. Essential hypertension   No HTN Retinopathy     4. Cataract, nuclear sclerotic senile, bilateral   Moderate cataracts OU. Cataracts are not significantly affecting activities of daily living and therefore surgery is not indicated at this time.   Will continue to monitor annually. Pt to call or RTC with any significant change in vision prior to next visit.      5. Refractive errors   Dispense Final Rx for glasses.  RTC 1 year  Discussed above and answered questions.

## 2024-01-22 ENCOUNTER — PATIENT MESSAGE (OUTPATIENT)
Dept: OPHTHALMOLOGY | Facility: CLINIC | Age: 69
End: 2024-01-22
Payer: MEDICARE

## 2024-01-26 NOTE — PROGRESS NOTES
Record request sent x 3.     TELE/RN LASIX 20MG 5:30PM HELD





LASIX 20MG 5:30PM HELD SECONDARY TO LASIX 40MG IV PUSH TIMES ONE GIVEN AT 5PM

## 2024-01-31 ENCOUNTER — LAB VISIT (OUTPATIENT)
Dept: LAB | Facility: HOSPITAL | Age: 69
End: 2024-01-31
Attending: FAMILY MEDICINE
Payer: MEDICARE

## 2024-01-31 DIAGNOSIS — M35.9 UNDIFFERENTIATED CONNECTIVE TISSUE DISEASE: ICD-10-CM

## 2024-01-31 LAB
ALBUMIN SERPL BCP-MCNC: 3.8 G/DL (ref 3.5–5.2)
ALP SERPL-CCNC: 64 U/L (ref 55–135)
ALT SERPL W/O P-5'-P-CCNC: 22 U/L (ref 10–44)
ANION GAP SERPL CALC-SCNC: 7 MMOL/L (ref 8–16)
AST SERPL-CCNC: 30 U/L (ref 10–40)
BASOPHILS # BLD AUTO: 0.03 K/UL (ref 0–0.2)
BASOPHILS NFR BLD: 0.8 % (ref 0–1.9)
BILIRUB SERPL-MCNC: 0.5 MG/DL (ref 0.1–1)
BUN SERPL-MCNC: 13 MG/DL (ref 8–23)
CALCIUM SERPL-MCNC: 9.4 MG/DL (ref 8.7–10.5)
CHLORIDE SERPL-SCNC: 107 MMOL/L (ref 95–110)
CO2 SERPL-SCNC: 27 MMOL/L (ref 23–29)
CREAT SERPL-MCNC: 1 MG/DL (ref 0.5–1.4)
CRP SERPL-MCNC: 1.3 MG/L (ref 0–8.2)
DIFFERENTIAL METHOD BLD: ABNORMAL
EOSINOPHIL # BLD AUTO: 0.1 K/UL (ref 0–0.5)
EOSINOPHIL NFR BLD: 3.4 % (ref 0–8)
ERYTHROCYTE [DISTWIDTH] IN BLOOD BY AUTOMATED COUNT: 12 % (ref 11.5–14.5)
ERYTHROCYTE [SEDIMENTATION RATE] IN BLOOD BY WESTERGREN METHOD: 26 MM/HR (ref 0–20)
EST. GFR  (NO RACE VARIABLE): >60 ML/MIN/1.73 M^2
GLUCOSE SERPL-MCNC: 84 MG/DL (ref 70–110)
HCT VFR BLD AUTO: 37.8 % (ref 37–48.5)
HGB BLD-MCNC: 13.1 G/DL (ref 12–16)
IMM GRANULOCYTES # BLD AUTO: 0.01 K/UL (ref 0–0.04)
IMM GRANULOCYTES NFR BLD AUTO: 0.3 % (ref 0–0.5)
LYMPHOCYTES # BLD AUTO: 1.4 K/UL (ref 1–4.8)
LYMPHOCYTES NFR BLD: 39.4 % (ref 18–48)
MCH RBC QN AUTO: 31.2 PG (ref 27–31)
MCHC RBC AUTO-ENTMCNC: 34.7 G/DL (ref 32–36)
MCV RBC AUTO: 90 FL (ref 82–98)
MONOCYTES # BLD AUTO: 0.4 K/UL (ref 0.3–1)
MONOCYTES NFR BLD: 12 % (ref 4–15)
NEUTROPHILS # BLD AUTO: 1.6 K/UL (ref 1.8–7.7)
NEUTROPHILS NFR BLD: 44.1 % (ref 38–73)
NRBC BLD-RTO: 0 /100 WBC
PLATELET # BLD AUTO: 207 K/UL (ref 150–450)
PMV BLD AUTO: 10.2 FL (ref 9.2–12.9)
POTASSIUM SERPL-SCNC: 3.8 MMOL/L (ref 3.5–5.1)
PROT SERPL-MCNC: 7.7 G/DL (ref 6–8.4)
RBC # BLD AUTO: 4.2 M/UL (ref 4–5.4)
SODIUM SERPL-SCNC: 141 MMOL/L (ref 136–145)
WBC # BLD AUTO: 3.58 K/UL (ref 3.9–12.7)

## 2024-01-31 PROCEDURE — 85651 RBC SED RATE NONAUTOMATED: CPT

## 2024-01-31 PROCEDURE — 80053 COMPREHEN METABOLIC PANEL: CPT

## 2024-01-31 PROCEDURE — 86140 C-REACTIVE PROTEIN: CPT

## 2024-01-31 PROCEDURE — 85025 COMPLETE CBC W/AUTO DIFF WBC: CPT

## 2024-01-31 PROCEDURE — 36415 COLL VENOUS BLD VENIPUNCTURE: CPT

## 2024-02-05 NOTE — PROGRESS NOTES
No repeat recommendation received from GI provider, colonoscopy uploaded to  with repeat date of 3 yrs.

## 2024-02-05 NOTE — PROGRESS NOTES
Duplicate response from [facility] [employer], No record found for patient. Sent message CC to LPN

## 2024-02-09 ENCOUNTER — OFFICE VISIT (OUTPATIENT)
Dept: RHEUMATOLOGY | Facility: CLINIC | Age: 69
End: 2024-02-09
Payer: MEDICARE

## 2024-02-09 VITALS
BODY MASS INDEX: 32.49 KG/M2 | HEART RATE: 71 BPM | DIASTOLIC BLOOD PRESSURE: 72 MMHG | WEIGHT: 176.56 LBS | SYSTOLIC BLOOD PRESSURE: 138 MMHG | HEIGHT: 62 IN

## 2024-02-09 DIAGNOSIS — M35.9 UNDIFFERENTIATED CONNECTIVE TISSUE DISEASE: Primary | ICD-10-CM

## 2024-02-09 DIAGNOSIS — M35.00 SICCA SYNDROME: ICD-10-CM

## 2024-02-09 PROCEDURE — 99999 PR PBB SHADOW E&M-EST. PATIENT-LVL III: CPT | Mod: PBBFAC,,, | Performed by: STUDENT IN AN ORGANIZED HEALTH CARE EDUCATION/TRAINING PROGRAM

## 2024-02-09 PROCEDURE — 99213 OFFICE O/P EST LOW 20 MIN: CPT | Mod: PBBFAC | Performed by: STUDENT IN AN ORGANIZED HEALTH CARE EDUCATION/TRAINING PROGRAM

## 2024-02-09 PROCEDURE — 99214 OFFICE O/P EST MOD 30 MIN: CPT | Mod: S$PBB,,, | Performed by: STUDENT IN AN ORGANIZED HEALTH CARE EDUCATION/TRAINING PROGRAM

## 2024-02-09 NOTE — PROGRESS NOTES
RHEUMATOLOGY CLINIC ESTABLISHED PATIENT VISIT    Reason for consult:- undifferentiated connective tissue disease    Chief complaints, HPI, ROS, EXAM, Assessment & Plans:-    Juanita Walton is a 68 y.o. pleasant female who presents to follow-up from her previous visit with Chelsea in September for management undifferentiated connective tissue disease.  She is new to me.  She is currently on hydroxychloroquine 200 mg 3 times weekly.  Taking pilocarpine as needed for dry mouth.  Overall states she is doing well with no new symptoms in the interim.  Denies any significant joint pains or morning stiffness.  No falls or fractures since last visit.  Rheumatologic review of systems otherwise negative.  No synovitis, dactylitis or enthesitis.  No rashes noted.      Reviewed all available old and outside pertinent medical records.    All lab results personally reviewed and interpreted by me.    1. Undifferentiated connective tissue disease    2. Sicca syndrome        Problem List Items Addressed This Visit          Immunology/Multi System    Undifferentiated connective tissue disease - Primary    Relevant Orders    Comprehensive Metabolic Panel    CBC Auto Differential    C4 Complement    C3 Complement    Anti-DNA Ab, Double-Stranded    C-Reactive Protein    Protein/Creatinine Ratio, Urine    Sedimentation rate    Urinalysis    Sicca syndrome    Relevant Orders    Comprehensive Metabolic Panel    CBC Auto Differential    C4 Complement    C3 Complement    Anti-DNA Ab, Double-Stranded    C-Reactive Protein    Protein/Creatinine Ratio, Urine    Sedimentation rate    Urinalysis       Patient following up today for management of undifferentiated connective tissue disease  Doing well on hydroxychloroquine 200 mg 3 times weekly  Continue yearly eye exams  Pilocarpine 5 mg daily or b.i.d. as needed to help with dry eyes  Slight leukopenia on recent labs  we will recheck in 3 months    # Follow up in about 6 months (around  8/9/2024).    Chronic comorbid conditions affecting medical decision making today:    Past Medical History:   Diagnosis Date    Anemia     Essential hypertension     History of HPV infection     Hyperlipidemia     Overweight (BMI 25.0-29.9)     Sicca syndrome     Ulcerative colitis     Undifferentiated connective tissue disease        Past Surgical History:   Procedure Laterality Date    TUBAL LIGATION          Social History     Tobacco Use    Smoking status: Never    Smokeless tobacco: Never   Substance Use Topics    Alcohol use: Yes     Comment: rare    Drug use: Never       Family History   Problem Relation Age of Onset    Cataracts Mother     Hypertension Mother     Rheum arthritis Mother     Heart failure Mother     Hypertension Sister     Cataracts Sister     Hypertension Brother     No Known Problems Father     Multiple sclerosis Grandchild     Breast cancer Maternal Aunt        Review of patient's allergies indicates:  No Known Allergies    Medication List with Changes/Refills   Current Medications    ATORVASTATIN (LIPITOR) 10 MG TABLET    Take 1 tablet by mouth Daily.    CLOBETASOL (TEMOVATE) 0.05 % CREAM    Apply topically 2 (two) times daily. for 10 days    HYDROCORTISONE 2.5 % LOTION    Apply topically 2 (two) times daily.    HYDROXYCHLOROQUINE (PLAQUENIL) 200 MG TABLET    Take one tablet three days a week (M, W, F)    MULTIVITAMIN ORAL    Daily.    OLMESARTAN-AMLODIPIN-HCTHIAZID 40-10-12.5 MG TAB    TAKE 1 TABLET BY MOUTH EVERY DAY    PILOCARPINE (SALAGEN) 5 MG TAB    Take 1 tablet (5 mg total) by mouth 2 (two) times daily.    VEDOLIZUMAB (ENTYVIO IV)    Inject into the vein.         Disclaimer: This note was prepared using voice recognition system and is likely to have sound alike errors and is not proofread.  Please message me with any questions.    32 minutes of total time spent on the encounter, which includes face to face time and non-face to face time preparing to see the patient (eg, review of  tests), Obtaining and/or reviewing separately obtained history, Documenting clinical information in the electronic or other health record, Independently interpreting results (not separately reported) and communicating results to the patient/family/caregiver, or Care coordination (not separately reported).     Thank you for allowing me to participate in the care of Juanita Walton.    Boogie Goldsmith MD

## 2024-02-10 ENCOUNTER — PATIENT MESSAGE (OUTPATIENT)
Dept: ADMINISTRATIVE | Facility: OTHER | Age: 69
End: 2024-02-10
Payer: MEDICARE

## 2024-05-13 ENCOUNTER — LAB VISIT (OUTPATIENT)
Dept: LAB | Facility: HOSPITAL | Age: 69
End: 2024-05-13
Attending: STUDENT IN AN ORGANIZED HEALTH CARE EDUCATION/TRAINING PROGRAM
Payer: MEDICARE

## 2024-05-13 DIAGNOSIS — M35.00 SICCA SYNDROME: ICD-10-CM

## 2024-05-13 DIAGNOSIS — M35.9 UNDIFFERENTIATED CONNECTIVE TISSUE DISEASE: ICD-10-CM

## 2024-05-13 LAB
ALBUMIN SERPL BCP-MCNC: 3.8 G/DL (ref 3.5–5.2)
ALP SERPL-CCNC: 61 U/L (ref 55–135)
ALT SERPL W/O P-5'-P-CCNC: 15 U/L (ref 10–44)
ANION GAP SERPL CALC-SCNC: 11 MMOL/L (ref 8–16)
AST SERPL-CCNC: 22 U/L (ref 10–40)
BASOPHILS # BLD AUTO: 0.02 K/UL (ref 0–0.2)
BASOPHILS NFR BLD: 0.5 % (ref 0–1.9)
BILIRUB SERPL-MCNC: 0.3 MG/DL (ref 0.1–1)
BUN SERPL-MCNC: 11 MG/DL (ref 8–23)
CALCIUM SERPL-MCNC: 9.6 MG/DL (ref 8.7–10.5)
CHLORIDE SERPL-SCNC: 107 MMOL/L (ref 95–110)
CO2 SERPL-SCNC: 26 MMOL/L (ref 23–29)
CREAT SERPL-MCNC: 1 MG/DL (ref 0.5–1.4)
CRP SERPL-MCNC: 1.1 MG/L (ref 0–8.2)
DIFFERENTIAL METHOD BLD: NORMAL
EOSINOPHIL # BLD AUTO: 0.1 K/UL (ref 0–0.5)
EOSINOPHIL NFR BLD: 2.3 % (ref 0–8)
ERYTHROCYTE [DISTWIDTH] IN BLOOD BY AUTOMATED COUNT: 12.4 % (ref 11.5–14.5)
ERYTHROCYTE [SEDIMENTATION RATE] IN BLOOD BY WESTERGREN METHOD: 19 MM/HR (ref 0–20)
EST. GFR  (NO RACE VARIABLE): >60 ML/MIN/1.73 M^2
GLUCOSE SERPL-MCNC: 83 MG/DL (ref 70–110)
HCT VFR BLD AUTO: 40.5 % (ref 37–48.5)
HGB BLD-MCNC: 13.2 G/DL (ref 12–16)
IMM GRANULOCYTES # BLD AUTO: 0.01 K/UL (ref 0–0.04)
IMM GRANULOCYTES NFR BLD AUTO: 0.3 % (ref 0–0.5)
LYMPHOCYTES # BLD AUTO: 1.4 K/UL (ref 1–4.8)
LYMPHOCYTES NFR BLD: 34 % (ref 18–48)
MCH RBC QN AUTO: 30.8 PG (ref 27–31)
MCHC RBC AUTO-ENTMCNC: 32.6 G/DL (ref 32–36)
MCV RBC AUTO: 94 FL (ref 82–98)
MONOCYTES # BLD AUTO: 0.4 K/UL (ref 0.3–1)
MONOCYTES NFR BLD: 9.1 % (ref 4–15)
NEUTROPHILS # BLD AUTO: 2.1 K/UL (ref 1.8–7.7)
NEUTROPHILS NFR BLD: 53.8 % (ref 38–73)
NRBC BLD-RTO: 0 /100 WBC
PLATELET # BLD AUTO: 226 K/UL (ref 150–450)
PMV BLD AUTO: 10.3 FL (ref 9.2–12.9)
POTASSIUM SERPL-SCNC: 4 MMOL/L (ref 3.5–5.1)
PROT SERPL-MCNC: 7.5 G/DL (ref 6–8.4)
RBC # BLD AUTO: 4.29 M/UL (ref 4–5.4)
SODIUM SERPL-SCNC: 144 MMOL/L (ref 136–145)
WBC # BLD AUTO: 3.97 K/UL (ref 3.9–12.7)

## 2024-05-13 PROCEDURE — 85651 RBC SED RATE NONAUTOMATED: CPT | Performed by: STUDENT IN AN ORGANIZED HEALTH CARE EDUCATION/TRAINING PROGRAM

## 2024-05-13 PROCEDURE — 36415 COLL VENOUS BLD VENIPUNCTURE: CPT | Performed by: STUDENT IN AN ORGANIZED HEALTH CARE EDUCATION/TRAINING PROGRAM

## 2024-05-13 PROCEDURE — 85025 COMPLETE CBC W/AUTO DIFF WBC: CPT | Performed by: STUDENT IN AN ORGANIZED HEALTH CARE EDUCATION/TRAINING PROGRAM

## 2024-05-13 PROCEDURE — 86140 C-REACTIVE PROTEIN: CPT | Performed by: STUDENT IN AN ORGANIZED HEALTH CARE EDUCATION/TRAINING PROGRAM

## 2024-05-13 PROCEDURE — 80053 COMPREHEN METABOLIC PANEL: CPT | Performed by: STUDENT IN AN ORGANIZED HEALTH CARE EDUCATION/TRAINING PROGRAM

## 2024-07-01 NOTE — PROGRESS NOTES
Subjective     Patient ID: Juanita Walton is a 69 y.o. female.    Chief Complaint:Hypertension medication      HPI  Patient presents via video call due to blood pressure medication being on backorder. She has called multiple pharmacies and it is on backorder at all of them. She has only a few pills left  Review of Systems   Constitutional:  Negative for activity change and unexpected weight change.   HENT:  Negative for hearing loss, rhinorrhea and trouble swallowing.    Eyes:  Negative for discharge and visual disturbance.   Respiratory:  Negative for chest tightness and wheezing.    Cardiovascular:  Negative for chest pain and palpitations.   Gastrointestinal:  Negative for blood in stool, constipation, diarrhea and vomiting.   Endocrine: Negative for polydipsia and polyuria.   Genitourinary:  Negative for difficulty urinating, dysuria, hematuria and menstrual problem.   Musculoskeletal:  Negative for arthralgias, joint swelling and neck pain.   Neurological:  Negative for weakness and headaches.   Psychiatric/Behavioral:  Negative for confusion and dysphoric mood.           Objective       Current Outpatient Medications:     amLODIPine (NORVASC) 10 MG tablet, Take 1 tablet (10 mg total) by mouth once daily., Disp: 90 tablet, Rfl: 3    atorvastatin (LIPITOR) 10 MG tablet, Take 1 tablet by mouth Daily., Disp: , Rfl:     clobetasoL (TEMOVATE) 0.05 % cream, Apply topically 2 (two) times daily. for 10 days, Disp: 45 g, Rfl: 2    hydroCHLOROthiazide (MICROZIDE) 12.5 mg capsule, Take 1 capsule (12.5 mg total) by mouth once daily., Disp: 90 capsule, Rfl: 3    hydrocortisone 2.5 % lotion, Apply topically 2 (two) times daily., Disp: 1 Bottle, Rfl: 5    hydrOXYchloroQUINE (PLAQUENIL) 200 mg tablet, Take one tablet three days a week (M, W, F), Disp: 90 tablet, Rfl: 0    MULTIVITAMIN ORAL, Daily., Disp: , Rfl:     olmesartan (BENICAR) 40 MG tablet, Take 1 tablet (40 mg total) by mouth once daily., Disp: 90 tablet, Rfl: 3     olmesartan-amLODIPin-hcthiazid 40-10-12.5 mg Tab, TAKE 1 TABLET BY MOUTH EVERY DAY, Disp: 90 tablet, Rfl: 0    pilocarpine (SALAGEN) 5 MG Tab, Take 1 tablet (5 mg total) by mouth 2 (two) times daily., Disp: 180 tablet, Rfl: 2    vedolizumab (ENTYVIO IV), Inject into the vein., Disp: , Rfl:      Physical Exam  Constitutional:       Appearance: Normal appearance.   Neurological:      General: No focal deficit present.      Mental Status: She is alert and oriented to person, place, and time.   Psychiatric:         Mood and Affect: Mood normal.         Behavior: Behavior normal.            Assessment and Plan     1. Essential hypertension  -     olmesartan (BENICAR) 40 MG tablet; Take 1 tablet (40 mg total) by mouth once daily.  Dispense: 90 tablet; Refill: 3  -     amLODIPine (NORVASC) 10 MG tablet; Take 1 tablet (10 mg total) by mouth once daily.  Dispense: 90 tablet; Refill: 3  -     hydroCHLOROthiazide (MICROZIDE) 12.5 mg capsule; Take 1 capsule (12.5 mg total) by mouth once daily.  Dispense: 90 capsule; Refill: 3    Will refill medications individually         The patient location is: home  The chief complaint leading to consultation is: hypertension medication    Visit type: audiovisual    Face to Face time with patient: 10  23 minutes of total time spent on the encounter, which includes face to face time and non-face to face time preparing to see the patient (eg, review of tests), Obtaining and/or reviewing separately obtained history, Documenting clinical information in the electronic or other health record, Independently interpreting results (not separately reported) and communicating results to the patient/family/caregiver, or Care coordination (not separately reported).         Each patient to whom he or she provides medical services by telemedicine is:  (1) informed of the relationship between the physician and patient and the respective role of any other health care provider with respect to management of  the patient; and (2) notified that he or she may decline to receive medical services by telemedicine and may withdraw from such care at any time.    Notes:   No follow-ups on file.

## 2024-07-02 ENCOUNTER — OFFICE VISIT (OUTPATIENT)
Dept: FAMILY MEDICINE | Facility: CLINIC | Age: 69
End: 2024-07-02
Payer: MEDICARE

## 2024-07-02 ENCOUNTER — PATIENT MESSAGE (OUTPATIENT)
Dept: FAMILY MEDICINE | Facility: CLINIC | Age: 69
End: 2024-07-02

## 2024-07-02 DIAGNOSIS — I10 ESSENTIAL HYPERTENSION: ICD-10-CM

## 2024-07-02 DIAGNOSIS — I10 ESSENTIAL HYPERTENSION: Primary | ICD-10-CM

## 2024-07-02 PROCEDURE — 99213 OFFICE O/P EST LOW 20 MIN: CPT | Mod: 95,,, | Performed by: INTERNAL MEDICINE

## 2024-07-02 RX ORDER — OLMESARTAN MEDOXOMIL 40 MG/1
40 TABLET ORAL DAILY
Qty: 90 TABLET | Refills: 3 | Status: SHIPPED | OUTPATIENT
Start: 2024-07-02 | End: 2024-07-03 | Stop reason: SDUPTHER

## 2024-07-02 RX ORDER — HYDROCHLOROTHIAZIDE 12.5 MG/1
12.5 CAPSULE ORAL DAILY
Qty: 90 CAPSULE | Refills: 3 | Status: SHIPPED | OUTPATIENT
Start: 2024-07-02 | End: 2024-07-03 | Stop reason: SDUPTHER

## 2024-07-02 RX ORDER — AMLODIPINE BESYLATE 10 MG/1
10 TABLET ORAL DAILY
Qty: 90 TABLET | Refills: 3 | Status: SHIPPED | OUTPATIENT
Start: 2024-07-02 | End: 2024-07-03 | Stop reason: SDUPTHER

## 2024-07-03 RX ORDER — OLMESARTAN MEDOXOMIL 40 MG/1
40 TABLET ORAL DAILY
Qty: 90 TABLET | Refills: 3 | Status: SHIPPED | OUTPATIENT
Start: 2024-07-03 | End: 2025-07-03

## 2024-07-03 RX ORDER — HYDROCHLOROTHIAZIDE 12.5 MG/1
12.5 CAPSULE ORAL DAILY
Qty: 90 CAPSULE | Refills: 3 | Status: SHIPPED | OUTPATIENT
Start: 2024-07-03 | End: 2025-07-03

## 2024-07-03 RX ORDER — AMLODIPINE BESYLATE 10 MG/1
10 TABLET ORAL DAILY
Qty: 90 TABLET | Refills: 3 | Status: SHIPPED | OUTPATIENT
Start: 2024-07-03 | End: 2025-07-03

## 2024-07-05 DIAGNOSIS — M35.9 UNDIFFERENTIATED CONNECTIVE TISSUE DISEASE: ICD-10-CM

## 2024-07-05 DIAGNOSIS — M35.00 SICCA SYNDROME: ICD-10-CM

## 2024-07-05 RX ORDER — PILOCARPINE HYDROCHLORIDE 5 MG/1
5 TABLET, FILM COATED ORAL 2 TIMES DAILY
Qty: 180 TABLET | Refills: 2 | Status: SHIPPED | OUTPATIENT
Start: 2024-07-05

## 2024-07-18 ENCOUNTER — PATIENT MESSAGE (OUTPATIENT)
Dept: ADMINISTRATIVE | Facility: CLINIC | Age: 69
End: 2024-07-18
Payer: MEDICARE

## 2024-08-05 ENCOUNTER — LAB VISIT (OUTPATIENT)
Dept: LAB | Facility: HOSPITAL | Age: 69
End: 2024-08-05
Attending: STUDENT IN AN ORGANIZED HEALTH CARE EDUCATION/TRAINING PROGRAM
Payer: MEDICARE

## 2024-08-05 DIAGNOSIS — M35.9 UNDIFFERENTIATED CONNECTIVE TISSUE DISEASE: ICD-10-CM

## 2024-08-05 DIAGNOSIS — M35.00 SICCA SYNDROME: ICD-10-CM

## 2024-08-05 LAB
ALBUMIN SERPL BCP-MCNC: 3.7 G/DL (ref 3.5–5.2)
ALP SERPL-CCNC: 62 U/L (ref 55–135)
ALT SERPL W/O P-5'-P-CCNC: 17 U/L (ref 10–44)
ANION GAP SERPL CALC-SCNC: 7 MMOL/L (ref 8–16)
AST SERPL-CCNC: 23 U/L (ref 10–40)
BASOPHILS # BLD AUTO: 0.04 K/UL (ref 0–0.2)
BASOPHILS NFR BLD: 0.9 % (ref 0–1.9)
BILIRUB SERPL-MCNC: 0.3 MG/DL (ref 0.1–1)
BUN SERPL-MCNC: 13 MG/DL (ref 8–23)
CALCIUM SERPL-MCNC: 9.3 MG/DL (ref 8.7–10.5)
CHLORIDE SERPL-SCNC: 107 MMOL/L (ref 95–110)
CO2 SERPL-SCNC: 28 MMOL/L (ref 23–29)
CREAT SERPL-MCNC: 1 MG/DL (ref 0.5–1.4)
CRP SERPL-MCNC: 0.8 MG/L (ref 0–8.2)
DIFFERENTIAL METHOD BLD: ABNORMAL
EOSINOPHIL # BLD AUTO: 0.2 K/UL (ref 0–0.5)
EOSINOPHIL NFR BLD: 3.3 % (ref 0–8)
ERYTHROCYTE [DISTWIDTH] IN BLOOD BY AUTOMATED COUNT: 12.4 % (ref 11.5–14.5)
ERYTHROCYTE [SEDIMENTATION RATE] IN BLOOD BY WESTERGREN METHOD: 30 MM/HR (ref 0–20)
EST. GFR  (NO RACE VARIABLE): >60 ML/MIN/1.73 M^2
GLUCOSE SERPL-MCNC: 87 MG/DL (ref 70–110)
HCT VFR BLD AUTO: 40.6 % (ref 37–48.5)
HGB BLD-MCNC: 13.3 G/DL (ref 12–16)
IMM GRANULOCYTES # BLD AUTO: 0.01 K/UL (ref 0–0.04)
IMM GRANULOCYTES NFR BLD AUTO: 0.2 % (ref 0–0.5)
LYMPHOCYTES # BLD AUTO: 1.9 K/UL (ref 1–4.8)
LYMPHOCYTES NFR BLD: 41.2 % (ref 18–48)
MCH RBC QN AUTO: 31.3 PG (ref 27–31)
MCHC RBC AUTO-ENTMCNC: 32.8 G/DL (ref 32–36)
MCV RBC AUTO: 96 FL (ref 82–98)
MONOCYTES # BLD AUTO: 0.5 K/UL (ref 0.3–1)
MONOCYTES NFR BLD: 10.5 % (ref 4–15)
NEUTROPHILS # BLD AUTO: 2 K/UL (ref 1.8–7.7)
NEUTROPHILS NFR BLD: 43.9 % (ref 38–73)
NRBC BLD-RTO: 0 /100 WBC
PLATELET # BLD AUTO: 223 K/UL (ref 150–450)
PMV BLD AUTO: 11.1 FL (ref 9.2–12.9)
POTASSIUM SERPL-SCNC: 3.9 MMOL/L (ref 3.5–5.1)
PROT SERPL-MCNC: 7.5 G/DL (ref 6–8.4)
RBC # BLD AUTO: 4.25 M/UL (ref 4–5.4)
SODIUM SERPL-SCNC: 142 MMOL/L (ref 136–145)
WBC # BLD AUTO: 4.49 K/UL (ref 3.9–12.7)

## 2024-08-05 PROCEDURE — 86140 C-REACTIVE PROTEIN: CPT | Performed by: STUDENT IN AN ORGANIZED HEALTH CARE EDUCATION/TRAINING PROGRAM

## 2024-08-05 PROCEDURE — 85025 COMPLETE CBC W/AUTO DIFF WBC: CPT | Performed by: STUDENT IN AN ORGANIZED HEALTH CARE EDUCATION/TRAINING PROGRAM

## 2024-08-05 PROCEDURE — 85651 RBC SED RATE NONAUTOMATED: CPT | Performed by: STUDENT IN AN ORGANIZED HEALTH CARE EDUCATION/TRAINING PROGRAM

## 2024-08-05 PROCEDURE — 36415 COLL VENOUS BLD VENIPUNCTURE: CPT | Performed by: STUDENT IN AN ORGANIZED HEALTH CARE EDUCATION/TRAINING PROGRAM

## 2024-08-05 PROCEDURE — 80053 COMPREHEN METABOLIC PANEL: CPT | Performed by: STUDENT IN AN ORGANIZED HEALTH CARE EDUCATION/TRAINING PROGRAM

## 2024-08-12 ENCOUNTER — OFFICE VISIT (OUTPATIENT)
Dept: RHEUMATOLOGY | Facility: CLINIC | Age: 69
End: 2024-08-12
Payer: MEDICARE

## 2024-08-12 VITALS
WEIGHT: 160 LBS | HEIGHT: 62 IN | SYSTOLIC BLOOD PRESSURE: 128 MMHG | BODY MASS INDEX: 29.44 KG/M2 | DIASTOLIC BLOOD PRESSURE: 74 MMHG | HEART RATE: 62 BPM

## 2024-08-12 DIAGNOSIS — M35.00 SICCA SYNDROME: ICD-10-CM

## 2024-08-12 DIAGNOSIS — M35.9 UNDIFFERENTIATED CONNECTIVE TISSUE DISEASE: Primary | ICD-10-CM

## 2024-08-12 PROCEDURE — 99213 OFFICE O/P EST LOW 20 MIN: CPT | Mod: PBBFAC | Performed by: STUDENT IN AN ORGANIZED HEALTH CARE EDUCATION/TRAINING PROGRAM

## 2024-08-12 PROCEDURE — 99999 PR PBB SHADOW E&M-EST. PATIENT-LVL III: CPT | Mod: PBBFAC,,, | Performed by: STUDENT IN AN ORGANIZED HEALTH CARE EDUCATION/TRAINING PROGRAM

## 2024-08-12 PROCEDURE — 99214 OFFICE O/P EST MOD 30 MIN: CPT | Mod: S$PBB,,, | Performed by: STUDENT IN AN ORGANIZED HEALTH CARE EDUCATION/TRAINING PROGRAM

## 2024-08-12 NOTE — PROGRESS NOTES
RHEUMATOLOGY CLINIC ESTABLISHED PATIENT VISIT    Reason for consult:- undifferentiated connective tissue disease    Chief complaints, HPI, ROS, EXAM, Assessment & Plans:-    Juanita Walton is a 69 y.o. pleasant female who presents to follow-up from her previous visit for management of undifferentiated connective tissue disease.  Currently on hydroxychloroquine 200 mg 3 times weekly.  Taking pilocarpine as needed for dry mouth.  Overall doing very well no new symptoms.  Denies any significant joint pains or morning stiffness.  Rheumatologic review of systems otherwise negative.  No synovitis, dactylitis or enthesitis.  No rashes noted.    Reviewed all available old and outside pertinent medical records.    All lab results personally reviewed and interpreted by me.    1. Undifferentiated connective tissue disease    2. Sicca syndrome        Problem List Items Addressed This Visit          Immunology/Multi System    Undifferentiated connective tissue disease - Primary    Sicca syndrome       Patient following up today for management of undifferentiated connective tissue disease  Doing well on hydroxychloroquine 200 mg 3 times weekly  Continue yearly eye exam  Pilocarpine 5 mg daily or b.i.d. as needed to help with dry eyes      # Follow up in about 6 months (around 2/12/2025).    Chronic comorbid conditions affecting medical decision making today:    Past Medical History:   Diagnosis Date    Anemia     Essential hypertension     History of HPV infection     Hyperlipidemia     Overweight (BMI 25.0-29.9)     Sicca syndrome     Ulcerative colitis     Undifferentiated connective tissue disease        Past Surgical History:   Procedure Laterality Date    TUBAL LIGATION          Social History     Tobacco Use    Smoking status: Never    Smokeless tobacco: Never   Substance Use Topics    Alcohol use: Yes     Comment: rare    Drug use: Never       Family History   Problem Relation Name Age of Onset    Cataracts Mother       Hypertension Mother      Rheum arthritis Mother      Heart failure Mother      Hypertension Sister      Cataracts Sister      Hypertension Brother      No Known Problems Father      Multiple sclerosis Grandchild      Breast cancer Maternal Aunt         Review of patient's allergies indicates:  No Known Allergies    Medication List with Changes/Refills   Current Medications    AMLODIPINE (NORVASC) 10 MG TABLET    Take 1 tablet (10 mg total) by mouth once daily.    ATORVASTATIN (LIPITOR) 10 MG TABLET    Take 1 tablet by mouth Daily.    CLOBETASOL (TEMOVATE) 0.05 % CREAM    Apply topically 2 (two) times daily. for 10 days    HYDROCHLOROTHIAZIDE (MICROZIDE) 12.5 MG CAPSULE    Take 1 capsule (12.5 mg total) by mouth once daily.    HYDROCORTISONE 2.5 % LOTION    Apply topically 2 (two) times daily.    HYDROXYCHLOROQUINE (PLAQUENIL) 200 MG TABLET    Take one tablet three days a week (M, W, F)    MULTIVITAMIN ORAL    Daily.    OLMESARTAN (BENICAR) 40 MG TABLET    Take 1 tablet (40 mg total) by mouth once daily.    OLMESARTAN-AMLODIPIN-HCTHIAZID 40-10-12.5 MG TAB    TAKE 1 TABLET BY MOUTH EVERY DAY    PILOCARPINE (SALAGEN) 5 MG TAB    Take 1 tablet (5 mg total) by mouth 2 (two) times daily.    VEDOLIZUMAB (ENTYVIO IV)    Inject into the vein.         Disclaimer: This note was prepared using voice recognition system and is likely to have sound alike errors and is not proofread.  Please message me with any questions.    32 minutes of total time spent on the encounter, which includes face to face time and non-face to face time preparing to see the patient (eg, review of tests), Obtaining and/or reviewing separately obtained history, Documenting clinical information in the electronic or other health record, Independently interpreting results (not separately reported) and communicating results to the patient/family/caregiver, or Care coordination (not separately reported).     Thank you for allowing me to participate in the care  of Juanita Walton.    Boogie Goldsmith MD

## 2024-09-25 DIAGNOSIS — Z00.00 ENCOUNTER FOR MEDICARE ANNUAL WELLNESS EXAM: ICD-10-CM

## 2024-10-23 ENCOUNTER — PATIENT MESSAGE (OUTPATIENT)
Dept: OTHER | Facility: OTHER | Age: 69
End: 2024-10-23
Payer: MEDICARE

## 2024-11-08 ENCOUNTER — OFFICE VISIT (OUTPATIENT)
Dept: FAMILY MEDICINE | Facility: CLINIC | Age: 69
End: 2024-11-08
Payer: MEDICARE

## 2024-11-08 VITALS
DIASTOLIC BLOOD PRESSURE: 72 MMHG | HEART RATE: 89 BPM | SYSTOLIC BLOOD PRESSURE: 139 MMHG | OXYGEN SATURATION: 98 % | BODY MASS INDEX: 32.48 KG/M2 | TEMPERATURE: 97 F | HEIGHT: 62 IN | WEIGHT: 176.5 LBS

## 2024-11-08 DIAGNOSIS — Z91.89 GYN EXAM FOR HIGH-RISK MEDICARE PATIENT: ICD-10-CM

## 2024-11-08 DIAGNOSIS — E78.5 HYPERLIPIDEMIA, UNSPECIFIED HYPERLIPIDEMIA TYPE: Chronic | ICD-10-CM

## 2024-11-08 DIAGNOSIS — Z12.4 CERVICAL CANCER SCREENING: ICD-10-CM

## 2024-11-08 DIAGNOSIS — K51.30 ULCERATIVE RECTOSIGMOIDITIS WITHOUT COMPLICATION: Chronic | ICD-10-CM

## 2024-11-08 DIAGNOSIS — Z23 NEED FOR VACCINATION: ICD-10-CM

## 2024-11-08 DIAGNOSIS — I10 ESSENTIAL HYPERTENSION: Primary | Chronic | ICD-10-CM

## 2024-11-08 DIAGNOSIS — R87.618 ABNORMAL PAPANICOLAOU SMEAR OF CERVIX WITH POSITIVE HUMAN PAPILLOMA VIRUS (HPV) TEST: ICD-10-CM

## 2024-11-08 PROBLEM — R53.1 DECREASED STRENGTH: Status: RESOLVED | Noted: 2021-09-17 | Resolved: 2024-11-08

## 2024-11-08 PROCEDURE — 99999 PR PBB SHADOW E&M-EST. PATIENT-LVL III: CPT | Mod: PBBFAC,,, | Performed by: FAMILY MEDICINE

## 2024-11-08 PROCEDURE — 99213 OFFICE O/P EST LOW 20 MIN: CPT | Mod: PBBFAC,PO | Performed by: FAMILY MEDICINE

## 2024-11-08 NOTE — PROGRESS NOTES
CHIEF COMPLAINT:  This is a 69-year-old female here for preventive health exam.     SUBJECTIVE: Patient is doing well without complaints. Patient has hypertension for which she takes a combination of amlodipine 10 mg, olmesartan 40 mg and HCTZ 12.5 mg daily.  Blood pressure is 142/74.  She takes atorvastatin 10 mg daily for hyperlipidemia.  Patient is followed by a cardiologist.  Patient has sicca syndrome and undifferentiated connective tissue disease for which she takes hydroxychloroquine and pilocarpine.  She has ulcerative colitis and is followed by GI.  She takes Entyvio every 8 weeks.  Patient had HPV type 16 and other high risk types on Pap in June 2019.  Colposcopy in June 2019 was negative.      Eye exam January 2024.   Pap smear September 2021 due again in 2024.  Mammogram November 2023.  Bone DEXA scan September 2023. Colonoscopy June 2021.  Immunizations: Influenza vaccine October 2023.  Prevnar January 2017.  Tdap December 2017.  COVID 19 vaccine March, April, December 2021, August 2022, September 2024.     ROS:  GENERAL: Patient denies fever, chills, night sweats.  Patient denies weight gain or loss. Patient denies anorexia, fatigue, weakness or swollen glands.  SKIN: Patient denies rash or hair loss.  HEENT: Patient denies sore throat, ear pain, hearing loss, nasal congestion, or runny nose. Patient denies visual disturbance, eye irritation or discharge.  LUNGS: Patient denies cough, wheeze or hemoptysis.  CARDIOVASCULAR: Patient denies chest pain, shortness of breath, palpitations, syncope or lower extremity edema.  GI: Patient denies abdominal pain, nausea, vomiting, diarrhea, constipation, blood in stool or melena.  GENITOURINARY: Patient denies pelvic pain, vaginal discharge, itch or odor. Patient denies irregular vaginal bleeding.  Patient denies dysuria, frequency, hematuria, nocturia, urgency or incontinence.  BREASTS: Patient denies breast pain, mass or nipple discharge.  MUSCULOSKELETAL:  Patient denies joint pain, swelling, redness or warmth.  NEUROLOGIC: Patient denies headache, vertigo, paresthesias, weakness in limb, dysarthria, dysphagia or abnormality of gait.  PSYCHIATRIC: Patient denies anxiety, depression, or memory loss.     OBJECTIVE:   GENERAL: Well-developed well-nourished slightly overweight black female alert and oriented x3, in no acute distress.  Memory, judgment and cognition without deficit.  Weight gain of 8 pounds in the last year.  SKIN:  Fine maculopapular rash on chin and lower face.  Normal color and tone.  HEENT: Eyes: Clear conjunctivae. No scleral icterus.  Pupils equal reactive to light and accommodation.  Ears: Clear canals. Clear TMs.  Nose: Without congestion.  Pharynx: Without injection or exudates.  NECK: Supple, normal range of motion.  No masses, lymphadenopathy or enlarged thyroid.  No JVD.  Carotids 2+ and equal.  No bruits.  LUNGS: Clear to auscultation.  Normal respiratory effort.  CARDIOVASCULAR:  Regular rhythm, normal S1, S2 without murmur, gallop or rub.  BACK:  No CVA or spinal tenderness.  BREASTS:  No masses, tenderness or nipple discharge.  ABDOMEN: Normal appearance.  Active bowel sounds.  Soft, nontender without mass or organomegaly.  No rebound or guarding.  EXTREMITIES: Without cyanosis, clubbing or edema.  Distal pulses 2+ and equal.  Normal range of motion in all extremities.  No joint effusion, erythema or warmth.  Bilateral bunions.  NEUROLOGIC:   Cranial nerves II through XII without deficit.  Motor strength equal bilaterally.  Sensation normal to touch.  Deep tendon reflexes 2+ and equal.  Gait without abnormality.  No tremor.  Negative cerebellar signs.  PELVIC:  External: Without lesions or inflammation.  Urethral meatus normal size and shape Vaginal: Clear discharge, atrophic changes.  Cervix: Normal appearance.  Friable.  No motion tenderness.  Pap x2.  Uterus: Normal size and shape.  Adnexa: Non-tender, without masses.  Rectovaginal:  Confirms.  Normal anal sphincter tone.  Heme-negative stool x3.    ASSESSMENT:  1. Essential hypertension    2. Hyperlipidemia, unspecified hyperlipidemia type    3. 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 Cervical cancer screening    5. Abnormal Papanicolaou smear of cervix with positive human papilloma virus (HPV) test    6. GYN exam for high-risk Medicare patient      PLAN:  1. Weight reduction. Exercise regularly.  2. Age-appropriate counseling.  3. Fasting lab.  4. Mammogram.  5. Refill medications as needed.  6. Influenza vaccine.    7. Follow-up in 6-12 months.    30 minutes of total time spent on the encounter, which includes face to face time and non-face to face time preparing to see the patient (eg, review of tests), Obtaining and/or reviewing separately obtained history, Documenting clinical information in the electronic or other health record, Independently interpreting results (not separately reported) and communicating results to the patient/family/caregiver, or Care coordination (not separately reported).     This note is generated with speech recognition software and is subject to transcription error and sound alike phrases that may be missed by proofreading.

## 2024-11-19 ENCOUNTER — PATIENT MESSAGE (OUTPATIENT)
Dept: FAMILY MEDICINE | Facility: CLINIC | Age: 69
End: 2024-11-19
Payer: MEDICARE

## 2024-11-19 DIAGNOSIS — Z12.31 ENCOUNTER FOR SCREENING MAMMOGRAM FOR MALIGNANT NEOPLASM OF BREAST: Primary | ICD-10-CM

## 2024-12-12 ENCOUNTER — TELEPHONE (OUTPATIENT)
Dept: FAMILY MEDICINE | Facility: CLINIC | Age: 69
End: 2024-12-12
Payer: MEDICARE

## 2024-12-13 ENCOUNTER — HOSPITAL ENCOUNTER (OUTPATIENT)
Dept: RADIOLOGY | Facility: HOSPITAL | Age: 69
Discharge: HOME | End: 2024-12-13
Attending: FAMILY MEDICINE
Payer: MEDICARE

## 2024-12-13 DIAGNOSIS — Z12.31 ENCOUNTER FOR SCREENING MAMMOGRAM FOR MALIGNANT NEOPLASM OF BREAST: ICD-10-CM

## 2024-12-13 PROCEDURE — 77067 SCR MAMMO BI INCL CAD: CPT | Mod: TC,GA

## 2024-12-13 PROCEDURE — 77067 SCR MAMMO BI INCL CAD: CPT | Mod: 26,GA,, | Performed by: STUDENT IN AN ORGANIZED HEALTH CARE EDUCATION/TRAINING PROGRAM

## 2024-12-13 PROCEDURE — 77063 BREAST TOMOSYNTHESIS BI: CPT | Mod: 26,GA,, | Performed by: STUDENT IN AN ORGANIZED HEALTH CARE EDUCATION/TRAINING PROGRAM

## 2024-12-17 ENCOUNTER — TELEPHONE (OUTPATIENT)
Dept: RADIOLOGY | Facility: HOSPITAL | Age: 69
End: 2024-12-17
Payer: MEDICARE

## 2024-12-23 ENCOUNTER — HOSPITAL ENCOUNTER (OUTPATIENT)
Dept: RADIOLOGY | Facility: HOSPITAL | Age: 69
Discharge: HOME | End: 2024-12-23
Attending: FAMILY MEDICINE
Payer: MEDICARE

## 2024-12-23 DIAGNOSIS — R92.8 ABNORMAL MAMMOGRAM: ICD-10-CM

## 2024-12-23 PROCEDURE — 77065 DX MAMMO INCL CAD UNI: CPT | Mod: 26,RT,, | Performed by: RADIOLOGY

## 2024-12-23 PROCEDURE — 77061 BREAST TOMOSYNTHESIS UNI: CPT | Mod: 26,RT,, | Performed by: RADIOLOGY

## 2024-12-23 PROCEDURE — 77061 BREAST TOMOSYNTHESIS UNI: CPT | Mod: TC,RT

## 2025-01-31 ENCOUNTER — PATIENT MESSAGE (OUTPATIENT)
Dept: ADMINISTRATIVE | Facility: OTHER | Age: 70
End: 2025-01-31
Payer: MEDICARE

## 2025-01-31 ENCOUNTER — OFFICE VISIT (OUTPATIENT)
Dept: OPHTHALMOLOGY | Facility: CLINIC | Age: 70
End: 2025-01-31
Payer: MEDICARE

## 2025-01-31 DIAGNOSIS — I10 ESSENTIAL HYPERTENSION: ICD-10-CM

## 2025-01-31 DIAGNOSIS — H52.7 REFRACTIVE ERRORS: ICD-10-CM

## 2025-01-31 DIAGNOSIS — M35.9 UNDIFFERENTIATED CONNECTIVE TISSUE DISEASE: Primary | ICD-10-CM

## 2025-01-31 DIAGNOSIS — H25.13 CATARACT, NUCLEAR SCLEROTIC SENILE, BILATERAL: ICD-10-CM

## 2025-01-31 DIAGNOSIS — Z79.899 LONG-TERM USE OF PLAQUENIL: ICD-10-CM

## 2025-01-31 PROCEDURE — 99213 OFFICE O/P EST LOW 20 MIN: CPT | Mod: PBBFAC | Performed by: OPTOMETRIST

## 2025-01-31 PROCEDURE — 92083 EXTENDED VISUAL FIELD XM: CPT | Mod: PBBFAC | Performed by: OPTOMETRIST

## 2025-01-31 PROCEDURE — 92015 DETERMINE REFRACTIVE STATE: CPT | Mod: ,,, | Performed by: OPTOMETRIST

## 2025-01-31 PROCEDURE — 99999 PR PBB SHADOW E&M-EST. PATIENT-LVL III: CPT | Mod: PBBFAC,,, | Performed by: OPTOMETRIST

## 2025-01-31 PROCEDURE — 92014 COMPRE OPH EXAM EST PT 1/>: CPT | Mod: S$PBB,,, | Performed by: OPTOMETRIST

## 2025-01-31 PROCEDURE — 92134 CPTRZ OPH DX IMG PST SGM RTA: CPT | Mod: PBBFAC | Performed by: OPTOMETRIST

## 2025-01-31 NOTE — PROGRESS NOTES
SUBJECTIVE  Juanitalesa Walton is 69 y.o. female  Corrected distance visual acuity was 20/25 in the right eye and 20/30 in the left eye. Corrected near visual acuity was J1 in the right eye and J1 in the left eye.   Chief Complaint   Patient presents with    Annual Exam     Pt reports for routine eye exam.  Pt states her VA has been a little more blurry with near and far vision. Pt is wearing PAL full time and using a OTC Ivizia PRN OU.  Pt denies any eye pain.  Pt denies any new ocular disturbances.          HPI     Annual Exam     Additional comments: Pt reports for routine eye exam.  Pt states her VA has been a little more blurry with near and far vision.   Pt is wearing PAL full time and using a OTC Ivizia PRN OU.  Pt denies any eye pain.  Pt denies any new ocular disturbances.           Comments    1. Plaquenil 3 x week 200mg  2. Pvd ou             Last edited by Elisha Carty on 1/31/2025 10:25 AM.         Assessment /Plan :  1. Undifferentiated connective tissue disease  - Posterior Segment OCT Retina-Both eyes  - Pang Visual Field - OU - Extended - Both Eyes  Unchanged normal OU    2. Long-term use of Plaquenil  - Posterior Segment OCT Retina-Both eyes  - Pang Visual Field - OU - Extended - Both Eyes    3. Essential hypertension  No HTN Retinopathy, monitor annually.     4. Cataract, nuclear sclerotic senile, bilateral  Cataracts are not visually significant and not affecting activities of daily living.   Annual observation is recommended at this time.   Patient to call or return to clinic with any significant change in vision prior to next visit.    5. Refractive errors  Dispense Final Rx for glasses.  RTC 1 year  Discussed above and answered questions.

## 2025-02-17 ENCOUNTER — LAB VISIT (OUTPATIENT)
Dept: LAB | Facility: HOSPITAL | Age: 70
End: 2025-02-17
Attending: STUDENT IN AN ORGANIZED HEALTH CARE EDUCATION/TRAINING PROGRAM
Payer: MEDICARE

## 2025-02-17 DIAGNOSIS — M35.00 SICCA SYNDROME: ICD-10-CM

## 2025-02-17 DIAGNOSIS — M35.9 UNDIFFERENTIATED CONNECTIVE TISSUE DISEASE: ICD-10-CM

## 2025-02-17 LAB
BASOPHILS # BLD AUTO: 0.03 K/UL (ref 0–0.2)
BASOPHILS NFR BLD: 0.8 % (ref 0–1.9)
DIFFERENTIAL METHOD BLD: ABNORMAL
EOSINOPHIL # BLD AUTO: 0.1 K/UL (ref 0–0.5)
EOSINOPHIL NFR BLD: 3.9 % (ref 0–8)
ERYTHROCYTE [DISTWIDTH] IN BLOOD BY AUTOMATED COUNT: 12.4 % (ref 11.5–14.5)
ERYTHROCYTE [SEDIMENTATION RATE] IN BLOOD BY WESTERGREN METHOD: 31 MM/HR (ref 0–20)
HCT VFR BLD AUTO: 39.7 % (ref 37–48.5)
HGB BLD-MCNC: 13.1 G/DL (ref 12–16)
IMM GRANULOCYTES # BLD AUTO: 0.01 K/UL (ref 0–0.04)
IMM GRANULOCYTES NFR BLD AUTO: 0.3 % (ref 0–0.5)
LYMPHOCYTES # BLD AUTO: 1.2 K/UL (ref 1–4.8)
LYMPHOCYTES NFR BLD: 33.6 % (ref 18–48)
MCH RBC QN AUTO: 31.3 PG (ref 27–31)
MCHC RBC AUTO-ENTMCNC: 33 G/DL (ref 32–36)
MCV RBC AUTO: 95 FL (ref 82–98)
MONOCYTES # BLD AUTO: 0.3 K/UL (ref 0.3–1)
MONOCYTES NFR BLD: 9.5 % (ref 4–15)
NEUTROPHILS # BLD AUTO: 1.9 K/UL (ref 1.8–7.7)
NEUTROPHILS NFR BLD: 51.9 % (ref 38–73)
NRBC BLD-RTO: 0 /100 WBC
PLATELET # BLD AUTO: 201 K/UL (ref 150–450)
PMV BLD AUTO: 10.2 FL (ref 9.2–12.9)
RBC # BLD AUTO: 4.18 M/UL (ref 4–5.4)
WBC # BLD AUTO: 3.57 K/UL (ref 3.9–12.7)

## 2025-02-17 PROCEDURE — 85025 COMPLETE CBC W/AUTO DIFF WBC: CPT | Performed by: STUDENT IN AN ORGANIZED HEALTH CARE EDUCATION/TRAINING PROGRAM

## 2025-02-17 PROCEDURE — 86140 C-REACTIVE PROTEIN: CPT | Performed by: STUDENT IN AN ORGANIZED HEALTH CARE EDUCATION/TRAINING PROGRAM

## 2025-02-17 PROCEDURE — 80053 COMPREHEN METABOLIC PANEL: CPT | Performed by: STUDENT IN AN ORGANIZED HEALTH CARE EDUCATION/TRAINING PROGRAM

## 2025-02-17 PROCEDURE — 85651 RBC SED RATE NONAUTOMATED: CPT | Performed by: STUDENT IN AN ORGANIZED HEALTH CARE EDUCATION/TRAINING PROGRAM

## 2025-02-17 PROCEDURE — 36415 COLL VENOUS BLD VENIPUNCTURE: CPT | Performed by: STUDENT IN AN ORGANIZED HEALTH CARE EDUCATION/TRAINING PROGRAM

## 2025-02-18 LAB
ALBUMIN SERPL BCP-MCNC: 3.5 G/DL (ref 3.5–5.2)
ALP SERPL-CCNC: 56 U/L (ref 40–150)
ALT SERPL W/O P-5'-P-CCNC: 20 U/L (ref 10–44)
ANION GAP SERPL CALC-SCNC: 10 MMOL/L (ref 8–16)
AST SERPL-CCNC: 30 U/L (ref 10–40)
BILIRUB SERPL-MCNC: 0.4 MG/DL (ref 0.1–1)
BUN SERPL-MCNC: 12 MG/DL (ref 8–23)
CALCIUM SERPL-MCNC: 9.3 MG/DL (ref 8.7–10.5)
CHLORIDE SERPL-SCNC: 107 MMOL/L (ref 95–110)
CO2 SERPL-SCNC: 25 MMOL/L (ref 23–29)
CREAT SERPL-MCNC: 0.9 MG/DL (ref 0.5–1.4)
CRP SERPL-MCNC: 4.1 MG/L (ref 0–8.2)
EST. GFR  (NO RACE VARIABLE): >60 ML/MIN/1.73 M^2
GLUCOSE SERPL-MCNC: 96 MG/DL (ref 70–110)
POTASSIUM SERPL-SCNC: 3.6 MMOL/L (ref 3.5–5.1)
PROT SERPL-MCNC: 7.4 G/DL (ref 6–8.4)
SODIUM SERPL-SCNC: 142 MMOL/L (ref 136–145)

## 2025-02-24 DIAGNOSIS — Z00.00 ENCOUNTER FOR MEDICARE ANNUAL WELLNESS EXAM: ICD-10-CM

## 2025-03-13 ENCOUNTER — PATIENT MESSAGE (OUTPATIENT)
Dept: RHEUMATOLOGY | Facility: CLINIC | Age: 70
End: 2025-03-13
Payer: MEDICARE

## 2025-04-08 ENCOUNTER — OFFICE VISIT (OUTPATIENT)
Dept: INTERNAL MEDICINE | Facility: CLINIC | Age: 70
End: 2025-04-08
Payer: MEDICARE

## 2025-04-08 VITALS
OXYGEN SATURATION: 98 % | DIASTOLIC BLOOD PRESSURE: 60 MMHG | SYSTOLIC BLOOD PRESSURE: 132 MMHG | WEIGHT: 173.94 LBS | HEART RATE: 86 BPM | BODY MASS INDEX: 30.82 KG/M2 | HEIGHT: 63 IN

## 2025-04-08 DIAGNOSIS — M85.80 OSTEOPENIA, UNSPECIFIED LOCATION: ICD-10-CM

## 2025-04-08 DIAGNOSIS — K51.30 ULCERATIVE RECTOSIGMOIDITIS WITHOUT COMPLICATION: Chronic | ICD-10-CM

## 2025-04-08 DIAGNOSIS — M35.00 SICCA SYNDROME: ICD-10-CM

## 2025-04-08 DIAGNOSIS — E78.5 HYPERLIPIDEMIA, UNSPECIFIED HYPERLIPIDEMIA TYPE: Chronic | ICD-10-CM

## 2025-04-08 DIAGNOSIS — G47.33 OSA (OBSTRUCTIVE SLEEP APNEA): ICD-10-CM

## 2025-04-08 DIAGNOSIS — M35.9 UNDIFFERENTIATED CONNECTIVE TISSUE DISEASE: ICD-10-CM

## 2025-04-08 DIAGNOSIS — Z00.00 ENCOUNTER FOR MEDICARE ANNUAL WELLNESS EXAM: Primary | ICD-10-CM

## 2025-04-08 DIAGNOSIS — I10 ESSENTIAL HYPERTENSION: Chronic | ICD-10-CM

## 2025-04-08 PROCEDURE — 99214 OFFICE O/P EST MOD 30 MIN: CPT | Mod: PBBFAC | Performed by: NURSE PRACTITIONER

## 2025-04-08 PROCEDURE — 99999 PR PBB SHADOW E&M-EST. PATIENT-LVL IV: CPT | Mod: PBBFAC,,, | Performed by: NURSE PRACTITIONER

## 2025-04-08 NOTE — PATIENT INSTRUCTIONS
Counseling and Referral of Other Preventative  (Italic type indicates deductible and co-insurance are waived)    Patient Name: Juanita Walton  Today's Date: 4/8/2025    Health Maintenance       Date Due Completion Date    Shingles Vaccine (1 of 2) Never done ---    RSV Vaccine (Age 60+ and Pregnant patients) (1 - Risk 60-74 years 1-dose series) Never done ---    Mammogram 12/23/2025 12/23/2024    Hemoglobin A1c (Diabetic Prevention Screening) 08/02/2026 8/2/2023    DEXA Scan 09/07/2026 9/7/2023    Colorectal Cancer Screening 12/05/2026 12/5/2023    Override on 3/4/2016: Done    TETANUS VACCINE 12/14/2027 12/14/2017    Lipid Panel 11/07/2028 11/7/2023        No orders of the defined types were placed in this encounter.    The following information is provided to all patients.  This information is to help you find resources for any of the problems found today that may be affecting your health:                  Living healthy guide: www.[facility].[place].gov      Understanding Diabetes: www.diabetes.org      Eating healthy: www.cdc.gov/healthyweight      CDC home safety checklist: www.cdc.gov/steadi/patient.html      Agency on Aging: www.goea.[place].gov      Alcoholics anonymous (AA): www.aa.org      Physical Activity: www.[place].nih.gov/yv6ophe      Tobacco use: www.quitwithusla.org

## 2025-04-08 NOTE — PROGRESS NOTES
"  Juanita Walton presented for a  Medicare AWV and comprehensive Health Risk Assessment today. The following components were reviewed and updated:    Medical history  Family History  Social history  Allergies and Current Medications  Health Risk Assessment  Health Maintenance  Care Team         ** See Completed Assessments for Annual Wellness Visit within the encounter summary.**         The following assessments were completed:  Living Situation  CAGE  Depression Screening  Timed Get Up and Go  Whisper Test  Cognitive Function Screening  Nutrition Screening  ADL Screening  PAQ Screening      Opioid documentation:      Patient does not have a current opioid prescription.        Vitals:    04/08/25 1314   BP: 132/60   Pulse: 86   SpO2: 98%   Weight: 78.9 kg (173 lb 15.1 oz)   Height: 5' 2.6" (1.59 m)     Body mass index is 31.21 kg/m².  Physical Exam  Vitals and nursing note reviewed.   Constitutional:       Appearance: She is well-developed.   HENT:      Head: Normocephalic.   Cardiovascular:      Rate and Rhythm: Normal rate and regular rhythm.      Heart sounds: Normal heart sounds.   Pulmonary:      Effort: Pulmonary effort is normal. No respiratory distress.      Breath sounds: Normal breath sounds.   Abdominal:      Palpations: Abdomen is soft. There is no mass.      Tenderness: There is no abdominal tenderness.   Musculoskeletal:         General: Normal range of motion.   Skin:     General: Skin is warm and dry.   Neurological:      Mental Status: She is alert and oriented to person, place, and time.      Motor: No abnormal muscle tone.   Psychiatric:         Speech: Speech normal.         Behavior: Behavior normal.               Diagnoses and health risks identified today and associated recommendations/orders:    1. Encounter for Medicare annual wellness exam  - Referral to Enhanced Annual Wellness Visit (eAWV) W+1  Discussed receiving rsv and shingrix vaccine at pharmacy.     Abnormal cognitive function " screening.  Advised to follow up with PCP for further evaluation and recommendations. Patient expressed understanding.    Reports stress but is not problematic at this time. Patient knows to follow up with PCP if becomes problematic.    Has urine leakage ever interrupted your daily activites or sleep? No  Do you think you could use some help to better manage urine leakage?No     2. Undifferentiated connective tissue disease  Plaquenil  Stable per pt  Continue current treatment plan as previously prescribed with your  rheumatologist.     3. Sicca syndrome  Salagen  Continue current treatment plan as previously prescribed with your  rheumatologist.     4. Ulcerative rectosigmoiditis without complication  Entyvio  Continue current treatment plan as previously prescribed with your  gastroenterologist.     5. Essential hypertension  Stable. Continue current treatment plan as previously prescribed with your  pcp     6. Hyperlipidemia, unspecified hyperlipidemia type  No recent check  Advised to follow up with PCP for further evaluation and recommendations. Patient expressed understanding.      7. Osteopenia, unspecified location  Dexa 9/21  Continue current treatment plan as previously prescribed with your  pcp     8. MOHAMUD (obstructive sleep apnea)  CPAP  Continue current treatment plan as previously prescribed with your  sleep med provider    Encouraged healthy diet and exercise as tolerated to help bring BMI into normal range.        Provided Juanita with a 5-10 year written screening schedule and personal prevention plan. Recommendations were developed using the USPSTF age appropriate recommendations. Education, counseling, and referrals were provided as needed. After Visit Summary , available on Qonf, which includes a list of additional screenings\tests needed.    Follow up in about 1 year (around 4/8/2026) for awv.    Hui Butler NP  I offered to discuss advanced care planning, including how to pick a person  who would make decisions for you if you were unable to make them for yourself, called a health care power of , and what kind of decisions you might make such as use of life sustaining treatments such as ventilators and tube feeding when faced with a life limiting illness recorded on a living will that they will need to know. (How you want to be cared for as you near the end of your natural life)     X Patient is interested in learning more about how to make advanced directives.  I provided them paperwork and offered to discuss this with them.

## 2025-04-25 ENCOUNTER — TELEPHONE (OUTPATIENT)
Dept: RHEUMATOLOGY | Facility: CLINIC | Age: 70
End: 2025-04-25
Payer: MEDICARE

## 2025-04-25 DIAGNOSIS — M35.9 UNDIFFERENTIATED CONNECTIVE TISSUE DISEASE: Primary | ICD-10-CM

## 2025-04-25 NOTE — TELEPHONE ENCOUNTER
----- Message from Eileen sent at 4/25/2025  9:31 AM CDT -----  Contact: Juanita  Type:  Needs Medical AdviceWho Called: Leilani is looking for a recommendation of a new rheumatologist doctorWould the patient rather a call back or a response via [facility]? Call [facility] Call Back Number: [phone]!

## 2025-04-25 NOTE — TELEPHONE ENCOUNTER
1st attempt to contact pt to see where she wanted her rheum referral to be sent. Unable to LVM due to VM not bieng set up-DD

## 2025-04-25 NOTE — TELEPHONE ENCOUNTER
----- Message from Kaitlyn sent at 4/25/2025 11:43 AM CDT -----  Contact: REGGIE VALDES [[id number]]  .Type:  Patient Returning CallWho Called: REGGIE VALDES [[id number]]Who Left Message for Patient: Giulia Mcqueen MADoes the patient know what this is regarding?: rheum referralWould the patient rather a call back or a response via [facility]?  [facility] Call Back Number: [phone] .Additional Information:

## 2025-07-02 ENCOUNTER — PATIENT MESSAGE (OUTPATIENT)
Dept: ADMINISTRATIVE | Facility: OTHER | Age: 70
End: 2025-07-02
Payer: MEDICARE

## 2025-07-08 DIAGNOSIS — M35.00 SICCA SYNDROME: ICD-10-CM

## 2025-07-08 DIAGNOSIS — M35.9 UNDIFFERENTIATED CONNECTIVE TISSUE DISEASE: ICD-10-CM

## 2025-07-08 RX ORDER — PILOCARPINE HYDROCHLORIDE 5 MG/1
5 TABLET, FILM COATED ORAL 2 TIMES DAILY
Qty: 180 TABLET | Refills: 0 | Status: SHIPPED | OUTPATIENT
Start: 2025-07-08

## 2025-07-08 NOTE — TELEPHONE ENCOUNTER
Copied from CRM #[id number]. Topic: Medications - Medication Refill  >> Jul 8, 2025  9:16 AM Ryann wrote:  Type:  RX Refill Request    Who Called: Juanita  Refill or New Rx:Refill  RX Name and Strength:pilocarpine (SALAGEN) 5 MG Tab  How is the patient currently taking it? (ex. 1XDay): 2xDay  Is this a 30 day or 90 day RX:30 Day  Preferred Pharmacy with phone number:   [id number] - [place] - [address] [facility]  [address] [address]  Phone: [phone] Fax: [phone]  Local or Mail Order:local  Ordering Provider: Boogie Goldsmith  Would the patient rather a call back or a response via [facility]? Call back   Best Call Back Number:please call back at [phone]  Additional Information:

## 2025-07-08 NOTE — TELEPHONE ENCOUNTER
No care due was identified.  [facility] Embedded Care Due Messages. Reference number: [id number].   7/08/2025 2:53:21 PM CDT

## 2025-07-21 ENCOUNTER — PATIENT MESSAGE (OUTPATIENT)
Dept: ADMINISTRATIVE | Facility: HOSPITAL | Age: 70
End: 2025-07-21
Payer: MEDICARE

## 2025-07-22 ENCOUNTER — PATIENT OUTREACH (OUTPATIENT)
Dept: ADMINISTRATIVE | Facility: HOSPITAL | Age: 70
End: 2025-07-22
Payer: MEDICARE

## 2025-07-22 DIAGNOSIS — Z78.0 POST-MENOPAUSAL: Primary | ICD-10-CM

## 2025-07-22 NOTE — PROGRESS NOTES
Replying to Campaign Questionnaire for Overdue HM: Dexa    Order placed per [facility].   Scheduled appointment   Portal message sent to patient.

## 2025-07-23 LAB
CHOLEST SERPL-MCNC: 116 MG/DL
CHOLEST SERPL-MCNC: 169 MG/DL (ref 100–200)
HDL/CHOLESTEROL RATIO: 3.1 %
HDLC SERPL-MCNC: 54 MG/DL
LDLC SERPL CALC-MCNC: 92 MG/DL
VLDLC SERPL-MCNC: 23 MG/DL (ref 5–40)

## 2025-08-25 ENCOUNTER — PATIENT MESSAGE (OUTPATIENT)
Dept: ADMINISTRATIVE | Facility: OTHER | Age: 70
End: 2025-08-25
Payer: MEDICARE

## 2025-08-25 ENCOUNTER — PATIENT OUTREACH (OUTPATIENT)
Dept: ADMINISTRATIVE | Facility: HOSPITAL | Age: 70
End: 2025-08-25
Payer: MEDICARE